# Patient Record
Sex: FEMALE | Race: NATIVE HAWAIIAN OR OTHER PACIFIC ISLANDER | NOT HISPANIC OR LATINO | Employment: FULL TIME | URBAN - METROPOLITAN AREA
[De-identification: names, ages, dates, MRNs, and addresses within clinical notes are randomized per-mention and may not be internally consistent; named-entity substitution may affect disease eponyms.]

---

## 2017-01-16 ENCOUNTER — ALLSCRIPTS OFFICE VISIT (OUTPATIENT)
Dept: OTHER | Facility: OTHER | Age: 47
End: 2017-01-16

## 2017-01-16 DIAGNOSIS — N93.9 ABNORMAL UTERINE AND VAGINAL BLEEDING, UNSPECIFIED: ICD-10-CM

## 2017-01-16 DIAGNOSIS — E03.9 HYPOTHYROIDISM: ICD-10-CM

## 2017-01-16 DIAGNOSIS — I10 ESSENTIAL (PRIMARY) HYPERTENSION: ICD-10-CM

## 2017-01-18 ENCOUNTER — LAB CONVERSION - ENCOUNTER (OUTPATIENT)
Dept: OTHER | Facility: OTHER | Age: 47
End: 2017-01-18

## 2017-01-18 LAB
A/G RATIO (HISTORICAL): 1.5 (CALC) (ref 1–2.5)
ALBUMIN SERPL BCP-MCNC: 4 G/DL (ref 3.6–5.1)
ALP SERPL-CCNC: 30 U/L (ref 33–115)
ALT SERPL W P-5'-P-CCNC: 16 U/L (ref 6–29)
AST SERPL W P-5'-P-CCNC: 17 U/L (ref 10–35)
BASOPHILS # BLD AUTO: 0.7 %
BASOPHILS # BLD AUTO: 40 CELLS/UL (ref 0–200)
BILIRUB SERPL-MCNC: 0.6 MG/DL (ref 0.2–1.2)
BUN SERPL-MCNC: 12 MG/DL (ref 7–25)
BUN/CREA RATIO (HISTORICAL): ABNORMAL (CALC) (ref 6–22)
CALCIUM SERPL-MCNC: 9 MG/DL (ref 8.6–10.2)
CHLORIDE SERPL-SCNC: 103 MMOL/L (ref 98–110)
CO2 SERPL-SCNC: 25 MMOL/L (ref 20–31)
CREAT SERPL-MCNC: 0.71 MG/DL (ref 0.5–1.1)
DEPRECATED RDW RBC AUTO: 13.1 % (ref 11–15)
EGFR AFRICAN AMERICAN (HISTORICAL): 119 ML/MIN/1.73M2
EGFR-AMERICAN CALC (HISTORICAL): 103 ML/MIN/1.73M2
EOSINOPHIL # BLD AUTO: 1.9 %
EOSINOPHIL # BLD AUTO: 108 CELLS/UL (ref 15–500)
GAMMA GLOBULIN (HISTORICAL): 2.7 G/DL (CALC) (ref 1.9–3.7)
GLUCOSE (HISTORICAL): 91 MG/DL (ref 65–99)
HCT VFR BLD AUTO: 39.5 % (ref 35–45)
HGB BLD-MCNC: 13.3 G/DL (ref 11.7–15.5)
LYMPHOCYTES # BLD AUTO: 2269 CELLS/UL (ref 850–3900)
LYMPHOCYTES # BLD AUTO: 39.8 %
MCH RBC QN AUTO: 30.8 PG (ref 27–33)
MCHC RBC AUTO-ENTMCNC: 33.6 G/DL (ref 32–36)
MCV RBC AUTO: 91.8 FL (ref 80–100)
MONOCYTES # BLD AUTO: 325 CELLS/UL (ref 200–950)
MONOCYTES (HISTORICAL): 5.7 %
NEUTROPHILS # BLD AUTO: 2958 CELLS/UL (ref 1500–7800)
NEUTROPHILS # BLD AUTO: 51.9 %
PLATELET # BLD AUTO: 200 THOUSAND/UL (ref 140–400)
PMV BLD AUTO: 9.6 FL (ref 7.5–11.5)
POTASSIUM SERPL-SCNC: 4.5 MMOL/L (ref 3.5–5.3)
RBC # BLD AUTO: 4.3 MILLION/UL (ref 3.8–5.1)
SODIUM SERPL-SCNC: 138 MMOL/L (ref 135–146)
TOTAL PROTEIN (HISTORICAL): 6.7 G/DL (ref 6.1–8.1)
TSH SERPL DL<=0.05 MIU/L-ACNC: 3.58 MIU/L
WBC # BLD AUTO: 5.7 THOUSAND/UL (ref 3.8–10.8)

## 2017-01-31 ENCOUNTER — GENERIC CONVERSION - ENCOUNTER (OUTPATIENT)
Dept: OTHER | Facility: OTHER | Age: 47
End: 2017-01-31

## 2017-06-12 ENCOUNTER — ALLSCRIPTS OFFICE VISIT (OUTPATIENT)
Dept: OTHER | Facility: OTHER | Age: 47
End: 2017-06-12

## 2017-07-05 ENCOUNTER — ALLSCRIPTS OFFICE VISIT (OUTPATIENT)
Dept: OTHER | Facility: OTHER | Age: 47
End: 2017-07-05

## 2017-07-05 DIAGNOSIS — Z01.419 ENCOUNTER FOR GYNECOLOGICAL EXAMINATION WITHOUT ABNORMAL FINDING: ICD-10-CM

## 2017-07-05 DIAGNOSIS — N93.9 ABNORMAL UTERINE AND VAGINAL BLEEDING, UNSPECIFIED: ICD-10-CM

## 2017-07-05 PROCEDURE — G0145 SCR C/V CYTO,THINLAYER,RESCR: HCPCS | Performed by: FAMILY MEDICINE

## 2017-07-06 ENCOUNTER — LAB REQUISITION (OUTPATIENT)
Dept: LAB | Facility: HOSPITAL | Age: 47
End: 2017-07-06
Payer: COMMERCIAL

## 2017-07-06 DIAGNOSIS — Z01.419 ENCOUNTER FOR GYNECOLOGICAL EXAMINATION WITHOUT ABNORMAL FINDING: ICD-10-CM

## 2017-07-06 DIAGNOSIS — N93.9 ABNORMAL UTERINE AND VAGINAL BLEEDING, UNSPECIFIED: ICD-10-CM

## 2017-07-11 ENCOUNTER — HOSPITAL ENCOUNTER (OUTPATIENT)
Dept: ULTRASOUND IMAGING | Facility: CLINIC | Age: 47
Discharge: HOME | End: 2017-07-11
Payer: COMMERCIAL

## 2017-07-11 DIAGNOSIS — N93.9 ABNORMAL UTERINE AND VAGINAL BLEEDING, UNSPECIFIED: ICD-10-CM

## 2017-07-11 PROCEDURE — 76830 TRANSVAGINAL US NON-OB: CPT

## 2017-07-11 PROCEDURE — 76856 US EXAM PELVIC COMPLETE: CPT

## 2017-07-17 LAB
LAB AP GYN PRIMARY INTERPRETATION: NORMAL
LAB AP LMP: NORMAL
Lab: NORMAL

## 2018-01-13 VITALS
DIASTOLIC BLOOD PRESSURE: 80 MMHG | BODY MASS INDEX: 35.52 KG/M2 | HEIGHT: 66 IN | SYSTOLIC BLOOD PRESSURE: 130 MMHG | OXYGEN SATURATION: 99 % | HEART RATE: 88 BPM | WEIGHT: 221 LBS

## 2018-01-14 VITALS
SYSTOLIC BLOOD PRESSURE: 140 MMHG | BODY MASS INDEX: 33.75 KG/M2 | DIASTOLIC BLOOD PRESSURE: 88 MMHG | HEART RATE: 85 BPM | HEIGHT: 66 IN | WEIGHT: 210 LBS | OXYGEN SATURATION: 98 %

## 2018-01-15 NOTE — PROGRESS NOTES
Assessment    1  Encounter for preventive health examination (V70 0) (Z00 00)   2  Abnormal uterine bleeding (626 9) (N93 9)   3  Benign essential hypertension (401 1) (I10)   4  Hypothyroidism (244 9) (E03 9)    Plan  Abnormal uterine bleeding    · (1) CBC/PLT/DIFF; Status:Active; Requested [id number];   Benign essential hypertension, Hypothyroidism    · HydroCHLOROthiazide 25 MG Oral Tablet; take 1 tablet by mouth every day   · Lo Loestrin Fe 1 MG-10 MCG / 10 MCG Oral Tablet; TAKE 1 TABLET BY MOUTH  EVERY DAY AS DIRECTED   · (1) COMPREHENSIVE METABOLIC PANEL; Status:Active; Requested [id number]; Birth control    · Loryna 3-0 02 MG Oral Tablet  Hypothyroidism    · (1) TSH; Status:Active; Requested [id number];     Discussion/Summary  health maintenance visit Currently, she eats an adequate diet  cervical cancer screening is current Breast cancer screening: mammogram is current  Colorectal cancer screening: colorectal cancer screening is not indicated  Screening lab work includes hemoglobin and thyroid function testing  Advice and education were given regarding nutrition, aerobic exercise, weight loss, contraception and cardiovascular risk reduction  Patient discussion: discussed with the patient  Pt is to start HCTZ and change birth control pills  She is to follow up in 2 weeks for nursing appt  She is to have labs to recheck thyroid  The patient has the current Goals: Lose weight and decrease blood pressure  The patent has the current Barriers: Insurance costs  Patient is able to Self-Care  Educational resources provided:   Possible side effects of new medications were reviewed with the patient/guardian today  The treatment plan was reviewed with the patient/guardian   The patient/guardian understands and agrees with the treatment plan   The patient was counseled regarding instructions for management, risk factor reductions, patient and family education, impressions, risks and benefits of treatment options, importance of compliance with treatment  Self Referrals: No      Chief Complaint  Pt  in office today for a check up  She would like to discuss her blood pressure and her menstrual cycle  History of Present Illness  HM, Adult Female: The patient is being seen for a health maintenance evaluation  The last health maintenance visit was 1 year(s) ago  Social History: Household members include spouse and 1 son(s)  She is   Work status: working full time  The patient has never smoked cigarettes  She reports occasional alcohol use  She has never used illicit drugs  General Health: The patient's health since the last visit is described as good  She has regular dental visits  She complains of vision problems  Vision care includes wearing glasses and having regular eye examinations  She denies hearing loss  Lifestyle:  She consumes a diverse and healthy diet  She has weight concerns  She exercises regularly  She does not use tobacco  She denies alcohol use  She denies drug use  Reproductive health: the patient is perimenopausal   she reports abnormal menses  she uses contraception  For contraception, she uses oral contraception pills  she is sexually active  pregnancy history: G 1P 1, 0, 0, 1  Screening:   HPI: Ptis here for check up  She has had some abnormal periods  She is on birth control which should regulate menses  Dysfunctional Uterine Bleeding (Brief): The patient is being seen for an initial evaluation of an existing diagnosis of dysfunctional uterine bleeding  Symptoms:  irregular vaginal bleeding, prolonged menstrual bleeding, hypomenorrhea and breakthrough bleeding, but no postcoital bleeding and no polymenorrhea  The patient is currently experiencing symptoms  Associated symptoms:  bloating, but no weight loss, no weight gain, no fatigue, no fever, no pelvic pain, no easy bruising, no nipple discharge, no lightheadedness and no shortness of breath     Hypothyroidism (Follow-Up): The patient is being seen for follow-up of hypothyroidism of undetermined etiology  The patient reports doing well  The patient is currently asymptomatic  Hypertension (Follow-Up): The patient presents for follow-up of essential hypertension  The patient states she has been stable with her blood pressure control since the last visit  She has no comorbid illnesses  She has no significant interval events  Symptoms: The patient is currently asymptomatic  Review of Systems    Constitutional: No fever, no chills, feels well, no tiredness, no recent weight gain or weight loss  Eyes: No complaints of eye pain, no red eyes, no eyesight problems, no discharge, no dry eyes, no itching of eyes  ENT: no complaints of earache, no loss of hearing, no nose bleeds, no nasal discharge, no sore throat, no hoarseness  Cardiovascular: No complaints of slow heart rate, no fast heart rate, no chest pain, no palpitations, no leg claudication, no lower extremity edema  Respiratory: No complaints of shortness of breath, no wheezing, no cough, no SOB on exertion, no orthopnea, no PND  Gastrointestinal: No complaints of abdominal pain, no constipation, no nausea or vomiting, no diarrhea, no bloody stools  Genitourinary: as noted in HPI  Musculoskeletal: No complaints of arthralgias, no myalgias, no joint swelling or stiffness, no limb pain or swelling  Integumentary: No complaints of skin rash or lesions, no itching, no skin wounds, no breast pain or lump  Neurological: No complaints of headache, no confusion, no convulsions, no numbness, no dizziness or fainting, no tingling, no limb weakness, no difficulty walking  Psychiatric: Not suicidal, no sleep disturbance, no anxiety or depression, no change in personality, no emotional problems  Endocrine: No complaints of proptosis, no hot flashes, no muscle weakness, no deepening of the voice, no feelings of weakness     Hematologic/Lymphatic: No complaints of swollen glands, no swollen glands in the neck, does not bleed easily, does not bruise easily  ROS reviewed  Active Problems    1  Abnormal mammogram (793 80) (R92 8)   2  Abnormal uterine bleeding (626 9) (N93 9)   3  Benign essential hypertension (401 1) (I10)   4  Birth control (V25 9) (Z30 9)   5  Encounter for gynecological examination with Papanicolaou smear of cervix   (V72 31,V76 2) (Z01 419,Z12 4)   6  Hypertriglyceridemia without hypercholesterolemia (272 1) (E78 1)   7  Hypothyroidism (244 9) (E03 9)   8  Need for diphtheria-tetanus-pertussis (Tdap) vaccine (V06 1) (Z23)   9  Need for influenza vaccination (V04 81) (Z23)    Surgical History    · History of  Section Low Transverse   · History of Cholecystectomy Laparoscopic   · History of Oral Surgery Tooth Extraction    Family History  Mother    · Denied: Family history of mental disorder   · Denied: Family history of substance abuse  Father    · Denied: Family history of mental disorder   · Denied: Family history of substance abuse    Social History    · Always uses seat belt   · Employed   · Exercises regularly   ·    · Never a smoker   · Primary spoken language English   · Seeing a dentist    Current Meds   1  [facility] Natural Fish Oil 1000 MG Oral Capsule; 2 po once daily; Therapy: 2016 to (Last Rx:2016) Ordered   2  Levothyroxine Sodium 137 MCG Oral Tablet; Take 1 tablet daily; Therapy: [id number] to (Evaluate:2017)  Requested for: 73Fia2916; Last   Rx:[id number] Ordered   3  Loryna 3-0 02 MG Oral Tablet; Take 1 tablet daily; Therapy: 40VRJ5886 to (Evaluate:40Uuj0529)  Requested for: 22SEL7180; Last   Rx:2016 Ordered    Allergies    1   No Known Drug Allergies    Vitals   Recorded: 65AGJ9049 02:12PM   Heart Rate 81   Systolic 440   Diastolic 90   Height 5 ft 6 in   Weight 220 lb    BMI Calculated 35 51   BSA Calculated 2 09   O2 Saturation 98     Physical Exam    Constitutional   General appearance: No acute distress, well appearing and well nourished  Eyes   Conjunctiva and lids: No swelling, erythema or discharge  Pupils and irises: Equal, round and reactive to light  Ears, Nose, Mouth, and Throat   External inspection of ears and nose: Normal     Otoscopic examination: Tympanic membranes translucent with normal light reflex  Canals patent without erythema  Oropharynx: Normal with no erythema, edema, exudate or lesions  Pulmonary   Respiratory effort: No increased work of breathing or signs of respiratory distress  Auscultation of lungs: Clear to auscultation  Cardiovascular   Palpation of heart: Normal PMI, no thrills  Auscultation of heart: Normal rate and rhythm, normal S1 and S2, without murmurs  Examination of extremities for edema and/or varicosities: Normal     Abdomen   Abdomen: Non-tender, no masses  Liver and spleen: No hepatomegaly or splenomegaly  Lymphatic   Palpation of lymph nodes in neck: No lymphadenopathy  Musculoskeletal   Gait and station: Normal     Digits and nails: Normal without clubbing or cyanosis  Inspection/palpation of joints, bones, and muscles: Normal     Skin   Skin and subcutaneous tissue: Normal without rashes or lesions  Neurologic   Cranial nerves: Cranial nerves 2-12 intact  Reflexes: 2+ and symmetric  Sensation: No sensory loss      Psychiatric   Orientation to person, place, and time: Normal     Mood and affect: Normal        Signatures   Electronically signed by : SAVANNAH Patel; Jan 16 2017  2:54PM EST                       (Author)    Electronically signed by : KEELY Morfin ; Jan 16 2017  3:26PM EST

## 2018-01-22 VITALS
SYSTOLIC BLOOD PRESSURE: 152 MMHG | HEART RATE: 81 BPM | HEIGHT: 66 IN | DIASTOLIC BLOOD PRESSURE: 90 MMHG | OXYGEN SATURATION: 98 % | WEIGHT: 220 LBS | BODY MASS INDEX: 35.36 KG/M2

## 2018-01-22 VITALS — DIASTOLIC BLOOD PRESSURE: 90 MMHG | TEMPERATURE: 98.8 F | HEART RATE: 90 BPM | SYSTOLIC BLOOD PRESSURE: 140 MMHG

## 2018-02-15 ENCOUNTER — TELEPHONE (OUTPATIENT)
Dept: FAMILY MEDICINE CLINIC | Facility: CLINIC | Age: 48
End: 2018-02-15

## 2018-02-15 DIAGNOSIS — E03.9 HYPOTHYROIDISM, UNSPECIFIED TYPE: Primary | ICD-10-CM

## 2018-02-15 RX ORDER — LEVOTHYROXINE SODIUM 137 UG/1
137 TABLET ORAL DAILY
Qty: 90 TABLET | Refills: 1 | Status: SHIPPED | OUTPATIENT
Start: 2018-02-15 | End: 2018-04-27 | Stop reason: SDUPTHER

## 2018-02-15 RX ORDER — LEVOTHYROXINE SODIUM 137 UG/1
1 TABLET ORAL DAILY
COMMUNITY
Start: 2015-05-29 | End: 2018-02-15 | Stop reason: SDUPTHER

## 2018-03-17 ENCOUNTER — HOSPITAL ENCOUNTER (EMERGENCY)
Facility: HOSPITAL | Age: 48
Discharge: HOME | End: 2018-03-17
Attending: EMERGENCY MEDICINE
Payer: COMMERCIAL

## 2018-03-17 ENCOUNTER — APPOINTMENT (EMERGENCY)
Dept: CT IMAGING | Facility: HOSPITAL | Age: 48
End: 2018-03-17
Payer: COMMERCIAL

## 2018-03-17 VITALS
HEART RATE: 93 BPM | DIASTOLIC BLOOD PRESSURE: 103 MMHG | WEIGHT: 208 LBS | SYSTOLIC BLOOD PRESSURE: 172 MMHG | TEMPERATURE: 98.5 F | HEIGHT: 66 IN | OXYGEN SATURATION: 98 % | RESPIRATION RATE: 18 BRPM | BODY MASS INDEX: 33.43 KG/M2

## 2018-03-17 DIAGNOSIS — S09.90XA MINOR HEAD INJURY: Primary | ICD-10-CM

## 2018-03-17 PROCEDURE — 90715 TDAP VACCINE 7 YRS/> IM: CPT | Performed by: EMERGENCY MEDICINE

## 2018-03-17 PROCEDURE — 70450 CT HEAD/BRAIN W/O DYE: CPT

## 2018-03-17 PROCEDURE — 72125 CT NECK SPINE W/O DYE: CPT

## 2018-03-17 PROCEDURE — 90471 IMMUNIZATION ADMIN: CPT

## 2018-03-17 PROCEDURE — 99284 EMERGENCY DEPT VISIT MOD MDM: CPT

## 2018-03-17 PROCEDURE — 70486 CT MAXILLOFACIAL W/O DYE: CPT

## 2018-03-17 RX ORDER — ACETAMINOPHEN 325 MG/1
975 TABLET ORAL ONCE
Status: COMPLETED | OUTPATIENT
Start: 2018-03-17 | End: 2018-03-17

## 2018-03-17 RX ORDER — BACITRACIN, NEOMYCIN, POLYMYXIN B 400; 3.5; 5 [USP'U]/G; MG/G; [USP'U]/G
1 OINTMENT TOPICAL ONCE
Status: COMPLETED | OUTPATIENT
Start: 2018-03-17 | End: 2018-03-17

## 2018-03-17 RX ORDER — NAPROXEN 250 MG/1
500 TABLET ORAL ONCE
Status: COMPLETED | OUTPATIENT
Start: 2018-03-17 | End: 2018-03-17

## 2018-03-17 RX ORDER — NAPROXEN 500 MG/1
500 TABLET ORAL 2 TIMES DAILY WITH MEALS
Qty: 20 TABLET | Refills: 0 | Status: SHIPPED | OUTPATIENT
Start: 2018-03-17 | End: 2018-03-21

## 2018-03-17 RX ADMIN — BACITRACIN, NEOMYCIN, POLYMYXIN B 1 SMALL APPLICATION: 400; 3.5; 5 OINTMENT TOPICAL at 16:37

## 2018-03-17 RX ADMIN — NAPROXEN 500 MG: 250 TABLET ORAL at 18:30

## 2018-03-17 RX ADMIN — ACETAMINOPHEN 975 MG: 325 TABLET, FILM COATED ORAL at 16:36

## 2018-03-17 RX ADMIN — TETANUS TOXOID, REDUCED DIPHTHERIA TOXOID AND ACELLULAR PERTUSSIS VACCINE, ADSORBED 0.5 ML: 5; 2.5; 8; 8; 2.5 SUSPENSION INTRAMUSCULAR at 17:48

## 2018-03-17 NOTE — DISCHARGE INSTRUCTIONS

## 2018-03-21 ENCOUNTER — OFFICE VISIT (OUTPATIENT)
Dept: FAMILY MEDICINE CLINIC | Facility: CLINIC | Age: 48
End: 2018-03-21
Payer: COMMERCIAL

## 2018-03-21 VITALS
WEIGHT: 214.2 LBS | HEIGHT: 66 IN | HEART RATE: 90 BPM | DIASTOLIC BLOOD PRESSURE: 76 MMHG | SYSTOLIC BLOOD PRESSURE: 140 MMHG | BODY MASS INDEX: 34.42 KG/M2 | OXYGEN SATURATION: 98 %

## 2018-03-21 DIAGNOSIS — G44.319 ACUTE POST-TRAUMATIC HEADACHE, NOT INTRACTABLE: Primary | ICD-10-CM

## 2018-03-21 DIAGNOSIS — S06.0X0A CONCUSSION WITHOUT LOSS OF CONSCIOUSNESS, INITIAL ENCOUNTER: ICD-10-CM

## 2018-03-21 DIAGNOSIS — J00 OTHER ACUTE RHINITIS: ICD-10-CM

## 2018-03-21 PROCEDURE — 99214 OFFICE O/P EST MOD 30 MIN: CPT | Performed by: FAMILY MEDICINE

## 2018-03-21 RX ORDER — TRAMADOL HYDROCHLORIDE 50 MG/1
50 TABLET ORAL EVERY 6 HOURS PRN
Qty: 30 TABLET | Refills: 0 | Status: ON HOLD | OUTPATIENT
Start: 2018-03-21 | End: 2019-04-22

## 2018-03-21 RX ORDER — AZELASTINE 1 MG/ML
1 SPRAY, METERED NASAL 2 TIMES DAILY
Qty: 30 ML | Refills: 0 | Status: SHIPPED | OUTPATIENT
Start: 2018-03-21 | End: 2018-04-17 | Stop reason: SDUPTHER
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
## 2018-03-21 NOTE — PATIENT INSTRUCTIONS
Concussion   AMBULATORY CARE:   A concussion  is a mild brain injury  It is usually caused by a bump or blow to the head from a fall, a motor vehicle crash, or a sports injury  Sometimes being forcefully shaken may cause a concussion  Common symptoms include the following:  Symptoms may occur right away, or they may appear days after the concussion  After the injury, you may have any of these symptoms:  · A mild to moderate headache    · Dizziness, loss of balance, or blurry vision    · Nausea or vomiting     · A change in mood, such as restlessness or irritability    · Trouble thinking, remembering things, or concentrating    · Ringing in the ears    · Drowsiness or decreased energy    · Changes in your normal sleeping pattern  Have someone else call 911 for the following:   · Someone tries to wake you and cannot do so  · You have a seizure, increasing confusion, or a change in personality  · Your speech becomes slurred, or you have new vision problems  Seek care immediately if:   · You have a severe headache that does not go away  · Someone tries to wake you and cannot do so  · You have a seizure, increasing confusion, or a change in personality  · Your speech becomes slurred, or you have new vision problems  · You have arm or leg weakness, numbness, or new problems with coordination  · You have blood or clear fluid coming out of the ears or nose  Contact your healthcare provider if:   · You have nausea or are vomiting  · You feel more sleepy than usual     · Your symptoms get worse  · Your symptoms last longer than 6 weeks after the injury  · You have questions or concerns about your condition or care  Manage a concussion:  Usually no treatment is needed for a mild concussion  Concussion symptoms usually go away within about 10 days  The following may be recommended to manage your symptoms:  · Rest  from physical and mental activities as directed   Mental activities are those that require thinking, concentration, and attention  You will need to rest until your symptoms are gone  Rest will allow you to recover from your concussion  Ask your healthcare provider when you can return to work and other daily activities  · Have someone stay with you for the first 24 hours after your injury  Your healthcare provider should be contacted if your symptoms get worse, or you develop new symptoms  · Do not participate in sports and physical activities until your healthcare provider says it is okay  They could make your symptoms worse or lead to another concussion  Your healthcare provider will tell you when it is okay for you to return to sports or physical activities  · Acetaminophen  helps to decrease pain  It is available without a doctor's order  Ask how much to take and how often to take it  Follow directions  Acetaminophen can cause liver damage if not taken correctly  · NSAIDs , such as ibuprofen, help decrease swelling and pain  NSAIDs can cause stomach bleeding or kidney problems in certain people  If you take blood thinner medicine, always ask your healthcare provider if NSAIDs are safe for you  Always read the medicine label and follow directions  Prevent another concussion:   · Wear protective sports equipment that fit properly  Helmets help decrease your risk of a serious brain injury  Talk to your healthcare provider about ways you can decrease your risk for a concussion if you play sports  · Wear your seat belt  every time you travel  This helps to decrease your risk of a head injury if you are in a car accident  Follow up with your healthcare provider as directed:  Write down your questions so you remember to ask them during your visits  © 2017 [employer] Information is for End User's use only and may not be sold, redistributed or otherwise used for commercial purposes   All illustrations and images included in CareNotes® are the copyrighted property of [employer]  or [employer] Ramírez  The above information is an  only  It is not intended as medical advice for individual conditions or treatments  Talk to your doctor, nurse or pharmacist before following any medical regimen to see if it is safe and effective for you
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
## 2018-04-17 DIAGNOSIS — J30.1 SEASONAL ALLERGIC RHINITIS DUE TO POLLEN: Primary | ICD-10-CM

## 2018-04-17 RX ORDER — AZELASTINE 1 MG/ML
SPRAY, METERED NASAL
Qty: 30 ML | Refills: 3 | Status: SHIPPED | OUTPATIENT
Start: 2018-04-17 | End: 2019-02-05 | Stop reason: SDUPTHER

## 2018-04-27 DIAGNOSIS — E03.9 HYPOTHYROIDISM, UNSPECIFIED TYPE: ICD-10-CM

## 2018-04-27 RX ORDER — LEVOTHYROXINE SODIUM 137 UG/1
TABLET ORAL
Qty: 90 TABLET | Refills: 1 | Status: SHIPPED | OUTPATIENT
Start: 2018-04-27 | End: 2018-08-01 | Stop reason: SDUPTHER

## 2018-06-06 ENCOUNTER — OFFICE VISIT (OUTPATIENT)
Dept: OBGYN CLINIC | Facility: MEDICAL CENTER | Age: 48
End: 2018-06-06
Payer: COMMERCIAL

## 2018-06-06 VITALS
DIASTOLIC BLOOD PRESSURE: 74 MMHG | WEIGHT: 211 LBS | BODY MASS INDEX: 33.91 KG/M2 | HEIGHT: 66 IN | RESPIRATION RATE: 14 BRPM | SYSTOLIC BLOOD PRESSURE: 126 MMHG

## 2018-06-06 DIAGNOSIS — N92.0 MENORRHAGIA WITH REGULAR CYCLE: Primary | ICD-10-CM

## 2018-06-06 PROCEDURE — 88305 TISSUE EXAM BY PATHOLOGIST: CPT | Performed by: PATHOLOGY

## 2018-06-06 PROCEDURE — 99214 OFFICE O/P EST MOD 30 MIN: CPT | Performed by: OBSTETRICS & GYNECOLOGY

## 2018-06-06 RX ORDER — TRANEXAMIC ACID 650 1/1
2 TABLET ORAL 3 TIMES DAILY
Qty: 30 TABLET | Refills: 3 | Status: SHIPPED | OUTPATIENT
Start: 2018-06-06 | End: 2018-06-11

## 2018-06-06 NOTE — PROGRESS NOTES
Assessment/Plan:      Diagnoses and all orders for this visit:    Menorrhagia with regular cycle  -     Tissue Exam        Placement of Mirena IUD in the near future,  Lysteda for heavy breakthrough bleeding  Stop oral contraceptives after Mirena in place  Do not take Lysteda with a combination oral contraceptive  Subjective:     Patient ID: Ivy Levi is a 56 y o  female  Patient is a 50-year-old female, para 1, with a large fibroid uterus, missing work because of heavy menses  Last year the patient had a normal Pap smear  For the last 3 years patient's menses is been getting heavier and heavier  Patient denies dysmenorrhea or pelvic pain  Patient's mother went through menopause in her early 46s  Past surgical history is significant for a  and a cholecystectomy  Current medications are Junel, iron,  levothyroxine and hydrochlorothiazide  No known drug allergies  Patient denies a family history of breast ovarian or colon cancer  Review of Systems   Genitourinary: Negative for pelvic pain  Objective:     Physical Exam   Constitutional: She is oriented to person, place, and time  She appears well-developed and well-nourished  HENT:   Head: Normocephalic  Eyes: Pupils are equal, round, and reactive to light  Neck: No thyromegaly present  Cardiovascular: Normal rate and regular rhythm  Pulmonary/Chest: Breath sounds normal  She is in respiratory distress  She has no wheezes  She has no rales  Abdominal: Soft  She exhibits mass  She exhibits no distension  There is no tenderness  There is no rebound and no guarding  Fibroid uterus palpable to umbilicus  Genitourinary: Vagina normal and uterus normal    Neurological: She is alert and oriented to person, place, and time  Skin: Skin is warm  Psychiatric: She has a normal mood and affect   Her behavior is normal

## 2018-06-06 NOTE — PROGRESS NOTES
Procedures Endometrial Biopsy Procedure Note    Pre-operative Diagnosis:  Menorrhagia    Post-operative Diagnosis: same    Indications: abnormal uterine bleeding    Procedure Details    Urine pregnancy test was done today and result was negative  The risks (including infection, bleeding, pain, and uterine perforation) and benefits of the procedure were explained to the patient and Written informed consent was obtained  The patient was placed in the dorsal lithotomy position  Bimanual exam showed the uterus to be in the anteroflexed position  A speculum inserted in the vagina, and the cervix prepped with povidone iodine  A Pipelle endometrial aspirator was used to sample the endometrium  A(n) moderate amount of tissue was obtained  Sample was sent for pathologic examination  Condition:  Stable    Complications:  None    Plan:    The patient was advised to call for any fever or for prolonged or severe pain or bleeding  She was advised to use NSAID as needed for mild to moderate pain  She was advised to avoid vaginal intercourse for 48 hours or until the bleeding has completely stopped  Attending Physician Documentation:  I was present for or participated in the entire procedure, including opening and closing

## 2018-06-19 ENCOUNTER — TELEPHONE (OUTPATIENT)
Dept: OBGYN CLINIC | Facility: MEDICAL CENTER | Age: 48
End: 2018-06-19

## 2018-06-19 NOTE — TELEPHONE ENCOUNTER
----- Message from Javad Burgess MD sent at 6/12/2018  3:31 PM EDT -----  Please notify patient her EBX was benign

## 2018-07-02 ENCOUNTER — TELEPHONE (OUTPATIENT)
Dept: OBGYN CLINIC | Facility: CLINIC | Age: 48
End: 2018-07-02

## 2018-07-02 NOTE — TELEPHONE ENCOUNTER
Spoke with pt  She would like Mirena inserted asap, states she has had period 3x this month and would like to get the mirena in place  She had her period last week, states it has been irregular and is unsure when she will have again  She reports being abstinent since December of 2017 and denies possibility of pregnancy  When would you advise pt be scheduled? Thank you!

## 2018-07-02 NOTE — TELEPHONE ENCOUNTER
[facility] from [facility]  Will place in [facility] room pending insertion  Can call pt to schedule apt  Thank you!

## 2018-07-03 ENCOUNTER — TELEPHONE (OUTPATIENT)
Dept: OBGYN CLINIC | Facility: CLINIC | Age: 48
End: 2018-07-03

## 2018-07-06 ENCOUNTER — OFFICE VISIT (OUTPATIENT)
Dept: OBGYN CLINIC | Facility: CLINIC | Age: 48
End: 2018-07-06
Payer: COMMERCIAL

## 2018-07-06 VITALS
SYSTOLIC BLOOD PRESSURE: 124 MMHG | WEIGHT: 210 LBS | HEIGHT: 67 IN | DIASTOLIC BLOOD PRESSURE: 70 MMHG | BODY MASS INDEX: 32.96 KG/M2 | RESPIRATION RATE: 14 BRPM

## 2018-07-06 DIAGNOSIS — Z30.430 ENCOUNTER FOR IUD INSERTION: Primary | ICD-10-CM

## 2018-07-06 PROCEDURE — 58300 INSERT INTRAUTERINE DEVICE: CPT | Performed by: OBSTETRICS & GYNECOLOGY

## 2018-07-06 PROCEDURE — 99213 OFFICE O/P EST LOW 20 MIN: CPT | Performed by: OBSTETRICS & GYNECOLOGY

## 2018-07-06 NOTE — PROGRESS NOTES
Procedures    IUD Insertion Procedure Note    Pre-operative Diagnosis: Menorrhagia    Post-operative Diagnosis: same    Indications: contraception    Procedure Details   Urine pregnancy test was done today and result was negative  Her LMP was 6/23/18   The risks (including infection, bleeding, pain, ectopic pregnancy and uterine perforation) and benefits of the procedure were explained to the patient and Written informed consent was obtained  Cervix cleansed with Betadine  Uterus sounded to 10 cm  IUD inserted without difficulty  Strings trimmed to 6 cm  Patient tolerated procedure well  IUD Information:  Mirena  Condition:  Stable    Complications:  None    Plan:    The patient was advised to call for any fever or for prolonged or severe pain or bleeding  She was advised to use NSAID as needed for mild to moderate pain  Attending Physician Documentation:  I was present for or participated in the entire procedure, including opening and closing

## 2018-07-11 ENCOUNTER — OFFICE VISIT (OUTPATIENT)
Dept: OBGYN CLINIC | Facility: MEDICAL CENTER | Age: 48
End: 2018-07-11

## 2018-07-11 VITALS
WEIGHT: 204.7 LBS | HEIGHT: 67 IN | DIASTOLIC BLOOD PRESSURE: 70 MMHG | SYSTOLIC BLOOD PRESSURE: 110 MMHG | BODY MASS INDEX: 32.13 KG/M2

## 2018-07-11 DIAGNOSIS — Z30.431 IUD CHECK UP: Primary | ICD-10-CM

## 2018-07-11 PROCEDURE — 99024 POSTOP FOLLOW-UP VISIT: CPT | Performed by: OBSTETRICS & GYNECOLOGY

## 2018-07-11 NOTE — PROGRESS NOTES
Assessment/Plan:      There are no diagnoses linked to this encounter  Subjective:     Patient ID: Bobbi Holder is a 56 y o  female  Patient is here for an IUD check  Review of systems is negative for fevers, heavy vaginal bleeding, pelvic or abdominal pain  Review of Systems   Constitutional: Negative for fever  Genitourinary: Negative for pelvic pain  Objective:     Physical Exam   Abdominal: Soft  She exhibits no distension  There is no tenderness  There is no rebound and no guarding     Genitourinary: Vagina normal and uterus normal    Genitourinary Comments: IUD strings found at 6m and shortened to 3cm

## 2018-08-01 ENCOUNTER — OFFICE VISIT (OUTPATIENT)
Dept: FAMILY MEDICINE CLINIC | Facility: CLINIC | Age: 48
End: 2018-08-01
Payer: COMMERCIAL

## 2018-08-01 VITALS
BODY MASS INDEX: 33.09 KG/M2 | HEIGHT: 67 IN | HEART RATE: 90 BPM | DIASTOLIC BLOOD PRESSURE: 88 MMHG | TEMPERATURE: 99.1 F | SYSTOLIC BLOOD PRESSURE: 134 MMHG | WEIGHT: 210.8 LBS | OXYGEN SATURATION: 97 %

## 2018-08-01 DIAGNOSIS — Z13.6 SCREENING FOR CARDIOVASCULAR CONDITION: ICD-10-CM

## 2018-08-01 DIAGNOSIS — E03.9 HYPOTHYROIDISM, ADULT: Primary | ICD-10-CM

## 2018-08-01 DIAGNOSIS — Z13.1 SCREENING FOR DIABETES MELLITUS (DM): ICD-10-CM

## 2018-08-01 DIAGNOSIS — E03.9 HYPOTHYROIDISM, UNSPECIFIED TYPE: ICD-10-CM

## 2018-08-01 PROCEDURE — 99396 PREV VISIT EST AGE 40-64: CPT | Performed by: PHYSICIAN ASSISTANT

## 2018-08-01 RX ORDER — LEVOTHYROXINE SODIUM 137 UG/1
137 TABLET ORAL DAILY
Qty: 90 TABLET | Refills: 1 | Status: SHIPPED | OUTPATIENT
Start: 2018-08-01 | End: 2018-10-25 | Stop reason: ALTCHOICE

## 2018-08-01 NOTE — PROGRESS NOTES
Assessment/Plan     Healthy female exam       1  Labs ordered  2  Patient Counseling:  --Nutrition: Stressed importance of moderation in sodium/caffeine intake, saturated fat and cholesterol, caloric balance, sufficient intake of fresh fruits, vegetables, fiber, calcium, iron, and 1 mg of folate supplement per day (for females capable of pregnancy)  --Discussed the issue of estrogen replacement, calcium supplement, and the daily use of baby aspirin  --Exercise: Stressed the importance of regular exercise  --Injury prevention: Discussed safety belts, safety helmets, smoke detector, smoking near bedding or upholstery  --Dental health: Discussed importance of regular tooth brushing, flossing, and dental visits  --Immunizations reviewed  --After hours service discussed with patient    3  Discussed the patient's BMI with her  The BMI is above average; BMI management plan is completed  4  Follow up in one year  Lani Sherwood is a 56 y o  female and is here for a comprehensive physical exam  The patient reports no problems  Do you take any herbs or supplements that were not prescribed by a doctor? yes  Are you taking calcium supplements? no  Are you taking aspirin daily? no     History:  Up to date    The following portions of the patient's history were reviewed and updated as appropriate:   She  has a past medical history of Disease of thyroid gland  She   Patient Active Problem List    Diagnosis Date Noted    Hypertriglyceridemia without hypercholesterolemia 2016    Benign essential hypertension 2016    Hypothyroidism 2014    Abnormal mammogram 2014     She  has a past surgical history that includes  section, low transverse; Cholecystectomy; and Tooth extraction  Her family history is not on file  She  reports that she has never smoked  She has never used smokeless tobacco  She reports that she does not drink alcohol or use drugs    Current Outpatient Prescriptions   Medication Sig Dispense Refill    hydrochlorothiazide (HYDRODIURIL) 25 mg tablet Take 1 tablet by mouth daily      levothyroxine 137 mcg tablet Take 1 tablet (137 mcg total) by mouth daily 90 tablet 1    MIRENA, 52 MG, 20 MCG/24HR IUD       Omega-3 Fatty Acids ([employer] NATURAL FISH OIL) 1000 MG CAPS Take by mouth      azelastine (ASTELIN) 0 1 % nasal spray INSTILL 1 SPRAY IN EACH NOSTRIL TWICE A DAY 30 mL 3    traMADol (ULTRAM) 50 mg tablet Take 1 tablet (50 mg total) by mouth every 6 (six) hours as needed for moderate pain 30 tablet 0     No current facility-administered medications for this visit  Current Outpatient Prescriptions on File Prior to Visit   Medication Sig    hydrochlorothiazide (HYDRODIURIL) 25 mg tablet Take 1 tablet by mouth daily    MIRENA, 52 MG, 20 MCG/24HR IUD     Omega-3 Fatty Acids ([employer] NATURAL FISH OIL) 1000 MG CAPS Take by mouth    [DISCONTINUED] levothyroxine 137 mcg tablet TAKE 1 TABLET BY MOUTH EVERY DAY    azelastine (ASTELIN) 0 1 % nasal spray INSTILL 1 SPRAY IN EACH NOSTRIL TWICE A DAY    traMADol (ULTRAM) 50 mg tablet Take 1 tablet (50 mg total) by mouth every 6 (six) hours as needed for moderate pain    [DISCONTINUED] norethindrone-ethinyl estradiol (LOESTRIN FE 1/20) 1-20 MG-MCG per tablet Take 1 tablet by mouth daily     No current facility-administered medications on file prior to visit  She has No Known Allergies       Review of Systems  Do you have pain that bothers you in your daily life? no  Pertinent items are noted in HPI       Objective     /88   Pulse 90   Temp 99 1 °F (37 3 °C)   Ht 5' 6 5" (1 689 m)   Wt 95 6 kg (210 lb 12 8 oz)   SpO2 97%   BMI 33 51 kg/m²   General appearance: alert and oriented, in no acute distress  Head: Normocephalic, without obvious abnormality, atraumatic  Eyes: negative  Ears: normal TM's and external ear canals both ears  Nose: Nares normal  Septum midline   Mucosa normal  No drainage or sinus tenderness  Throat: lips, mucosa, and tongue normal; teeth and gums normal  Neck: no adenopathy, no carotid bruit, supple, symmetrical, trachea midline and thyroid not enlarged, symmetric, no tenderness/mass/nodules  Back: symmetric, no curvature  ROM normal  No CVA tenderness    Lungs: clear to auscultation bilaterally  Heart: regular rate and rhythm, S1, S2 normal, no murmur, click, rub or gallop  Abdomen: soft, non-tender; bowel sounds normal; no masses,  no organomegaly  Extremities: extremities normal, warm and well-perfused; no cyanosis, clubbing, or edema  Pulses: 2+ and symmetric  Skin: Skin color, texture, turgor normal  No rashes or lesions  Lymph nodes: Cervical, supraclavicular, and axillary nodes normal   Neurologic: Grossly normal

## 2018-08-01 NOTE — PATIENT INSTRUCTIONS

## 2018-08-20 ENCOUNTER — TELEPHONE (OUTPATIENT)
Dept: OBGYN CLINIC | Facility: CLINIC | Age: 48
End: 2018-08-20

## 2018-08-20 DIAGNOSIS — I10 HYPERTENSION, ESSENTIAL: Primary | ICD-10-CM

## 2018-08-20 RX ORDER — HYDROCHLOROTHIAZIDE 25 MG/1
TABLET ORAL
Qty: 90 TABLET | Refills: 1 | Status: SHIPPED | OUTPATIENT
Start: 2018-08-20 | End: 2019-02-27 | Stop reason: SDUPTHER

## 2018-08-20 NOTE — TELEPHONE ENCOUNTER
----- Message from Will Avalos sent at 8/18/2018  5:11 PM EDT -----  Regarding: Non-Urgent Medical Question  Contact: [phone]  Hi Dr Andi Moritz    I got my second period since the [address]  Yesterday I started with a slight period, today heavy, I took my doses of lysteda, 2 so far today  My period was very heavy, I was using tampons, relaxing, my mirena came out on the side of a soaked tampon  My 6th soaked tampon today  I am so upset  Please advise what we can do      [place]

## 2018-08-20 NOTE — TELEPHONE ENCOUNTER
Pt will also like to know what else she can do so that this may not happen again  She suggested the nexplanon and will like your thoughts on that  As per the pt she doesn't want to  have to continue to schedule these office visits, and if she continues to go with the mirena the same thing happens

## 2018-08-21 NOTE — TELEPHONE ENCOUNTER
Left message with patient to continue Lysteda as directed, hydrate, take Iron  Please call her an make a F/U appointment in the office with me  If she will let me place another Mirena please have one on hand for me to place

## 2018-08-30 ENCOUNTER — OFFICE VISIT (OUTPATIENT)
Dept: OBGYN CLINIC | Facility: MEDICAL CENTER | Age: 48
End: 2018-08-30
Payer: COMMERCIAL

## 2018-08-30 VITALS
BODY MASS INDEX: 33.97 KG/M2 | WEIGHT: 211.4 LBS | SYSTOLIC BLOOD PRESSURE: 126 MMHG | RESPIRATION RATE: 14 BRPM | DIASTOLIC BLOOD PRESSURE: 80 MMHG | HEIGHT: 66 IN

## 2018-08-30 DIAGNOSIS — N92.0 MENORRHAGIA WITH REGULAR CYCLE: Primary | ICD-10-CM

## 2018-08-30 DIAGNOSIS — T83.32XA DISPLACEMENT OF INTRAUTERINE CONTRACEPTIVE DEVICE, INITIAL ENCOUNTER: ICD-10-CM

## 2018-08-30 PROCEDURE — 99213 OFFICE O/P EST LOW 20 MIN: CPT | Performed by: OBSTETRICS & GYNECOLOGY

## 2018-08-30 RX ORDER — MEDROXYPROGESTERONE ACETATE 150 MG/ML
150 INJECTION, SUSPENSION INTRAMUSCULAR ONCE
Status: CANCELLED | OUTPATIENT
Start: 2018-08-30 | End: 2018-08-30

## 2018-08-30 RX ORDER — MEDROXYPROGESTERONE ACETATE 150 MG/ML
150 INJECTION, SUSPENSION INTRAMUSCULAR
Qty: 1 ML | Refills: 0 | Status: SHIPPED | OUTPATIENT
Start: 2018-08-30 | End: 2018-11-25 | Stop reason: SDUPTHER

## 2018-08-30 NOTE — PROGRESS NOTES
Pt returned to office for depo provera injection  Injection given IM in left deltoid; Pt tolerated injection well  Instructed patient to use back up [facility] for 1 month  Also as this is the first time patient has had medication; Advised patient to sit in waiting room for 15 minutes  Pt verbalized understanding and agreeable to plan  Pt is to return to office for IUD placement once device is received     Lot#:  [id number]  Exp:  01/2020  oSnali Mcconnell 48:  [id number]
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
## 2018-09-05 ENCOUNTER — TELEPHONE (OUTPATIENT)
Dept: OBGYN CLINIC | Facility: CLINIC | Age: 48
End: 2018-09-05

## 2018-09-07 ENCOUNTER — TELEPHONE (OUTPATIENT)
Dept: OBGYN CLINIC | Facility: CLINIC | Age: 48
End: 2018-09-07

## 2018-09-07 NOTE — TELEPHONE ENCOUNTER
[facility] if a replacement is needed you need to call [phone] dinh directly

## 2018-09-21 NOTE — TELEPHONE ENCOUNTER
Ms Perez Reasons we order another Mirena because the first one fall she bought the mirena to the office Ms Shan Manuel order a new one and spoke with the Pharmacy   But I try to give a call to the number you left on the message    Thank you

## 2018-10-25 ENCOUNTER — APPOINTMENT (OUTPATIENT)
Dept: LAB | Facility: HOSPITAL | Age: 48
End: 2018-10-25
Payer: COMMERCIAL

## 2018-10-25 ENCOUNTER — TELEPHONE (OUTPATIENT)
Dept: OBGYN CLINIC | Facility: CLINIC | Age: 48
End: 2018-10-25

## 2018-10-25 DIAGNOSIS — Z13.6 SCREENING FOR CARDIOVASCULAR CONDITION: ICD-10-CM

## 2018-10-25 DIAGNOSIS — E03.9 HYPOTHYROIDISM, ADULT: Primary | ICD-10-CM

## 2018-10-25 DIAGNOSIS — Z13.1 SCREENING FOR DIABETES MELLITUS (DM): ICD-10-CM

## 2018-10-25 DIAGNOSIS — E03.9 HYPOTHYROIDISM, ADULT: ICD-10-CM

## 2018-10-25 LAB
ALBUMIN SERPL BCP-MCNC: 3.7 G/DL (ref 3.5–5)
ALP SERPL-CCNC: 43 U/L (ref 46–116)
ALT SERPL W P-5'-P-CCNC: 44 U/L (ref 12–78)
ANION GAP SERPL CALCULATED.3IONS-SCNC: 9 MMOL/L (ref 4–13)
AST SERPL W P-5'-P-CCNC: 26 U/L (ref 5–45)
BASOPHILS # BLD AUTO: 0.05 THOUSANDS/ΜL (ref 0–0.1)
BASOPHILS NFR BLD AUTO: 1 % (ref 0–1)
BILIRUB SERPL-MCNC: 0.7 MG/DL (ref 0.2–1)
BUN SERPL-MCNC: 16 MG/DL (ref 5–25)
CALCIUM SERPL-MCNC: 9.2 MG/DL (ref 8.3–10.1)
CHLORIDE SERPL-SCNC: 103 MMOL/L (ref 100–108)
CHOLEST SERPL-MCNC: 180 MG/DL (ref 50–200)
CO2 SERPL-SCNC: 31 MMOL/L (ref 21–32)
CREAT SERPL-MCNC: 0.84 MG/DL (ref 0.6–1.3)
EOSINOPHIL # BLD AUTO: 0.16 THOUSAND/ΜL (ref 0–0.61)
EOSINOPHIL NFR BLD AUTO: 3 % (ref 0–6)
ERYTHROCYTE [DISTWIDTH] IN BLOOD BY AUTOMATED COUNT: 16.3 % (ref 11.6–15.1)
GFR SERPL CREATININE-BSD FRML MDRD: 83 ML/MIN/1.73SQ M
GLUCOSE P FAST SERPL-MCNC: 98 MG/DL (ref 65–99)
HCT VFR BLD AUTO: 40.7 % (ref 34.8–46.1)
HDLC SERPL-MCNC: 40 MG/DL (ref 40–60)
HGB BLD-MCNC: 12.6 G/DL (ref 11.5–15.4)
IMM GRANULOCYTES # BLD AUTO: 0.02 THOUSAND/UL (ref 0–0.2)
IMM GRANULOCYTES NFR BLD AUTO: 0 % (ref 0–2)
LDLC SERPL CALC-MCNC: 106 MG/DL (ref 0–100)
LYMPHOCYTES # BLD AUTO: 1.95 THOUSANDS/ΜL (ref 0.6–4.47)
LYMPHOCYTES NFR BLD AUTO: 32 % (ref 14–44)
MCH RBC QN AUTO: 25.5 PG (ref 26.8–34.3)
MCHC RBC AUTO-ENTMCNC: 31 G/DL (ref 31.4–37.4)
MCV RBC AUTO: 82 FL (ref 82–98)
MONOCYTES # BLD AUTO: 0.64 THOUSAND/ΜL (ref 0.17–1.22)
MONOCYTES NFR BLD AUTO: 11 % (ref 4–12)
NEUTROPHILS # BLD AUTO: 3.24 THOUSANDS/ΜL (ref 1.85–7.62)
NEUTS SEG NFR BLD AUTO: 53 % (ref 43–75)
NONHDLC SERPL-MCNC: 140 MG/DL
NRBC BLD AUTO-RTO: 0 /100 WBCS
PLATELET # BLD AUTO: 233 THOUSANDS/UL (ref 149–390)
PMV BLD AUTO: 10.6 FL (ref 8.9–12.7)
POTASSIUM SERPL-SCNC: 3.9 MMOL/L (ref 3.5–5.3)
PROT SERPL-MCNC: 7.6 G/DL (ref 6.4–8.2)
RBC # BLD AUTO: 4.94 MILLION/UL (ref 3.81–5.12)
SODIUM SERPL-SCNC: 143 MMOL/L (ref 136–145)
TRIGL SERPL-MCNC: 169 MG/DL
TSH SERPL DL<=0.05 MIU/L-ACNC: 5.04 UIU/ML (ref 0.36–3.74)
WBC # BLD AUTO: 6.06 THOUSAND/UL (ref 4.31–10.16)

## 2018-10-25 PROCEDURE — 84443 ASSAY THYROID STIM HORMONE: CPT

## 2018-10-25 PROCEDURE — 80061 LIPID PANEL: CPT

## 2018-10-25 PROCEDURE — 85025 COMPLETE CBC W/AUTO DIFF WBC: CPT

## 2018-10-25 PROCEDURE — 36415 COLL VENOUS BLD VENIPUNCTURE: CPT

## 2018-10-25 PROCEDURE — 80053 COMPREHEN METABOLIC PANEL: CPT

## 2018-10-25 RX ORDER — LEVOTHYROXINE SODIUM 0.15 MG/1
150 TABLET ORAL DAILY
Qty: 30 TABLET | Refills: 3 | Status: SHIPPED | OUTPATIENT
Start: 2018-10-25 | End: 2019-02-27 | Stop reason: SDUPTHER

## 2018-10-25 NOTE — TELEPHONE ENCOUNTER
Mirena arrived from [facility] as a replacement for the IUD that fell out in Aug  Pt notified  She would like to think about having this reinserted as she is very nervous about the possibility of it falling out again  She has an upcoming appt with Dr Fany Valles and will discuss then  The Mirena will be in the [facility] office until her decision

## 2018-11-06 ENCOUNTER — OFFICE VISIT (OUTPATIENT)
Dept: FAMILY MEDICINE CLINIC | Facility: CLINIC | Age: 48
End: 2018-11-06
Payer: COMMERCIAL

## 2018-11-06 VITALS
HEIGHT: 66 IN | DIASTOLIC BLOOD PRESSURE: 90 MMHG | OXYGEN SATURATION: 97 % | WEIGHT: 217 LBS | SYSTOLIC BLOOD PRESSURE: 132 MMHG | BODY MASS INDEX: 34.87 KG/M2 | HEART RATE: 86 BPM | TEMPERATURE: 97.8 F

## 2018-11-06 DIAGNOSIS — D25.0 INTRAMURAL, SUBMUCOUS, AND SUBSEROUS LEIOMYOMA OF UTERUS: Primary | ICD-10-CM

## 2018-11-06 DIAGNOSIS — D25.2 INTRAMURAL, SUBMUCOUS, AND SUBSEROUS LEIOMYOMA OF UTERUS: Primary | ICD-10-CM

## 2018-11-06 DIAGNOSIS — D25.1 INTRAMURAL, SUBMUCOUS, AND SUBSEROUS LEIOMYOMA OF UTERUS: Primary | ICD-10-CM

## 2018-11-06 PROCEDURE — 99212 OFFICE O/P EST SF 10 MIN: CPT | Performed by: PHYSICIAN ASSISTANT

## 2018-11-06 RX ORDER — TRANEXAMIC ACID 650 1/1
TABLET ORAL
Refills: 3 | COMMUNITY
Start: 2018-10-30 | End: 2019-09-24 | Stop reason: ALTCHOICE

## 2018-11-06 NOTE — PROGRESS NOTES
Assessment/Plan:       Diagnoses and all orders for this visit:    Intramural, submucous, and subserous leiomyoma of uterus    Other orders  -     Tranexamic Acid 650 MG TABS; TAKE 2 TABLETS BY MOUTH 3 TIMES A DAY FOR 5 DAYS        Patient will contact her gynecologist for follow-up in discuss her continued abnormal bleeding and fibroids  Subjective:      Patient ID: Chata Nelson is a 53 y o  female  Patient is here with an area in her abdomen that is tender and feels like a lump  Patient has known fibroids that her very large  Currently they are treating her with Depo-Provera to see if that will help with her periods and her fibroids  As of yet the Depo-Provera has not helped  Patient thinks this might be 1 of her fibroids that has enlarged  Abdominal Pain   This is a new problem  The current episode started 1 to 4 weeks ago  The onset quality is undetermined  The problem occurs intermittently  The problem has been unchanged  The pain is located in the generalized abdominal region  The pain is at a severity of 4/10  The quality of the pain is sharp  The abdominal pain radiates to the suprapubic region  Pertinent negatives include no anorexia, constipation, diarrhea, dysuria, fever, frequency, headaches, nausea or vomiting  The pain is aggravated by certain positions and palpation  The pain is relieved by nothing  She has tried nothing for the symptoms  The treatment provided no relief  Her past medical history is significant for abdominal surgery  The following portions of the patient's history were reviewed and updated as appropriate:   She has a past medical history of Disease of thyroid gland  ,   does not have any pertinent problems on file  ,   has a past surgical history that includes  section, low transverse; Cholecystectomy; and Tooth extraction  ,  family history is not on file  ,   reports that she has never smoked   She has never used smokeless tobacco  She reports that she does not drink alcohol or use drugs  ,  has No Known Allergies     Current Outpatient Prescriptions   Medication Sig Dispense Refill    hydrochlorothiazide (HYDRODIURIL) 25 mg tablet TAKE 1 TABLET IN THE MORNING 90 tablet 1    levothyroxine 150 mcg tablet Take 1 tablet (150 mcg total) by mouth daily 30 tablet 3    medroxyPROGESTERone (DEPO-PROVERA) 150 mg/mL injection Inject 1 mL (150 mg total) into a muscle every 3 (three) months 1 mL 0    Omega-3 Fatty Acids ([employer] NATURAL FISH OIL) 1000 MG CAPS Take by mouth      Tranexamic Acid 650 MG TABS TAKE 2 TABLETS BY MOUTH 3 TIMES A DAY FOR 5 DAYS  3    azelastine (ASTELIN) 0 1 % nasal spray INSTILL 1 SPRAY IN EACH NOSTRIL TWICE A DAY (Patient not taking: Reported on 11/6/2018) 30 mL 3    MIRENA, 52 MG, 20 MCG/24HR IUD       traMADol (ULTRAM) 50 mg tablet Take 1 tablet (50 mg total) by mouth every 6 (six) hours as needed for moderate pain (Patient not taking: Reported on 11/6/2018 ) 30 tablet 0     No current facility-administered medications for this visit  Review of Systems   Constitutional: Negative for fever  Gastrointestinal: Positive for abdominal pain  Negative for anorexia, constipation, diarrhea, nausea and vomiting  Genitourinary: Positive for menstrual problem and pelvic pain  Negative for dysuria and frequency  Neurological: Negative for headaches  Objective:  Vitals:    11/06/18 1310   BP: 132/90   Pulse: 86   Temp: 97 8 °F (36 6 °C)   SpO2: 97%   Weight: 98 4 kg (217 lb)   Height: 5' 6" (1 676 m)     Body mass index is 35 02 kg/m²  Physical Exam   Constitutional: She is oriented to person, place, and time  She appears well-developed and well-nourished  Abdominal:   Irregular shaped uterus is just to the umbilicus with a fibroid like mass coming off of the upper right of the uterus  It seems to be attached and a no other fibroid or an enlargement of her current fibroid     Neurological: She is alert and oriented to person, place, and time  Skin: Skin is dry  Psychiatric: She has a normal mood and affect  Her behavior is normal  Judgment and thought content normal    Nursing note and vitals reviewed

## 2018-11-08 ENCOUNTER — TELEPHONE (OUTPATIENT)
Dept: OBGYN CLINIC | Facility: CLINIC | Age: 48
End: 2018-11-08

## 2018-11-08 NOTE — TELEPHONE ENCOUNTER
Heavy bleeding caused previous mirena to come out  New one here - in [facility]  Has large fibroids  Lysteda helps  Spoke with pt  Went to pcp for large bulge above navel which hurts  PCP feels it is a fibroid   Pt has an appt 11/28 with you  Last u/s over 1 yr ago  Pt is interested in surgery  U/S now and discuss with you 11/28?????? She is presently not bleeding but was bleeding for 12 days   Took lysteda  Thanks  Not interested in another mirena

## 2018-11-08 NOTE — TELEPHONE ENCOUNTER
----- Message from Chriss Octnorah sent at 11/7/2018  8:57 PM EST -----  Regarding: Non-Urgent Medical Question  Contact: [phone]  I have been having some abdominal pain, especially if I lean against something or pick something up  Plus I had my period again for 12 days  I went to see my family Dr   About the pain   she believes it's from the fibroids  When I come see you next time can we discuss the next steps

## 2018-11-12 ENCOUNTER — TELEPHONE (OUTPATIENT)
Dept: OBGYN CLINIC | Facility: CLINIC | Age: 48
End: 2018-11-12

## 2018-11-12 NOTE — TELEPHONE ENCOUNTER
Patient has had complete workup  She has a large fibroid uterus  She is not anemic  Her biopsy was benign  I will call her

## 2018-11-12 NOTE — TELEPHONE ENCOUNTER
----- Message from Lashonda Short sent at 11/12/2018 12:14 PM EST -----  Regarding: Non-Urgent Medical Question  Contact: [phone]  I spoke with a nurse - Dwight Riojas - last week  I would like to discuss surgical options at my next appointment - I had my period for 16 days this time around - and the one fibroid is becoming painful all the time  If there are any tests - can we schedule them prior to the appointment so  that we can really discuss what to do?

## 2018-11-12 NOTE — TELEPHONE ENCOUNTER
Please read my note from 11/8 also, Pt recalled again - any further tests needed prior to appt   Thank you

## 2018-11-25 DIAGNOSIS — N92.0 MENORRHAGIA WITH REGULAR CYCLE: ICD-10-CM

## 2018-11-26 RX ORDER — MEDROXYPROGESTERONE ACETATE 150 MG/ML
150 INJECTION, SUSPENSION INTRAMUSCULAR
Qty: 1 ML | Refills: 0 | Status: SHIPPED | OUTPATIENT
Start: 2018-11-26 | End: 2019-02-14 | Stop reason: SDUPTHER

## 2018-11-28 ENCOUNTER — OFFICE VISIT (OUTPATIENT)
Dept: OBGYN CLINIC | Facility: MEDICAL CENTER | Age: 48
End: 2018-11-28
Payer: COMMERCIAL

## 2018-11-28 ENCOUNTER — APPOINTMENT (OUTPATIENT)
Dept: LAB | Facility: MEDICAL CENTER | Age: 48
End: 2018-11-28
Payer: COMMERCIAL

## 2018-11-28 VITALS
BODY MASS INDEX: 34.42 KG/M2 | HEIGHT: 67 IN | RESPIRATION RATE: 14 BRPM | SYSTOLIC BLOOD PRESSURE: 130 MMHG | DIASTOLIC BLOOD PRESSURE: 78 MMHG | WEIGHT: 219.3 LBS

## 2018-11-28 DIAGNOSIS — N92.1 MENORRHAGIA WITH IRREGULAR CYCLE: ICD-10-CM

## 2018-11-28 DIAGNOSIS — Z30.42 ENCOUNTER FOR DEPO-PROVERA CONTRACEPTION: ICD-10-CM

## 2018-11-28 DIAGNOSIS — N92.1 MENORRHAGIA WITH IRREGULAR CYCLE: Primary | ICD-10-CM

## 2018-11-28 LAB — T4 FREE SERPL-MCNC: 1.08 NG/DL (ref 0.76–1.46)

## 2018-11-28 PROCEDURE — 99214 OFFICE O/P EST MOD 30 MIN: CPT | Performed by: OBSTETRICS & GYNECOLOGY

## 2018-11-28 PROCEDURE — 84439 ASSAY OF FREE THYROXINE: CPT

## 2018-11-28 PROCEDURE — 36415 COLL VENOUS BLD VENIPUNCTURE: CPT

## 2018-11-28 PROCEDURE — 96372 THER/PROPH/DIAG INJ SC/IM: CPT | Performed by: OBSTETRICS & GYNECOLOGY

## 2018-11-28 RX ORDER — MEDROXYPROGESTERONE ACETATE 150 MG/ML
150 INJECTION, SUSPENSION INTRAMUSCULAR ONCE
Status: COMPLETED | OUTPATIENT
Start: 2018-11-28 | End: 2018-11-28

## 2018-11-28 RX ADMIN — MEDROXYPROGESTERONE ACETATE 150 MG: 150 INJECTION, SUSPENSION INTRAMUSCULAR at 14:35

## 2018-11-28 NOTE — PROGRESS NOTES
Assessment/Plan:      Diagnoses and all orders for this visit:    Menorrhagia with irregular cycle  -     T4, free; Future  -     medroxyPROGESTERone (DEPO-PROVERA) IM injection 150 mg; Inject 1 mL (150 mg total) into a muscle once     Encounter for Depo-Provera contraception  -     medroxyPROGESTERone (DEPO-PROVERA) IM injection 150 mg; Inject 1 mL (150 mg total) into a muscle once           Subjective:     Patient ID: Morris Fallon is a 53 y o  female  Patient is a 60-year-old female, P1, , here for discussion of menorrhagia with a regular cycle  Patient was medically managed with Mirena and Lysteda recently her Mirena was past with heavy menses  Patient has a known enlarged fibroid uterus largest dimension 15 cm  Ovaries are normal on ultrasound  We discussed in detail the risks benefits and alternatives of continuation with Mirena, uterine artery embolization, and hysterectomy  I recommended trying another Mirena  Patient received Depo-Provera today as a bridge well she contemplate her options  We spent 40 minutes,  greater than 50% of this time was spent counseling and coordinating care  Review of Systems   Gastrointestinal: Negative for abdominal pain  Genitourinary: Negative for pelvic pain  Objective:     Physical Exam   Constitutional: She is oriented to person, place, and time  She appears well-developed and well-nourished  Pulmonary/Chest: Effort normal    Neurological: She is alert and oriented to person, place, and time  Skin: Skin is warm  Psychiatric: She has a normal mood and affect   Her behavior is normal

## 2018-12-17 ENCOUNTER — TELEPHONE (OUTPATIENT)
Dept: OBGYN CLINIC | Facility: CLINIC | Age: 48
End: 2018-12-17

## 2018-12-17 NOTE — TELEPHONE ENCOUNTER
----- Message from Elham Godoy sent at 12/17/2018 11:03 AM EST -----  Regarding: RE:Your Recent Visit  Contact: [phone]  Hi Dr Giovanni Scheuermann -     After reading everything you gave me and looking online  I want to to the uterine artery embolization in 2019  Let me know what I need to get it accomplished - after tax season - April 2019  I still want to avoid a hysterectomy if I can  Thank you for all the information and time explaining everything  Ann Offer  ----- Message -----  From: Silver Olivares MD  Sent: 11/28/2018  2:36 PM EST  To: Elham Godoy  Subject: Your Recent Visit  Elham Godoy, you have a [facility]  Please click on visits and then your most recent appointment, to view your After Visit Summary  If you are experiencing any technical issues please call our patient service desk at [phone] option [phone]

## 2019-02-05 ENCOUNTER — OFFICE VISIT (OUTPATIENT)
Dept: FAMILY MEDICINE CLINIC | Facility: CLINIC | Age: 49
End: 2019-02-05
Payer: COMMERCIAL

## 2019-02-05 VITALS
BODY MASS INDEX: 33.27 KG/M2 | OXYGEN SATURATION: 98 % | SYSTOLIC BLOOD PRESSURE: 136 MMHG | TEMPERATURE: 98.7 F | HEART RATE: 85 BPM | DIASTOLIC BLOOD PRESSURE: 88 MMHG | HEIGHT: 67 IN | WEIGHT: 212 LBS

## 2019-02-05 DIAGNOSIS — J30.1 SEASONAL ALLERGIC RHINITIS DUE TO POLLEN: ICD-10-CM

## 2019-02-05 DIAGNOSIS — L30.9 DERMATITIS: Primary | ICD-10-CM

## 2019-02-05 PROBLEM — R21 RASH OF UNKNOWN CAUSE: Status: ACTIVE | Noted: 2019-02-05

## 2019-02-05 PROCEDURE — 99213 OFFICE O/P EST LOW 20 MIN: CPT | Performed by: PHYSICIAN ASSISTANT

## 2019-02-05 RX ORDER — AZELASTINE 1 MG/ML
2 SPRAY, METERED NASAL 2 TIMES DAILY
Qty: 30 ML | Refills: 3 | Status: SHIPPED | OUTPATIENT
Start: 2019-02-05 | End: 2019-09-24 | Stop reason: ALTCHOICE

## 2019-02-05 RX ORDER — TRIAMCINOLONE ACETONIDE 1 MG/G
CREAM TOPICAL 2 TIMES DAILY
Qty: 45 G | Refills: 0 | Status: ON HOLD | OUTPATIENT
Start: 2019-02-05 | End: 2019-04-22

## 2019-02-05 NOTE — PROGRESS NOTES
Assessment/Plan:       Diagnoses and all orders for this visit:    Dermatitis  -     triamcinolone (KENALOG) 0 1 % cream; Apply topically 2 (two) times a day    Seasonal allergic rhinitis due to pollen  -     azelastine (ASTELIN) 0 1 % nasal spray; 2 sprays into each nostril 2 (two) times a day Use in each nostril as directed            Subjective:      Patient ID: Yash Mehta is a 53 y o  female  Rash   This is a new problem  The current episode started 1 to 4 weeks ago  The problem has been gradually worsening since onset  The affected locations include the left elbow and left lower leg  The rash is characterized by burning, itchiness and dryness  It is unknown if there was an exposure to a precipitant  Associated symptoms include congestion  Pertinent negatives include no anorexia, cough, diarrhea, eye pain, facial edema, fatigue, fever, joint pain, nail changes, rhinorrhea, shortness of breath, sore throat or vomiting  Past treatments include antihistamine, anti-itch cream and moisturizer  The treatment provided no relief  Earache    There is pain in the left ear  This is a new problem  The current episode started in the past 7 days  The problem occurs every few minutes  The problem has been unchanged  There has been no fever  The pain is at a severity of 8/10  Associated symptoms include a rash  Pertinent negatives include no abdominal pain, coughing, diarrhea, ear discharge, headaches, hearing loss, neck pain, rhinorrhea, sore throat or vomiting  She has tried nothing for the symptoms  The following portions of the patient's history were reviewed and updated as appropriate:   She has a past medical history of Disease of thyroid gland  ,   does not have any pertinent problems on file  ,   has a past surgical history that includes  section, low transverse; Cholecystectomy; and Tooth extraction  ,  family history is not on file  ,   reports that she has never smoked   She has never used smokeless tobacco  She reports that she does not drink alcohol or use drugs  ,  has No Known Allergies     Current Outpatient Prescriptions   Medication Sig Dispense Refill    azelastine (ASTELIN) 0 1 % nasal spray 2 sprays into each nostril 2 (two) times a day Use in each nostril as directed 30 mL 3    hydrochlorothiazide (HYDRODIURIL) 25 mg tablet TAKE 1 TABLET IN THE MORNING 90 tablet 1    levothyroxine 150 mcg tablet Take 1 tablet (150 mcg total) by mouth daily 30 tablet 3    medroxyPROGESTERone (DEPO-PROVERA) 150 mg/mL injection INJECT 1 ML (150 MG TOTAL) INTO A MUSCLE EVERY 3 (THREE) MONTHS 1 mL 0    MIRENA, 52 MG, 20 MCG/24HR IUD       Omega-3 Fatty Acids ([employer] NATURAL FISH OIL) 1000 MG CAPS Take by mouth      traMADol (ULTRAM) 50 mg tablet Take 1 tablet (50 mg total) by mouth every 6 (six) hours as needed for moderate pain 30 tablet 0    Tranexamic Acid 650 MG TABS TAKE 2 TABLETS BY MOUTH 3 TIMES A DAY FOR 5 DAYS  3    triamcinolone (KENALOG) 0 1 % cream Apply topically 2 (two) times a day 45 g 0     No current facility-administered medications for this visit  Review of Systems   Constitutional: Negative for activity change, chills, fatigue and fever  HENT: Positive for congestion, ear pain and sinus pressure  Negative for dental problem, ear discharge, hearing loss, rhinorrhea, sore throat and trouble swallowing  Eyes: Negative for pain  Respiratory: Negative for cough, chest tightness and shortness of breath  Gastrointestinal: Negative for abdominal pain, anorexia, diarrhea and vomiting  Musculoskeletal: Negative for joint pain and neck pain  Skin: Positive for rash  Negative for nail changes  Allergic/Immunologic: Negative for environmental allergies and food allergies  Neurological: Negative for dizziness, light-headedness and headaches           Objective:  Vitals:    02/05/19 1136   BP: 136/88   Pulse: 85   Temp: 98 7 °F (37 1 °C)   SpO2: 98%   Weight: 96 2 kg (212 lb) Height: 5' 6 5" (1 689 m)     Body mass index is 33 71 kg/m²  Physical Exam   Constitutional: She is oriented to person, place, and time  She appears well-developed and well-nourished  HENT:   Head: Normocephalic  Right Ear: Tympanic membrane, external ear and ear canal normal    Left Ear: Tympanic membrane, external ear and ear canal normal    Nose: Nose normal    Mouth/Throat: Uvula is midline and oropharynx is clear and moist    Neurological: She is alert and oriented to person, place, and time  Skin: Rash noted  There is erythema  Fine slightly red rash in left antecubital area and posterior knee  Psychiatric: She has a normal mood and affect  Her behavior is normal  Judgment and thought content normal    Nursing note and vitals reviewed

## 2019-02-14 ENCOUNTER — OFFICE VISIT (OUTPATIENT)
Dept: OBGYN CLINIC | Facility: MEDICAL CENTER | Age: 49
End: 2019-02-14
Payer: COMMERCIAL

## 2019-02-14 VITALS
RESPIRATION RATE: 14 BRPM | DIASTOLIC BLOOD PRESSURE: 78 MMHG | SYSTOLIC BLOOD PRESSURE: 140 MMHG | WEIGHT: 209 LBS | BODY MASS INDEX: 33.59 KG/M2 | HEIGHT: 66 IN

## 2019-02-14 DIAGNOSIS — D25.9 UTERINE LEIOMYOMA, UNSPECIFIED LOCATION: ICD-10-CM

## 2019-02-14 DIAGNOSIS — Z00.00 HEALTH CARE MAINTENANCE: Primary | ICD-10-CM

## 2019-02-14 DIAGNOSIS — N92.0 MENORRHAGIA WITH REGULAR CYCLE: ICD-10-CM

## 2019-02-14 DIAGNOSIS — Z30.42 ENCOUNTER FOR DEPO-PROVERA CONTRACEPTION: Primary | ICD-10-CM

## 2019-02-14 PROCEDURE — 99213 OFFICE O/P EST LOW 20 MIN: CPT | Performed by: OBSTETRICS & GYNECOLOGY

## 2019-02-14 PROCEDURE — 96372 THER/PROPH/DIAG INJ SC/IM: CPT | Performed by: OBSTETRICS & GYNECOLOGY

## 2019-02-14 RX ORDER — MEDROXYPROGESTERONE ACETATE 150 MG/ML
150 INJECTION, SUSPENSION INTRAMUSCULAR
Qty: 1 ML | Refills: 0 | Status: SHIPPED | OUTPATIENT
Start: 2019-02-14 | End: 2019-02-14 | Stop reason: CLARIF

## 2019-02-14 RX ORDER — MEDROXYPROGESTERONE ACETATE 150 MG/ML
150 INJECTION, SUSPENSION INTRAMUSCULAR
Qty: 1 ML | Refills: 3 | Status: SHIPPED | OUTPATIENT
Start: 2019-02-14 | End: 2020-03-30 | Stop reason: SDUPTHER

## 2019-02-14 RX ORDER — MEDROXYPROGESTERONE ACETATE 150 MG/ML
150 INJECTION, SUSPENSION INTRAMUSCULAR
Status: DISCONTINUED | OUTPATIENT
Start: 2019-02-14 | End: 2022-01-14

## 2019-02-14 RX ADMIN — MEDROXYPROGESTERONE ACETATE 150 MG: 150 INJECTION, SUSPENSION INTRAMUSCULAR at 13:27

## 2019-02-14 NOTE — PROGRESS NOTES
Patient received her Depo Injection on her left deltoid area   NDN-[id number]  Expires -05/2021  NDC# [id number]  Next Depo Injection should be given by 05/2/2019-05/16/2019

## 2019-02-14 NOTE — PROGRESS NOTES
Assessment/Plan:      Diagnoses and all orders for this visit:    Encounter for Depo-Provera contraception  -     medroxyPROGESTERone (DEPO-PROVERA) IM injection 150 mg    Uterine leiomyoma, unspecified location  -     IR [place]; Future          Subjective:     Patient ID: Patrice Quintanilla is a 53 y o  female  Patient is a 52-year-old female para 1 with a 15 cm fibroid uterus here for follow-up  Currently we are bridging her with Depo-Provera until she determines a more definitive treatment  Patient had a Mirena in the past that she passed with heavy menses  Patient would like to proceed with a uterine artery embolization      Review of Systems      Objective:     Physical Exam   Constitutional: She is oriented to person, place, and time  She appears well-developed and well-nourished  Pulmonary/Chest: Effort normal    Neurological: She is alert and oriented to person, place, and time  Psychiatric: She has a normal mood and affect   Her behavior is normal

## 2019-02-15 ENCOUNTER — TELEPHONE (OUTPATIENT)
Dept: OBGYN CLINIC | Facility: MEDICAL CENTER | Age: 49
End: 2019-02-15

## 2019-02-15 DIAGNOSIS — D21.9 FIBROIDS: ICD-10-CM

## 2019-02-15 DIAGNOSIS — N92.0 MENORRHAGIA WITH REGULAR CYCLE: Primary | ICD-10-CM

## 2019-02-15 NOTE — TELEPHONE ENCOUNTER
Patient had an appointment with Dr Enid Jackson yesterday (2/14/19) and she was told that he gave her an incorrect order, that she needs an order for a pelvic MRI

## 2019-02-23 DIAGNOSIS — N92.0 MENORRHAGIA WITH REGULAR CYCLE: ICD-10-CM

## 2019-02-25 RX ORDER — MEDROXYPROGESTERONE ACETATE 150 MG/ML
150 INJECTION, SUSPENSION INTRAMUSCULAR
Qty: 1 ML | Refills: 0 | Status: SHIPPED | OUTPATIENT
Start: 2019-02-25 | End: 2019-05-01 | Stop reason: SDUPTHER

## 2019-02-26 ENCOUNTER — HOSPITAL ENCOUNTER (OUTPATIENT)
Dept: MRI IMAGING | Facility: HOSPITAL | Age: 49
Discharge: HOME | End: 2019-02-26
Attending: OBSTETRICS & GYNECOLOGY
Payer: COMMERCIAL

## 2019-02-26 DIAGNOSIS — D21.9 FIBROIDS: ICD-10-CM

## 2019-02-26 DIAGNOSIS — N92.0 MENORRHAGIA WITH REGULAR CYCLE: ICD-10-CM

## 2019-02-26 PROCEDURE — A9585 GADOBUTROL INJECTION: HCPCS | Performed by: RADIOLOGY

## 2019-02-26 PROCEDURE — 72197 MRI PELVIS W/O & W/DYE: CPT

## 2019-02-26 RX ADMIN — GADOBUTROL 9 ML: 604.72 INJECTION INTRAVENOUS at 16:29

## 2019-02-27 ENCOUNTER — TELEPHONE (OUTPATIENT)
Dept: OBGYN CLINIC | Facility: CLINIC | Age: 49
End: 2019-02-27

## 2019-02-27 DIAGNOSIS — I10 HYPERTENSION, ESSENTIAL: ICD-10-CM

## 2019-02-27 DIAGNOSIS — E03.9 HYPOTHYROIDISM, ADULT: ICD-10-CM

## 2019-02-27 RX ORDER — LEVOTHYROXINE SODIUM 0.15 MG/1
150 TABLET ORAL DAILY
Qty: 30 TABLET | Refills: 3 | Status: SHIPPED | OUTPATIENT
Start: 2019-02-27 | End: 2019-07-13 | Stop reason: SDUPTHER

## 2019-02-27 NOTE — TELEPHONE ENCOUNTER
Incoming call from Radiology  Calling to report there are significant findings on MRI of pelvis  Report in Epic available for review

## 2019-02-28 ENCOUNTER — OFFICE VISIT (OUTPATIENT)
Dept: VASCULAR SURGERY | Facility: CLINIC | Age: 49
End: 2019-02-28
Payer: COMMERCIAL

## 2019-02-28 VITALS
DIASTOLIC BLOOD PRESSURE: 80 MMHG | TEMPERATURE: 100 F | HEIGHT: 66 IN | BODY MASS INDEX: 32.28 KG/M2 | SYSTOLIC BLOOD PRESSURE: 128 MMHG

## 2019-02-28 DIAGNOSIS — D25.1 INTRAMURAL, SUBMUCOUS, AND SUBSEROUS LEIOMYOMA OF UTERUS: Primary | ICD-10-CM

## 2019-02-28 DIAGNOSIS — D25.0 INTRAMURAL, SUBMUCOUS, AND SUBSEROUS LEIOMYOMA OF UTERUS: Primary | ICD-10-CM

## 2019-02-28 DIAGNOSIS — D25.2 INTRAMURAL, SUBMUCOUS, AND SUBSEROUS LEIOMYOMA OF UTERUS: Primary | ICD-10-CM

## 2019-02-28 PROCEDURE — 99204 OFFICE O/P NEW MOD 45 MIN: CPT | Performed by: RADIOLOGY

## 2019-02-28 RX ORDER — HYDROCHLOROTHIAZIDE 25 MG/1
25 TABLET ORAL EVERY MORNING
Qty: 90 TABLET | Refills: 0 | Status: SHIPPED | OUTPATIENT
Start: 2019-02-28 | End: 2019-06-16 | Stop reason: SDUPTHER

## 2019-02-28 NOTE — PROGRESS NOTES
Assessment/Plan:    Intramural, submucous, and subserous leiomyoma of uterus  Ms Olivia Márquez is a 60-year-old female with medical history of menorrhagia and fibroid uterus  Patient is seen in IR clinic for consideration of uterine artery embolization  I discussed the different options to treat uterine fibroids and menorrhagia including watchful waiting, medical therapy, surgery, and uterine artery embolization  I discussed with the patient that hysterectomy is a curative therapy because the uterus is completely removed  I discussed the benefits, risks and procedure details of uterine artery embolization  Specifically, I discussed that the procedure is often done using moderate sedation and that favorable outcomes are generally seen in roughly 90% of women with an approximate 10% reintervention rate either with repeat uterine artery embolization or hysterectomy  I did mention that her uterus is quite large with numerous fibroids  I did state that she could experience a significant amount of abdominal pelvic pain following the embolization  I also stated that she would likely have reasonable improvement of her menorrhagia  Her bulk symptoms will improve but to what degree is unknown given her large fibroid uterus  I discussed that we often admit overnight for observation and management of pain  I discussed risks such as bleeding, infection, nontarget embolization, myometrial injury, and transient or permanent amenorrhea (1 to 5%)  Rarely, pulmonary embolism  Additionally, I discussed that patients need to be willing to undergo hysterectomy in the event of [place] complication such a possible sloughed fibroid with non passage resulting in infection  This could occur anywhere from 3 weeks to 6 months  I also discussed the possibility of extra uterine arterial supply such as the ovarian artery  I discussed the risk of embolization of this artery if it is found to be a major supplier to the uterus  The patient wishes to have this artery embolized if it is found to be a major uterine supplying artery  I then explained that we would see the patient in clinic 1 month following the procedure and possible six-month visit with imaging  The patient acknowledged and understood the discussion  I answered all of her questions to her apparent satisfaction  Diagnoses and all orders for this visit:    Intramural, submucous, and subserous leiomyoma of uterus               Patient ID: Ingris Carmichael is a 53 y o  female  Chief complaint: Pt is new to our office here to [place] EMBOLIZATION/MRI 19  PT WAS REF'D BY DR Wandra Prader  Ms Everton Hi is a 60-year-old female referred to IR clinic by Dr Gerald Sanon for consideration of uterine artery embolization  Patient reports 2 and half year history of menorrhagia, generally regular menstrual cycle and increasing pelvic pain exacerbated during her menstrual cycle  Patient reports mass-effect within the lower abdomen as she can palpate the fibroids and reports significant discomfort  Patient reports no improvement of her menorrhagia with Lysteda  Patient did have a history of IUD which became dislodged  Patient at this time does not want another IUD  Patient does not want to have a hysterectomy due to concern for prolonged recovery period  Patient has 1 child, delivered via , denies current pregnancy and has no desire for future pregnancy  PAP smear on 2017 was negative for intraepithelial lesion or malignancy  Endometrial biopsy on 2018 was negative for atypia or malignancy  The following portions of the patient's history were reviewed and updated as appropriate: allergies, current medications, past family history, past medical history, past social history, past surgical history and problem list     Review of Systems   Constitutional: Negative  HENT: Negative  Eyes: Negative  Respiratory: Negative  Cardiovascular: Negative  Gastrointestinal: Positive for abdominal pain  Endocrine: Negative  Genitourinary: Positive for menstrual problem, pelvic pain and vaginal bleeding  Negative for urgency  Musculoskeletal: Negative  Skin: Negative  Allergic/Immunologic: Negative  Neurological: Negative  Hematological: Negative  Psychiatric/Behavioral: Negative  Objective:    I have reviewed and made appropriate changes to the review of systems input by the medical assistant  Vitals:    19 1343   BP: 128/80   BP Location: Right arm   Patient Position: Sitting   Cuff Size: Adult   Temp: 100 °F (37 8 °C)   TempSrc: Tympanic   Height: 5' 6" (1 676 m)       Patient Active Problem List   Diagnosis    Abnormal mammogram    Benign essential hypertension    Hypertriglyceridemia without hypercholesterolemia    Hypothyroidism    Intramural, submucous, and subserous leiomyoma of uterus    Rash of unknown cause    Seasonal allergic rhinitis due to pollen    Dermatitis       Past Surgical History:   Procedure Laterality Date     SECTION, LOW TRANSVERSE      Last Assessed: 5/15/2015    CHOLECYSTECTOMY      Last Assessed: 5/15/2015     TOOTH EXTRACTION      Last Assessed: 5/15/2015       No family history on file      Social History     Socioeconomic History    Marital status: /Civil Union     Spouse name: Not on file    Number of children: Not on file    Years of education: Not on file    Highest education level: Not on file   Occupational History    Not on file   Social Needs    Financial resource strain: Not on file    Food insecurity:     Worry: Not on file     Inability: Not on file    Transportation needs:     Medical: Not on file     Non-medical: Not on file   Tobacco Use    Smoking status: Never Smoker    Smokeless tobacco: Never Used   Substance and Sexual Activity    Alcohol use: No    Drug use: No    Sexual activity: Yes     Birth control/protection: Injection     Comment: Depo injection     Lifestyle    Physical activity:     Days per week: Not on file     Minutes per session: Not on file    Stress: Not on file   Relationships    Social connections:     Talks on phone: Not on file     Gets together: Not on file     Attends Scientologist service: Not on file     Active member of club or organization: Not on file     Attends meetings of clubs or organizations: Not on file     Relationship status: Not on file    Intimate partner violence:     Fear of current or ex partner: Not on file     Emotionally abused: Not on file     Physically abused: Not on file     Forced sexual activity: Not on file   Other Topics Concern    Not on file   Social History Narrative    Always uses seat belt     Exercises regularly     Seeing a dentist        No Known Allergies      Current Outpatient Medications:     azelastine (ASTELIN) 0 1 % nasal spray, 2 sprays into each nostril 2 (two) times a day Use in each nostril as directed, Disp: 30 mL, Rfl: 3    hydrochlorothiazide (HYDRODIURIL) 25 mg tablet, Take 1 tablet (25 mg total) by mouth every morning, Disp: 90 tablet, Rfl: 0    levothyroxine 150 mcg tablet, Take 1 tablet (150 mcg total) by mouth daily, Disp: 30 tablet, Rfl: 3    medroxyPROGESTERone (DEPO-PROVERA) 150 mg/mL injection, Inject 1 mL (150 mg total) into a muscle every 3 (three) months, Disp: 1 mL, Rfl: 3    medroxyPROGESTERone (DEPO-PROVERA) 150 mg/mL injection, INJECT 1 ML (150 MG TOTAL) INTO A MUSCLE EVERY 3 (THREE) MONTHS, Disp: 1 mL, Rfl: 0    MIRENA, 52 MG, 20 MCG/24HR IUD, , Disp: , Rfl:     Omega-3 Fatty Acids ([employer] NATURAL FISH OIL) 1000 MG CAPS, Take by mouth, Disp: , Rfl:     traMADol (ULTRAM) 50 mg tablet, Take 1 tablet (50 mg total) by mouth every 6 (six) hours as needed for moderate pain, Disp: 30 tablet, Rfl: 0    Tranexamic Acid 650 MG TABS, TAKE 2 TABLETS BY MOUTH 3 TIMES A DAY FOR 5 DAYS, Disp: , Rfl: 3    triamcinolone (KENALOG) 0 1 % cream, Apply topically 2 (two) times a day, Disp: 45 g, Rfl: 0    Current Facility-Administered Medications:     medroxyPROGESTERone (DEPO-PROVERA) IM injection 150 mg, 150 mg, Intramuscular, Q3 Months, Skyler Douglass MD, 150 mg at 02/14/19 1327       Physical Exam   Constitutional: She is oriented to person, place, and time  She appears well-developed and well-nourished  HENT:   Head: Normocephalic and atraumatic  Eyes: Pupils are equal, round, and reactive to light  EOM are normal    Neck: Normal range of motion  Neck supple  Cardiovascular: Normal rate, regular rhythm and normal heart sounds  Pulmonary/Chest: Effort normal and breath sounds normal    Abdominal: Soft  She exhibits no distension  There is tenderness (mild TTP with deep palpation)  There is no rebound and no guarding  Musculoskeletal: Normal range of motion  Neurological: She is alert and oriented to person, place, and time  Skin: Skin is warm and dry  Psychiatric: She has a normal mood and affect  Her behavior is normal          Imaging:  I personally reviewed her imaging  I reviewed her imaging at during the office visit  MRI pelvis dated 2/26/2019:  Markedly enlarged uterus measuring 17 x 15 x 13 centimeters with intramural, subserosal and submucosal fibroids      Labs:  10/25/2018-hemoglobin 12 6, platelets 712, creatinine 0 84

## 2019-03-01 NOTE — ASSESSMENT & PLAN NOTE
Ms Emperatriz Gonzales is a 72-year-old female with medical history of menorrhagia and fibroid uterus  Patient is seen in IR clinic for consideration of uterine artery embolization  I discussed the different options to treat uterine fibroids and menorrhagia including watchful waiting, medical therapy, surgery, and uterine artery embolization  I discussed with the patient that hysterectomy is a curative therapy because the uterus is completely removed  I discussed the benefits, risks and procedure details of uterine artery embolization  Specifically, I discussed that the procedure is often done using moderate sedation and that favorable outcomes are generally seen in roughly 90% of women with an approximate 10% reintervention rate either with repeat uterine artery embolization or hysterectomy  I did mention that her uterus is quite large with numerous fibroids  I did state that she could experience a significant amount of abdominal pelvic pain following the embolization  I also stated that she would likely have reasonable improvement of her menorrhagia  Her bulk symptoms will improve but to what degree is unknown given her large fibroid uterus  I discussed that we often admit overnight for observation and management of pain  I discussed risks such as bleeding, infection, nontarget embolization, myometrial injury, and transient or permanent amenorrhea (1 to 5%)  Rarely, pulmonary embolism  Additionally, I discussed that patients need to be willing to undergo hysterectomy in the event of [place] complication such a possible sloughed fibroid with non passage resulting in infection  This could occur anywhere from 3 weeks to 6 months  I also discussed the possibility of extra uterine arterial supply such as the ovarian artery  I discussed the risk of embolization of this artery if it is found to be a major supplier to the uterus    The patient wishes to have this artery embolized if it is found to be a major uterine supplying artery  I then explained that we would see the patient in clinic 1 month following the procedure and possible six-month visit with imaging  The patient acknowledged and understood the discussion  I answered all of her questions to her apparent satisfaction

## 2019-03-05 ENCOUNTER — TELEPHONE (OUTPATIENT)
Dept: RADIOLOGY | Facility: HOSPITAL | Age: 49
End: 2019-03-05

## 2019-03-18 RX ORDER — MEDROXYPROGESTERONE ACETATE 150 MG/ML
150 INJECTION, SUSPENSION INTRAMUSCULAR ONCE
Status: COMPLETED | OUTPATIENT
Start: 2019-03-18 | End: 2019-05-08

## 2019-03-22 ENCOUNTER — TELEPHONE (OUTPATIENT)
Dept: RADIOLOGY | Facility: HOSPITAL | Age: 49
End: 2019-03-22

## 2019-03-22 NOTE — TELEPHONE ENCOUNTER
Spoke with Ms Nu Mayfield to discuss her question of embolization of the ovarian arteries regardless of supply to the uterus in hope of initiating early menopause  I explained that more recent literature shows a very small risk of early menopause following ovarian artery embolization  I stated that if the ovarian arteries are large and appear to supply the uterus that I will embolize them; however, if they are very small I will not embolize them

## 2019-04-18 ENCOUNTER — TELEPHONE (OUTPATIENT)
Dept: RADIOLOGY | Facility: HOSPITAL | Age: 49
End: 2019-04-18

## 2019-04-18 RX ORDER — KETOROLAC TROMETHAMINE 30 MG/ML
30 INJECTION, SOLUTION INTRAMUSCULAR; INTRAVENOUS 2 TIMES DAILY
Status: CANCELLED | OUTPATIENT
Start: 2019-04-18 | End: 2019-04-19

## 2019-04-18 RX ORDER — SODIUM CHLORIDE 9 MG/ML
75 INJECTION, SOLUTION INTRAVENOUS CONTINUOUS
Status: CANCELLED | OUTPATIENT
Start: 2019-04-18

## 2019-04-18 RX ORDER — NAPROXEN 500 MG/1
500 TABLET ORAL ONCE
Status: CANCELLED | OUTPATIENT
Start: 2019-04-18

## 2019-04-18 RX ORDER — CEFAZOLIN SODIUM 2 G/50ML
2000 SOLUTION INTRAVENOUS ONCE
Status: CANCELLED | OUTPATIENT
Start: 2019-04-18 | End: 2019-04-18

## 2019-04-18 RX ORDER — ONDANSETRON 2 MG/ML
4 INJECTION INTRAMUSCULAR; INTRAVENOUS EVERY 6 HOURS PRN
Status: CANCELLED | OUTPATIENT
Start: 2019-04-18

## 2019-04-18 RX ORDER — OXYCODONE HYDROCHLORIDE AND ACETAMINOPHEN 5; 325 MG/1; MG/1
1 TABLET ORAL 2 TIMES DAILY
Status: CANCELLED | OUTPATIENT
Start: 2019-04-18

## 2019-04-18 RX ORDER — DEXAMETHASONE SODIUM PHOSPHATE 10 MG/ML
10 INJECTION, SOLUTION INTRAMUSCULAR; INTRAVENOUS ONCE
Status: CANCELLED | OUTPATIENT
Start: 2019-04-18

## 2019-04-20 ENCOUNTER — TELEPHONE (OUTPATIENT)
Dept: INPATIENT UNIT | Facility: HOSPITAL | Age: 49
End: 2019-04-20

## 2019-04-22 ENCOUNTER — HOSPITAL ENCOUNTER (OUTPATIENT)
Dept: RADIOLOGY | Facility: HOSPITAL | Age: 49
Setting detail: OBSERVATION
Discharge: HOME | End: 2019-04-23
Attending: OBSTETRICS & GYNECOLOGY | Admitting: INTERNAL MEDICINE
Payer: COMMERCIAL

## 2019-04-22 DIAGNOSIS — D25.9 UTERINE LEIOMYOMA, UNSPECIFIED LOCATION: Primary | ICD-10-CM

## 2019-04-22 DIAGNOSIS — D25.1 INTRAMURAL, SUBMUCOUS, AND SUBSEROUS LEIOMYOMA OF UTERUS: Primary | ICD-10-CM

## 2019-04-22 DIAGNOSIS — D25.2 INTRAMURAL, SUBMUCOUS, AND SUBSEROUS LEIOMYOMA OF UTERUS: Primary | ICD-10-CM

## 2019-04-22 DIAGNOSIS — D25.0 INTRAMURAL, SUBMUCOUS, AND SUBSEROUS LEIOMYOMA OF UTERUS: Primary | ICD-10-CM

## 2019-04-22 PROBLEM — R01.1 HEART MURMUR: Status: ACTIVE | Noted: 2019-04-22

## 2019-04-22 LAB
ALBUMIN SERPL BCP-MCNC: 3.7 G/DL (ref 3.5–5)
ALP SERPL-CCNC: 43 U/L (ref 46–116)
ALT SERPL W P-5'-P-CCNC: 31 U/L (ref 12–78)
ANION GAP SERPL CALCULATED.3IONS-SCNC: 4 MMOL/L (ref 4–13)
AST SERPL W P-5'-P-CCNC: 17 U/L (ref 5–45)
BILIRUB SERPL-MCNC: 0.69 MG/DL (ref 0.2–1)
BUN SERPL-MCNC: 15 MG/DL (ref 5–25)
CALCIUM SERPL-MCNC: 8.6 MG/DL (ref 8.3–10.1)
CHLORIDE SERPL-SCNC: 105 MMOL/L (ref 100–108)
CO2 SERPL-SCNC: 27 MMOL/L (ref 21–32)
CREAT SERPL-MCNC: 0.82 MG/DL (ref 0.6–1.3)
ERYTHROCYTE [DISTWIDTH] IN BLOOD BY AUTOMATED COUNT: 15.6 % (ref 11.6–15.1)
GFR SERPL CREATININE-BSD FRML MDRD: 85 ML/MIN/1.73SQ M
GLUCOSE P FAST SERPL-MCNC: 99 MG/DL (ref 65–99)
GLUCOSE SERPL-MCNC: 99 MG/DL (ref 65–140)
HCT VFR BLD AUTO: 36.5 % (ref 34.8–46.1)
HGB BLD-MCNC: 11.2 G/DL (ref 11.5–15.4)
INR PPP: 0.95 (ref 0.86–1.17)
MCH RBC QN AUTO: 25.2 PG (ref 26.8–34.3)
MCHC RBC AUTO-ENTMCNC: 30.7 G/DL (ref 31.4–37.4)
MCV RBC AUTO: 82 FL (ref 82–98)
PLATELET # BLD AUTO: 194 THOUSANDS/UL (ref 149–390)
PMV BLD AUTO: 10.7 FL (ref 8.9–12.7)
POTASSIUM SERPL-SCNC: 3.5 MMOL/L (ref 3.5–5.3)
PROT SERPL-MCNC: 7.3 G/DL (ref 6.4–8.2)
PROTHROMBIN TIME: 12.8 SECONDS (ref 11.8–14.2)
RBC # BLD AUTO: 4.44 MILLION/UL (ref 3.81–5.12)
SODIUM SERPL-SCNC: 136 MMOL/L (ref 136–145)
WBC # BLD AUTO: 4.9 THOUSAND/UL (ref 4.31–10.16)

## 2019-04-22 PROCEDURE — 99152 MOD SED SAME PHYS/QHP 5/>YRS: CPT | Performed by: RADIOLOGY

## 2019-04-22 PROCEDURE — 85027 COMPLETE CBC AUTOMATED: CPT | Performed by: RADIOLOGY

## 2019-04-22 PROCEDURE — C1887 CATHETER, GUIDING: HCPCS

## 2019-04-22 PROCEDURE — 36247 INS CATH ABD/L-EXT ART 3RD: CPT | Performed by: RADIOLOGY

## 2019-04-22 PROCEDURE — 99152 MOD SED SAME PHYS/QHP 5/>YRS: CPT

## 2019-04-22 PROCEDURE — C1760 CLOSURE DEV, VASC: HCPCS

## 2019-04-22 PROCEDURE — 99153 MOD SED SAME PHYS/QHP EA: CPT

## 2019-04-22 PROCEDURE — C1894 INTRO/SHEATH, NON-LASER: HCPCS

## 2019-04-22 PROCEDURE — 37243 VASC EMBOLIZE/OCCLUDE ORGAN: CPT

## 2019-04-22 PROCEDURE — 94760 N-INVAS EAR/PLS OXIMETRY 1: CPT

## 2019-04-22 PROCEDURE — 85610 PROTHROMBIN TIME: CPT | Performed by: RADIOLOGY

## 2019-04-22 PROCEDURE — 80053 COMPREHEN METABOLIC PANEL: CPT | Performed by: RADIOLOGY

## 2019-04-22 PROCEDURE — 76937 US GUIDE VASCULAR ACCESS: CPT

## 2019-04-22 PROCEDURE — C1769 GUIDE WIRE: HCPCS

## 2019-04-22 PROCEDURE — NC001 PR NO CHARGE: Performed by: RADIOLOGY

## 2019-04-22 PROCEDURE — 37243 VASC EMBOLIZE/OCCLUDE ORGAN: CPT | Performed by: RADIOLOGY

## 2019-04-22 PROCEDURE — 94762 N-INVAS EAR/PLS OXIMTRY CONT: CPT

## 2019-04-22 PROCEDURE — 99219 PR INITIAL OBSERVATION CARE/DAY 50 MINUTES: CPT | Performed by: INTERNAL MEDICINE

## 2019-04-22 RX ORDER — DIPHENHYDRAMINE HYDROCHLORIDE 50 MG/ML
INJECTION INTRAMUSCULAR; INTRAVENOUS CODE/TRAUMA/SEDATION MEDICATION
Status: DISCONTINUED | OUTPATIENT
Start: 2019-04-22 | End: 2019-04-23 | Stop reason: HOSPADM

## 2019-04-22 RX ORDER — HYDROMORPHONE HCL/PF 1 MG/ML
1 SYRINGE (ML) INJECTION
Status: DISCONTINUED | OUTPATIENT
Start: 2019-04-22 | End: 2019-04-23 | Stop reason: HOSPADM

## 2019-04-22 RX ORDER — OXYCODONE HYDROCHLORIDE AND ACETAMINOPHEN 5; 325 MG/1; MG/1
1 TABLET ORAL EVERY 6 HOURS PRN
Status: DISCONTINUED | OUTPATIENT
Start: 2019-04-22 | End: 2019-04-23 | Stop reason: HOSPADM

## 2019-04-22 RX ORDER — OXYCODONE HYDROCHLORIDE AND ACETAMINOPHEN 5; 325 MG/1; MG/1
1 TABLET ORAL 2 TIMES DAILY
Status: DISCONTINUED | OUTPATIENT
Start: 2019-04-22 | End: 2019-04-23 | Stop reason: HOSPADM

## 2019-04-22 RX ORDER — NAPROXEN 500 MG/1
500 TABLET ORAL 2 TIMES DAILY WITH MEALS
Qty: 14 TABLET | Refills: 0 | Status: SHIPPED | OUTPATIENT
Start: 2019-04-22 | End: 2019-06-12

## 2019-04-22 RX ORDER — ONDANSETRON 2 MG/ML
4 INJECTION INTRAMUSCULAR; INTRAVENOUS EVERY 6 HOURS PRN
Status: DISCONTINUED | OUTPATIENT
Start: 2019-04-22 | End: 2019-04-23 | Stop reason: ALTCHOICE

## 2019-04-22 RX ORDER — DEXAMETHASONE SODIUM PHOSPHATE 10 MG/ML
10 INJECTION, SOLUTION INTRAMUSCULAR; INTRAVENOUS ONCE
Status: DISCONTINUED | OUTPATIENT
Start: 2019-04-22 | End: 2019-04-23 | Stop reason: HOSPADM

## 2019-04-22 RX ORDER — CEFAZOLIN SODIUM 2 G/50ML
2000 SOLUTION INTRAVENOUS ONCE
Status: COMPLETED | OUTPATIENT
Start: 2019-04-22 | End: 2019-04-22

## 2019-04-22 RX ORDER — ACETAMINOPHEN 325 MG/1
650 TABLET ORAL EVERY 6 HOURS PRN
Status: DISCONTINUED | OUTPATIENT
Start: 2019-04-22 | End: 2019-04-23 | Stop reason: HOSPADM

## 2019-04-22 RX ORDER — NAPROXEN 500 MG/1
500 TABLET ORAL ONCE
Status: DISCONTINUED | OUTPATIENT
Start: 2019-04-22 | End: 2019-04-22 | Stop reason: SDUPTHER

## 2019-04-22 RX ORDER — AZELASTINE 1 MG/ML
2 SPRAY, METERED NASAL 2 TIMES DAILY
Status: DISCONTINUED | OUTPATIENT
Start: 2019-04-22 | End: 2019-04-23 | Stop reason: HOSPADM

## 2019-04-22 RX ORDER — CEPHALEXIN 500 MG/1
500 CAPSULE ORAL EVERY 12 HOURS SCHEDULED
Qty: 10 CAPSULE | Refills: 0 | Status: SHIPPED | OUTPATIENT
Start: 2019-04-22 | End: 2019-04-27

## 2019-04-22 RX ORDER — KETOROLAC TROMETHAMINE 30 MG/ML
30 INJECTION, SOLUTION INTRAMUSCULAR; INTRAVENOUS EVERY 6 HOURS
Status: DISCONTINUED | OUTPATIENT
Start: 2019-04-22 | End: 2019-04-23 | Stop reason: HOSPADM

## 2019-04-22 RX ORDER — OXYCODONE HYDROCHLORIDE AND ACETAMINOPHEN 5; 325 MG/1; MG/1
TABLET ORAL CODE/TRAUMA/SEDATION MEDICATION
Status: DISCONTINUED | OUTPATIENT
Start: 2019-04-22 | End: 2019-04-23 | Stop reason: HOSPADM

## 2019-04-22 RX ORDER — KETOROLAC TROMETHAMINE 30 MG/ML
30 INJECTION, SOLUTION INTRAMUSCULAR; INTRAVENOUS 2 TIMES DAILY
Status: DISCONTINUED | OUTPATIENT
Start: 2019-04-22 | End: 2019-04-22

## 2019-04-22 RX ORDER — ONDANSETRON 4 MG/1
4 TABLET, FILM COATED ORAL EVERY 8 HOURS PRN
Qty: 20 TABLET | Refills: 0 | Status: SHIPPED | OUTPATIENT
Start: 2019-04-22 | End: 2019-06-12

## 2019-04-22 RX ORDER — MIDAZOLAM HYDROCHLORIDE 1 MG/ML
INJECTION INTRAMUSCULAR; INTRAVENOUS CODE/TRAUMA/SEDATION MEDICATION
Status: DISCONTINUED | OUTPATIENT
Start: 2019-04-22 | End: 2019-04-23 | Stop reason: HOSPADM

## 2019-04-22 RX ORDER — ONDANSETRON 2 MG/ML
4 INJECTION INTRAMUSCULAR; INTRAVENOUS EVERY 6 HOURS PRN
Status: DISCONTINUED | OUTPATIENT
Start: 2019-04-22 | End: 2019-04-23 | Stop reason: HOSPADM

## 2019-04-22 RX ORDER — FENTANYL CITRATE 50 UG/ML
INJECTION, SOLUTION INTRAMUSCULAR; INTRAVENOUS CODE/TRAUMA/SEDATION MEDICATION
Status: DISCONTINUED | OUTPATIENT
Start: 2019-04-22 | End: 2019-04-23 | Stop reason: HOSPADM

## 2019-04-22 RX ORDER — HYDROMORPHONE HYDROCHLORIDE 4 MG/ML
INJECTION, SOLUTION INTRAMUSCULAR; INTRAVENOUS; SUBCUTANEOUS CODE/TRAUMA/SEDATION MEDICATION
Status: DISCONTINUED | OUTPATIENT
Start: 2019-04-22 | End: 2019-04-23 | Stop reason: HOSPADM

## 2019-04-22 RX ORDER — OXYCODONE HYDROCHLORIDE AND ACETAMINOPHEN 5; 325 MG/1; MG/1
1 TABLET ORAL ONCE
Status: COMPLETED | OUTPATIENT
Start: 2019-04-22 | End: 2019-04-22

## 2019-04-22 RX ORDER — KETOROLAC TROMETHAMINE 30 MG/ML
INJECTION, SOLUTION INTRAMUSCULAR; INTRAVENOUS CODE/TRAUMA/SEDATION MEDICATION
Status: DISCONTINUED | OUTPATIENT
Start: 2019-04-22 | End: 2019-04-23 | Stop reason: HOSPADM

## 2019-04-22 RX ORDER — HYDROCHLOROTHIAZIDE 25 MG/1
25 TABLET ORAL EVERY MORNING
Status: DISCONTINUED | OUTPATIENT
Start: 2019-04-23 | End: 2019-04-23 | Stop reason: HOSPADM

## 2019-04-22 RX ORDER — ONDANSETRON 2 MG/ML
INJECTION INTRAMUSCULAR; INTRAVENOUS CODE/TRAUMA/SEDATION MEDICATION
Status: DISCONTINUED | OUTPATIENT
Start: 2019-04-22 | End: 2019-04-23 | Stop reason: HOSPADM

## 2019-04-22 RX ORDER — DEXAMETHASONE SODIUM PHOSPHATE 4 MG/ML
INJECTION, SOLUTION INTRA-ARTICULAR; INTRALESIONAL; INTRAMUSCULAR; INTRAVENOUS; SOFT TISSUE CODE/TRAUMA/SEDATION MEDICATION
Status: DISCONTINUED | OUTPATIENT
Start: 2019-04-22 | End: 2019-04-23 | Stop reason: HOSPADM

## 2019-04-22 RX ORDER — NAPROXEN 500 MG/1
500 TABLET ORAL ONCE
Status: COMPLETED | OUTPATIENT
Start: 2019-04-22 | End: 2019-04-22

## 2019-04-22 RX ORDER — LEVOTHYROXINE SODIUM 0.07 MG/1
150 TABLET ORAL
Status: DISCONTINUED | OUTPATIENT
Start: 2019-04-23 | End: 2019-04-23 | Stop reason: HOSPADM

## 2019-04-22 RX ORDER — SODIUM CHLORIDE 9 MG/ML
20 INJECTION, SOLUTION INTRAVENOUS CONTINUOUS
Status: DISCONTINUED | OUTPATIENT
Start: 2019-04-22 | End: 2019-04-23 | Stop reason: HOSPADM

## 2019-04-22 RX ADMIN — MIDAZOLAM 0.5 MG: 1 INJECTION INTRAMUSCULAR; INTRAVENOUS at 10:28

## 2019-04-22 RX ADMIN — FENTANYL CITRATE 25 MCG: 50 INJECTION, SOLUTION INTRAMUSCULAR; INTRAVENOUS at 10:38

## 2019-04-22 RX ADMIN — FENTANYL CITRATE 50 MCG: 50 INJECTION, SOLUTION INTRAMUSCULAR; INTRAVENOUS at 10:54

## 2019-04-22 RX ADMIN — KETOROLAC TROMETHAMINE 30 MG: 30 INJECTION, SOLUTION INTRAMUSCULAR at 23:09

## 2019-04-22 RX ADMIN — HYDROMORPHONE HYDROCHLORIDE: 10 INJECTION INTRAMUSCULAR; INTRAVENOUS; SUBCUTANEOUS at 13:04

## 2019-04-22 RX ADMIN — OXYCODONE AND ACETAMINOPHEN 1 TABLET: 5; 325 TABLET ORAL at 11:35

## 2019-04-22 RX ADMIN — MIDAZOLAM 0.5 MG: 1 INJECTION INTRAMUSCULAR; INTRAVENOUS at 08:56

## 2019-04-22 RX ADMIN — FENTANYL CITRATE 50 MCG: 50 INJECTION, SOLUTION INTRAMUSCULAR; INTRAVENOUS at 11:13

## 2019-04-22 RX ADMIN — DEXAMETHASONE SODIUM PHOSPHATE 10 MG: 4 INJECTION, SOLUTION INTRAMUSCULAR; INTRAVENOUS at 10:32

## 2019-04-22 RX ADMIN — IODIXANOL 66 ML: 320 INJECTION, SOLUTION INTRAVASCULAR at 11:29

## 2019-04-22 RX ADMIN — SODIUM CHLORIDE 75 ML/HR: 0.9 INJECTION, SOLUTION INTRAVENOUS at 07:40

## 2019-04-22 RX ADMIN — KETOROLAC TROMETHAMINE 30 MG: 30 INJECTION, SOLUTION INTRAMUSCULAR at 09:38

## 2019-04-22 RX ADMIN — FENTANYL CITRATE 25 MCG: 50 INJECTION, SOLUTION INTRAMUSCULAR; INTRAVENOUS at 09:34

## 2019-04-22 RX ADMIN — MIDAZOLAM 0.5 MG: 1 INJECTION INTRAMUSCULAR; INTRAVENOUS at 09:54

## 2019-04-22 RX ADMIN — HYDROMORPHONE HYDROCHLORIDE 1 MG: 1 INJECTION, SOLUTION INTRAMUSCULAR; INTRAVENOUS; SUBCUTANEOUS at 12:46

## 2019-04-22 RX ADMIN — FENTANYL CITRATE 25 MCG: 50 INJECTION, SOLUTION INTRAMUSCULAR; INTRAVENOUS at 10:12

## 2019-04-22 RX ADMIN — MIDAZOLAM 1 MG: 1 INJECTION INTRAMUSCULAR; INTRAVENOUS at 10:54

## 2019-04-22 RX ADMIN — MIDAZOLAM 1 MG: 1 INJECTION INTRAMUSCULAR; INTRAVENOUS at 08:33

## 2019-04-22 RX ADMIN — KETOROLAC TROMETHAMINE 30 MG: 30 INJECTION, SOLUTION INTRAMUSCULAR at 17:32

## 2019-04-22 RX ADMIN — OXYCODONE HYDROCHLORIDE AND ACETAMINOPHEN 1 TABLET: 5; 325 TABLET ORAL at 17:35

## 2019-04-22 RX ADMIN — HYDROMORPHONE HYDROCHLORIDE 1 MG: 4 INJECTION, SOLUTION INTRAMUSCULAR; INTRAVENOUS; SUBCUTANEOUS at 11:26

## 2019-04-22 RX ADMIN — MIDAZOLAM 1 MG: 1 INJECTION INTRAMUSCULAR; INTRAVENOUS at 09:06

## 2019-04-22 RX ADMIN — ONDANSETRON 4 MG: 2 INJECTION INTRAMUSCULAR; INTRAVENOUS at 09:42

## 2019-04-22 RX ADMIN — FENTANYL CITRATE 50 MCG: 50 INJECTION, SOLUTION INTRAMUSCULAR; INTRAVENOUS at 09:06

## 2019-04-22 RX ADMIN — MIDAZOLAM 1 MG: 1 INJECTION INTRAMUSCULAR; INTRAVENOUS at 08:45

## 2019-04-22 RX ADMIN — DEXAMETHASONE SODIUM PHOSPHATE 10 MG: 4 INJECTION, SOLUTION INTRAMUSCULAR; INTRAVENOUS at 09:41

## 2019-04-22 RX ADMIN — KETOROLAC TROMETHAMINE 30 MG: 30 INJECTION, SOLUTION INTRAMUSCULAR at 10:32

## 2019-04-22 RX ADMIN — OXYCODONE HYDROCHLORIDE AND ACETAMINOPHEN 1 TABLET: 5; 325 TABLET ORAL at 07:50

## 2019-04-22 RX ADMIN — NAPROXEN 500 MG: 500 TABLET ORAL at 07:49

## 2019-04-22 RX ADMIN — DIPHENHYDRAMINE HYDROCHLORIDE 50 MG: 50 INJECTION, SOLUTION INTRAMUSCULAR; INTRAVENOUS at 09:18

## 2019-04-22 RX ADMIN — CEFAZOLIN SODIUM 2000 MG: 2 SOLUTION INTRAVENOUS at 09:00

## 2019-04-22 RX ADMIN — MIDAZOLAM 0.5 MG: 1 INJECTION INTRAMUSCULAR; INTRAVENOUS at 11:14

## 2019-04-23 ENCOUNTER — TRANSITIONAL CARE MANAGEMENT (OUTPATIENT)
Dept: FAMILY MEDICINE CLINIC | Facility: CLINIC | Age: 49
End: 2019-04-23

## 2019-04-23 VITALS
DIASTOLIC BLOOD PRESSURE: 57 MMHG | TEMPERATURE: 98.1 F | HEART RATE: 67 BPM | SYSTOLIC BLOOD PRESSURE: 120 MMHG | HEIGHT: 66 IN | OXYGEN SATURATION: 94 % | RESPIRATION RATE: 18 BRPM | BODY MASS INDEX: 32.14 KG/M2 | WEIGHT: 200 LBS

## 2019-04-23 DIAGNOSIS — D25.1 INTRAMURAL, SUBMUCOUS, AND SUBSEROUS LEIOMYOMA OF UTERUS: Primary | ICD-10-CM

## 2019-04-23 DIAGNOSIS — D25.2 INTRAMURAL, SUBMUCOUS, AND SUBSEROUS LEIOMYOMA OF UTERUS: Primary | ICD-10-CM

## 2019-04-23 DIAGNOSIS — D25.0 INTRAMURAL, SUBMUCOUS, AND SUBSEROUS LEIOMYOMA OF UTERUS: Primary | ICD-10-CM

## 2019-04-23 LAB
ALBUMIN SERPL BCP-MCNC: 3.2 G/DL (ref 3.5–5)
ALP SERPL-CCNC: 39 U/L (ref 46–116)
ALT SERPL W P-5'-P-CCNC: 52 U/L (ref 12–78)
ANION GAP SERPL CALCULATED.3IONS-SCNC: 7 MMOL/L (ref 4–13)
AST SERPL W P-5'-P-CCNC: 40 U/L (ref 5–45)
BILIRUB SERPL-MCNC: 0.53 MG/DL (ref 0.2–1)
BUN SERPL-MCNC: 10 MG/DL (ref 5–25)
CALCIUM SERPL-MCNC: 7.8 MG/DL (ref 8.3–10.1)
CHLORIDE SERPL-SCNC: 105 MMOL/L (ref 100–108)
CO2 SERPL-SCNC: 24 MMOL/L (ref 21–32)
CREAT SERPL-MCNC: 0.82 MG/DL (ref 0.6–1.3)
ERYTHROCYTE [DISTWIDTH] IN BLOOD BY AUTOMATED COUNT: 15.4 % (ref 11.6–15.1)
GFR SERPL CREATININE-BSD FRML MDRD: 85 ML/MIN/1.73SQ M
GLUCOSE SERPL-MCNC: 116 MG/DL (ref 65–140)
HCT VFR BLD AUTO: 34.6 % (ref 34.8–46.1)
HGB BLD-MCNC: 11 G/DL (ref 11.5–15.4)
MCH RBC QN AUTO: 25.8 PG (ref 26.8–34.3)
MCHC RBC AUTO-ENTMCNC: 31.8 G/DL (ref 31.4–37.4)
MCV RBC AUTO: 81 FL (ref 82–98)
PLATELET # BLD AUTO: 219 THOUSANDS/UL (ref 149–390)
PMV BLD AUTO: 11 FL (ref 8.9–12.7)
POTASSIUM SERPL-SCNC: 3.4 MMOL/L (ref 3.5–5.3)
PROT SERPL-MCNC: 6.4 G/DL (ref 6.4–8.2)
RBC # BLD AUTO: 4.26 MILLION/UL (ref 3.81–5.12)
SODIUM SERPL-SCNC: 136 MMOL/L (ref 136–145)
WBC # BLD AUTO: 9.95 THOUSAND/UL (ref 4.31–10.16)

## 2019-04-23 PROCEDURE — 99217 PR OBSERVATION CARE DISCHARGE MANAGEMENT: CPT | Performed by: INTERNAL MEDICINE

## 2019-04-23 PROCEDURE — 85027 COMPLETE CBC AUTOMATED: CPT | Performed by: INTERNAL MEDICINE

## 2019-04-23 PROCEDURE — 80053 COMPREHEN METABOLIC PANEL: CPT | Performed by: INTERNAL MEDICINE

## 2019-04-23 PROCEDURE — 94760 N-INVAS EAR/PLS OXIMETRY 1: CPT

## 2019-04-23 PROCEDURE — 99213 OFFICE O/P EST LOW 20 MIN: CPT | Performed by: RADIOLOGY

## 2019-04-23 RX ORDER — OXYCODONE HYDROCHLORIDE AND ACETAMINOPHEN 5; 325 MG/1; MG/1
1 TABLET ORAL 2 TIMES DAILY
Qty: 20 TABLET | Refills: 0 | Status: SHIPPED | OUTPATIENT
Start: 2019-04-23 | End: 2019-05-01 | Stop reason: SDUPTHER

## 2019-04-23 RX ORDER — POTASSIUM CHLORIDE 20 MEQ/1
40 TABLET, EXTENDED RELEASE ORAL ONCE
Status: COMPLETED | OUTPATIENT
Start: 2019-04-23 | End: 2019-04-23

## 2019-04-23 RX ORDER — OXYCODONE HYDROCHLORIDE AND ACETAMINOPHEN 5; 325 MG/1; MG/1
1 TABLET ORAL EVERY 6 HOURS PRN
Qty: 15 TABLET | Refills: 0 | Status: SHIPPED | OUTPATIENT
Start: 2019-04-23 | End: 2019-06-12

## 2019-04-23 RX ADMIN — POTASSIUM CHLORIDE 40 MEQ: 1500 TABLET, EXTENDED RELEASE ORAL at 10:41

## 2019-04-23 RX ADMIN — KETOROLAC TROMETHAMINE 30 MG: 30 INJECTION, SOLUTION INTRAMUSCULAR at 06:47

## 2019-04-23 RX ADMIN — LEVOTHYROXINE SODIUM 150 MCG: 75 TABLET ORAL at 06:47

## 2019-04-23 RX ADMIN — ONDANSETRON 4 MG: 2 INJECTION INTRAMUSCULAR; INTRAVENOUS at 00:23

## 2019-04-23 RX ADMIN — HYDROCHLOROTHIAZIDE 25 MG: 25 TABLET ORAL at 08:39

## 2019-04-23 RX ADMIN — OXYCODONE AND ACETAMINOPHEN 1 TABLET: 5; 325 TABLET ORAL at 08:39

## 2019-04-24 ENCOUNTER — TELEPHONE (OUTPATIENT)
Dept: RADIOLOGY | Facility: HOSPITAL | Age: 49
End: 2019-04-24

## 2019-04-26 ENCOUNTER — HOSPITAL ENCOUNTER (OUTPATIENT)
Facility: HOSPITAL | Age: 49
Setting detail: OBSERVATION
Discharge: HOME | End: 2019-04-27
Attending: EMERGENCY MEDICINE | Admitting: INTERNAL MEDICINE
Payer: COMMERCIAL

## 2019-04-26 ENCOUNTER — APPOINTMENT (EMERGENCY)
Dept: CT IMAGING | Facility: HOSPITAL | Age: 49
End: 2019-04-26
Payer: COMMERCIAL

## 2019-04-26 DIAGNOSIS — R10.2 PELVIC PAIN: ICD-10-CM

## 2019-04-26 DIAGNOSIS — G89.18 POST-OPERATIVE PAIN: Primary | ICD-10-CM

## 2019-04-26 PROBLEM — D21.9 LEIOMYOMA: Status: ACTIVE | Noted: 2019-04-26

## 2019-04-26 LAB
ANION GAP SERPL CALCULATED.3IONS-SCNC: 11 MMOL/L (ref 4–13)
BACTERIA UR QL AUTO: ABNORMAL /HPF
BASOPHILS # BLD AUTO: 0.04 THOUSANDS/ΜL (ref 0–0.1)
BASOPHILS NFR BLD AUTO: 1 % (ref 0–1)
BILIRUB UR QL STRIP: NEGATIVE
BUN SERPL-MCNC: 19 MG/DL (ref 5–25)
CALCIUM SERPL-MCNC: 9.6 MG/DL (ref 8.3–10.1)
CHLORIDE SERPL-SCNC: 102 MMOL/L (ref 100–108)
CLARITY UR: ABNORMAL
CO2 SERPL-SCNC: 24 MMOL/L (ref 21–32)
COLOR UR: YELLOW
CREAT SERPL-MCNC: 0.8 MG/DL (ref 0.6–1.3)
EOSINOPHIL # BLD AUTO: 0.11 THOUSAND/ΜL (ref 0–0.61)
EOSINOPHIL NFR BLD AUTO: 1 % (ref 0–6)
ERYTHROCYTE [DISTWIDTH] IN BLOOD BY AUTOMATED COUNT: 15.7 % (ref 11.6–15.1)
GFR SERPL CREATININE-BSD FRML MDRD: 88 ML/MIN/1.73SQ M
GLUCOSE SERPL-MCNC: 113 MG/DL (ref 65–140)
GLUCOSE UR STRIP-MCNC: NEGATIVE MG/DL
HCG SERPL QL: NEGATIVE
HCT VFR BLD AUTO: 39.6 % (ref 34.8–46.1)
HGB BLD-MCNC: 12.3 G/DL (ref 11.5–15.4)
HGB UR QL STRIP.AUTO: ABNORMAL
IMM GRANULOCYTES # BLD AUTO: 0.03 THOUSAND/UL (ref 0–0.2)
IMM GRANULOCYTES NFR BLD AUTO: 0 % (ref 0–2)
KETONES UR STRIP-MCNC: ABNORMAL MG/DL
LEUKOCYTE ESTERASE UR QL STRIP: NEGATIVE
LYMPHOCYTES # BLD AUTO: 1.48 THOUSANDS/ΜL (ref 0.6–4.47)
LYMPHOCYTES NFR BLD AUTO: 18 % (ref 14–44)
MCH RBC QN AUTO: 24.9 PG (ref 26.8–34.3)
MCHC RBC AUTO-ENTMCNC: 31.1 G/DL (ref 31.4–37.4)
MCV RBC AUTO: 80 FL (ref 82–98)
MONOCYTES # BLD AUTO: 0.44 THOUSAND/ΜL (ref 0.17–1.22)
MONOCYTES NFR BLD AUTO: 5 % (ref 4–12)
MUCOUS THREADS UR QL AUTO: ABNORMAL
NEUTROPHILS # BLD AUTO: 6.19 THOUSANDS/ΜL (ref 1.85–7.62)
NEUTS SEG NFR BLD AUTO: 75 % (ref 43–75)
NITRITE UR QL STRIP: NEGATIVE
NON-SQ EPI CELLS URNS QL MICRO: ABNORMAL /HPF
NRBC BLD AUTO-RTO: 0 /100 WBCS
PH UR STRIP.AUTO: 7 [PH]
PLATELET # BLD AUTO: 223 THOUSANDS/UL (ref 149–390)
PMV BLD AUTO: 11 FL (ref 8.9–12.7)
POTASSIUM SERPL-SCNC: 3.8 MMOL/L (ref 3.5–5.3)
PROT UR STRIP-MCNC: ABNORMAL MG/DL
RBC # BLD AUTO: 4.93 MILLION/UL (ref 3.81–5.12)
RBC #/AREA URNS AUTO: ABNORMAL /HPF
SODIUM SERPL-SCNC: 137 MMOL/L (ref 136–145)
SP GR UR STRIP.AUTO: 1.02 (ref 1–1.03)
UROBILINOGEN UR QL STRIP.AUTO: 1 E.U./DL
WBC # BLD AUTO: 8.29 THOUSAND/UL (ref 4.31–10.16)
WBC #/AREA URNS AUTO: ABNORMAL /HPF

## 2019-04-26 PROCEDURE — 99220 PR INITIAL OBSERVATION CARE/DAY 70 MINUTES: CPT | Performed by: STUDENT IN AN ORGANIZED HEALTH CARE EDUCATION/TRAINING PROGRAM

## 2019-04-26 PROCEDURE — 85025 COMPLETE CBC W/AUTO DIFF WBC: CPT | Performed by: EMERGENCY MEDICINE

## 2019-04-26 PROCEDURE — 99285 EMERGENCY DEPT VISIT HI MDM: CPT

## 2019-04-26 PROCEDURE — 99285 EMERGENCY DEPT VISIT HI MDM: CPT | Performed by: EMERGENCY MEDICINE

## 2019-04-26 PROCEDURE — 81001 URINALYSIS AUTO W/SCOPE: CPT | Performed by: EMERGENCY MEDICINE

## 2019-04-26 PROCEDURE — 96375 TX/PRO/DX INJ NEW DRUG ADDON: CPT

## 2019-04-26 PROCEDURE — 96374 THER/PROPH/DIAG INJ IV PUSH: CPT

## 2019-04-26 PROCEDURE — 74177 CT ABD & PELVIS W/CONTRAST: CPT

## 2019-04-26 PROCEDURE — 36415 COLL VENOUS BLD VENIPUNCTURE: CPT | Performed by: EMERGENCY MEDICINE

## 2019-04-26 PROCEDURE — 84703 CHORIONIC GONADOTROPIN ASSAY: CPT | Performed by: STUDENT IN AN ORGANIZED HEALTH CARE EDUCATION/TRAINING PROGRAM

## 2019-04-26 PROCEDURE — 80048 BASIC METABOLIC PNL TOTAL CA: CPT | Performed by: EMERGENCY MEDICINE

## 2019-04-26 RX ORDER — HYDROCHLOROTHIAZIDE 25 MG/1
25 TABLET ORAL EVERY MORNING
Status: DISCONTINUED | OUTPATIENT
Start: 2019-04-27 | End: 2019-04-27 | Stop reason: HOSPADM

## 2019-04-26 RX ORDER — TRAMADOL HYDROCHLORIDE 50 MG/1
50 TABLET ORAL ONCE
Status: COMPLETED | OUTPATIENT
Start: 2019-04-26 | End: 2019-04-26

## 2019-04-26 RX ORDER — KETOROLAC TROMETHAMINE 10 MG/1
10 TABLET, FILM COATED ORAL ONCE
Status: COMPLETED | OUTPATIENT
Start: 2019-04-26 | End: 2019-04-26

## 2019-04-26 RX ORDER — OXYCODONE HYDROCHLORIDE AND ACETAMINOPHEN 5; 325 MG/1; MG/1
1 TABLET ORAL EVERY 8 HOURS PRN
Status: DISCONTINUED | OUTPATIENT
Start: 2019-04-26 | End: 2019-04-27 | Stop reason: HOSPADM

## 2019-04-26 RX ORDER — OXYCODONE HYDROCHLORIDE AND ACETAMINOPHEN 5; 325 MG/1; MG/1
1 TABLET ORAL EVERY 8 HOURS PRN
Status: DISCONTINUED | OUTPATIENT
Start: 2019-04-26 | End: 2019-04-26

## 2019-04-26 RX ORDER — LEVOTHYROXINE SODIUM 0.15 MG/1
150 TABLET ORAL DAILY
Status: DISCONTINUED | OUTPATIENT
Start: 2019-04-27 | End: 2019-04-27 | Stop reason: SDUPTHER

## 2019-04-26 RX ORDER — KETOROLAC TROMETHAMINE 10 MG/1
10 TABLET, FILM COATED ORAL EVERY 8 HOURS PRN
Status: DISCONTINUED | OUTPATIENT
Start: 2019-04-26 | End: 2019-04-27 | Stop reason: HOSPADM

## 2019-04-26 RX ORDER — CEPHALEXIN 500 MG/1
500 CAPSULE ORAL EVERY 12 HOURS SCHEDULED
Status: COMPLETED | OUTPATIENT
Start: 2019-04-26 | End: 2019-04-27

## 2019-04-26 RX ORDER — NAPROXEN 250 MG/1
250 TABLET ORAL 2 TIMES DAILY WITH MEALS
Status: DISCONTINUED | OUTPATIENT
Start: 2019-04-26 | End: 2019-04-26

## 2019-04-26 RX ORDER — OXYCODONE HYDROCHLORIDE AND ACETAMINOPHEN 5; 325 MG/1; MG/1
1 TABLET ORAL EVERY 6 HOURS PRN
Status: DISCONTINUED | OUTPATIENT
Start: 2019-04-26 | End: 2019-04-26

## 2019-04-26 RX ORDER — OXYCODONE HYDROCHLORIDE AND ACETAMINOPHEN 5; 325 MG/1; MG/1
2 TABLET ORAL ONCE
Status: COMPLETED | OUTPATIENT
Start: 2019-04-26 | End: 2019-04-26

## 2019-04-26 RX ORDER — KETOROLAC TROMETHAMINE 30 MG/ML
15 INJECTION, SOLUTION INTRAMUSCULAR; INTRAVENOUS ONCE
Status: COMPLETED | OUTPATIENT
Start: 2019-04-26 | End: 2019-04-26

## 2019-04-26 RX ADMIN — KETOROLAC TROMETHAMINE 10 MG: 10 TABLET, FILM COATED ORAL at 18:34

## 2019-04-26 RX ADMIN — KETOROLAC TROMETHAMINE 15 MG: 30 INJECTION, SOLUTION INTRAMUSCULAR at 07:43

## 2019-04-26 RX ADMIN — OXYCODONE HYDROCHLORIDE AND ACETAMINOPHEN 1 TABLET: 5; 325 TABLET ORAL at 15:46

## 2019-04-26 RX ADMIN — MORPHINE SULFATE 2 MG: 2 INJECTION, SOLUTION INTRAMUSCULAR; INTRAVENOUS at 19:48

## 2019-04-26 RX ADMIN — TRAMADOL HYDROCHLORIDE 50 MG: 50 TABLET, COATED ORAL at 09:16

## 2019-04-26 RX ADMIN — ENOXAPARIN SODIUM 40 MG: 40 INJECTION SUBCUTANEOUS at 18:35

## 2019-04-26 RX ADMIN — OXYCODONE HYDROCHLORIDE AND ACETAMINOPHEN 2 TABLET: 5; 325 TABLET ORAL at 21:05

## 2019-04-26 RX ADMIN — MORPHINE SULFATE 2 MG: 2 INJECTION, SOLUTION INTRAMUSCULAR; INTRAVENOUS at 08:21

## 2019-04-26 RX ADMIN — IOHEXOL 100 ML: 350 INJECTION, SOLUTION INTRAVENOUS at 08:33

## 2019-04-26 RX ADMIN — CEPHALEXIN 500 MG: 500 CAPSULE ORAL at 21:05

## 2019-04-26 RX ADMIN — KETOROLAC TROMETHAMINE 10 MG: 10 TABLET, FILM COATED ORAL at 09:16

## 2019-04-27 VITALS
BODY MASS INDEX: 32.14 KG/M2 | WEIGHT: 200 LBS | HEART RATE: 71 BPM | DIASTOLIC BLOOD PRESSURE: 76 MMHG | RESPIRATION RATE: 18 BRPM | SYSTOLIC BLOOD PRESSURE: 135 MMHG | OXYGEN SATURATION: 96 % | TEMPERATURE: 98.1 F | HEIGHT: 66 IN

## 2019-04-27 LAB
ANION GAP SERPL CALCULATED.3IONS-SCNC: 8 MMOL/L (ref 4–13)
BASOPHILS # BLD AUTO: 0.04 THOUSANDS/ΜL (ref 0–0.1)
BASOPHILS NFR BLD AUTO: 1 % (ref 0–1)
BUN SERPL-MCNC: 18 MG/DL (ref 5–25)
CALCIUM SERPL-MCNC: 8.7 MG/DL (ref 8.3–10.1)
CHLORIDE SERPL-SCNC: 104 MMOL/L (ref 100–108)
CO2 SERPL-SCNC: 27 MMOL/L (ref 21–32)
CREAT SERPL-MCNC: 0.72 MG/DL (ref 0.6–1.3)
EOSINOPHIL # BLD AUTO: 0.14 THOUSAND/ΜL (ref 0–0.61)
EOSINOPHIL NFR BLD AUTO: 2 % (ref 0–6)
ERYTHROCYTE [DISTWIDTH] IN BLOOD BY AUTOMATED COUNT: 15.9 % (ref 11.6–15.1)
GFR SERPL CREATININE-BSD FRML MDRD: 100 ML/MIN/1.73SQ M
GLUCOSE P FAST SERPL-MCNC: 100 MG/DL (ref 65–99)
GLUCOSE SERPL-MCNC: 100 MG/DL (ref 65–140)
HCT VFR BLD AUTO: 36.2 % (ref 34.8–46.1)
HGB BLD-MCNC: 11.3 G/DL (ref 11.5–15.4)
IMM GRANULOCYTES # BLD AUTO: 0.03 THOUSAND/UL (ref 0–0.2)
IMM GRANULOCYTES NFR BLD AUTO: 0 % (ref 0–2)
LYMPHOCYTES # BLD AUTO: 1.99 THOUSANDS/ΜL (ref 0.6–4.47)
LYMPHOCYTES NFR BLD AUTO: 28 % (ref 14–44)
MCH RBC QN AUTO: 25.5 PG (ref 26.8–34.3)
MCHC RBC AUTO-ENTMCNC: 31.2 G/DL (ref 31.4–37.4)
MCV RBC AUTO: 82 FL (ref 82–98)
MONOCYTES # BLD AUTO: 0.55 THOUSAND/ΜL (ref 0.17–1.22)
MONOCYTES NFR BLD AUTO: 8 % (ref 4–12)
NEUTROPHILS # BLD AUTO: 4.25 THOUSANDS/ΜL (ref 1.85–7.62)
NEUTS SEG NFR BLD AUTO: 61 % (ref 43–75)
NRBC BLD AUTO-RTO: 0 /100 WBCS
PLATELET # BLD AUTO: 200 THOUSANDS/UL (ref 149–390)
PMV BLD AUTO: 10.2 FL (ref 8.9–12.7)
POTASSIUM SERPL-SCNC: 4 MMOL/L (ref 3.5–5.3)
RBC # BLD AUTO: 4.44 MILLION/UL (ref 3.81–5.12)
SODIUM SERPL-SCNC: 139 MMOL/L (ref 136–145)
WBC # BLD AUTO: 7 THOUSAND/UL (ref 4.31–10.16)

## 2019-04-27 PROCEDURE — 80048 BASIC METABOLIC PNL TOTAL CA: CPT | Performed by: STUDENT IN AN ORGANIZED HEALTH CARE EDUCATION/TRAINING PROGRAM

## 2019-04-27 PROCEDURE — 85025 COMPLETE CBC W/AUTO DIFF WBC: CPT | Performed by: STUDENT IN AN ORGANIZED HEALTH CARE EDUCATION/TRAINING PROGRAM

## 2019-04-27 PROCEDURE — 99217 PR OBSERVATION CARE DISCHARGE MANAGEMENT: CPT | Performed by: NURSE PRACTITIONER

## 2019-04-27 RX ORDER — LEVOTHYROXINE SODIUM 0.15 MG/1
150 TABLET ORAL
Status: DISCONTINUED | OUTPATIENT
Start: 2019-04-27 | End: 2019-04-27 | Stop reason: HOSPADM

## 2019-04-27 RX ORDER — HYDROCHLOROTHIAZIDE 25 MG/1
25 TABLET ORAL DAILY
Status: DISCONTINUED | OUTPATIENT
Start: 2019-04-27 | End: 2019-04-27 | Stop reason: SDUPTHER

## 2019-04-27 RX ADMIN — ENOXAPARIN SODIUM 40 MG: 40 INJECTION SUBCUTANEOUS at 09:14

## 2019-04-27 RX ADMIN — HYDROCHLOROTHIAZIDE 25 MG: 25 TABLET ORAL at 09:14

## 2019-04-27 RX ADMIN — KETOROLAC TROMETHAMINE 10 MG: 10 TABLET, FILM COATED ORAL at 06:08

## 2019-04-27 RX ADMIN — CEPHALEXIN 500 MG: 500 CAPSULE ORAL at 09:14

## 2019-04-27 RX ADMIN — LEVOTHYROXINE SODIUM 150 MCG: 150 TABLET ORAL at 08:34

## 2019-04-29 ENCOUNTER — TRANSITIONAL CARE MANAGEMENT (OUTPATIENT)
Dept: FAMILY MEDICINE CLINIC | Facility: CLINIC | Age: 49
End: 2019-04-29

## 2019-05-01 ENCOUNTER — OFFICE VISIT (OUTPATIENT)
Dept: FAMILY MEDICINE CLINIC | Facility: CLINIC | Age: 49
End: 2019-05-01
Payer: COMMERCIAL

## 2019-05-01 VITALS
WEIGHT: 199 LBS | TEMPERATURE: 98 F | DIASTOLIC BLOOD PRESSURE: 82 MMHG | OXYGEN SATURATION: 98 % | HEIGHT: 66 IN | BODY MASS INDEX: 31.98 KG/M2 | HEART RATE: 84 BPM | SYSTOLIC BLOOD PRESSURE: 120 MMHG

## 2019-05-01 DIAGNOSIS — D25.2 INTRAMURAL, SUBMUCOUS, AND SUBSEROUS LEIOMYOMA OF UTERUS: ICD-10-CM

## 2019-05-01 DIAGNOSIS — IMO0001 TRANSITION OF CARE PERFORMED WITH SHARING OF CLINICAL SUMMARY: Primary | ICD-10-CM

## 2019-05-01 DIAGNOSIS — H69.93 DISORDER OF BOTH EUSTACHIAN TUBES: ICD-10-CM

## 2019-05-01 DIAGNOSIS — D25.1 INTRAMURAL, SUBMUCOUS, AND SUBSEROUS LEIOMYOMA OF UTERUS: ICD-10-CM

## 2019-05-01 DIAGNOSIS — D25.0 INTRAMURAL, SUBMUCOUS, AND SUBSEROUS LEIOMYOMA OF UTERUS: ICD-10-CM

## 2019-05-01 PROBLEM — R21 RASH OF UNKNOWN CAUSE: Status: RESOLVED | Noted: 2019-02-05 | Resolved: 2019-05-01

## 2019-05-01 PROBLEM — L30.9 DERMATITIS: Status: RESOLVED | Noted: 2019-02-05 | Resolved: 2019-05-01

## 2019-05-01 PROBLEM — Z78.9 TRANSITION OF CARE PERFORMED WITH SHARING OF CLINICAL SUMMARY: Status: ACTIVE | Noted: 2019-05-01

## 2019-05-01 PROCEDURE — 99495 TRANSJ CARE MGMT MOD F2F 14D: CPT | Performed by: PHYSICIAN ASSISTANT

## 2019-05-01 PROCEDURE — 1111F DSCHRG MED/CURRENT MED MERGE: CPT | Performed by: PHYSICIAN ASSISTANT

## 2019-05-06 ENCOUNTER — TELEPHONE (OUTPATIENT)
Dept: OBGYN CLINIC | Facility: CLINIC | Age: 49
End: 2019-05-06

## 2019-05-06 DIAGNOSIS — Z00.00 HEALTH CARE MAINTENANCE: ICD-10-CM

## 2019-05-08 ENCOUNTER — OFFICE VISIT (OUTPATIENT)
Dept: OBGYN CLINIC | Facility: MEDICAL CENTER | Age: 49
End: 2019-05-08
Payer: COMMERCIAL

## 2019-05-08 VITALS — SYSTOLIC BLOOD PRESSURE: 128 MMHG | DIASTOLIC BLOOD PRESSURE: 76 MMHG | WEIGHT: 198 LBS | BODY MASS INDEX: 31.96 KG/M2

## 2019-05-08 DIAGNOSIS — Z30.42 ENCOUNTER FOR MANAGEMENT AND INJECTION OF DEPO-PROVERA: ICD-10-CM

## 2019-05-08 PROCEDURE — 96372 THER/PROPH/DIAG INJ SC/IM: CPT | Performed by: PHYSICIAN ASSISTANT

## 2019-05-08 RX ADMIN — MEDROXYPROGESTERONE ACETATE 150 MG: 150 INJECTION, SUSPENSION INTRAMUSCULAR at 10:12

## 2019-05-13 ENCOUNTER — TELEPHONE (OUTPATIENT)
Dept: OBGYN CLINIC | Facility: CLINIC | Age: 49
End: 2019-05-13

## 2019-05-15 ENCOUNTER — TELEPHONE (OUTPATIENT)
Dept: RADIOLOGY | Facility: HOSPITAL | Age: 49
End: 2019-05-15

## 2019-05-24 ENCOUNTER — TELEPHONE (OUTPATIENT)
Dept: FAMILY MEDICINE CLINIC | Facility: CLINIC | Age: 49
End: 2019-05-24

## 2019-05-24 DIAGNOSIS — H69.93 DISORDER OF BOTH EUSTACHIAN TUBES: Primary | ICD-10-CM

## 2019-05-24 RX ORDER — AMOXICILLIN 500 MG/1
500 CAPSULE ORAL EVERY 8 HOURS SCHEDULED
Qty: 30 CAPSULE | Refills: 0 | Status: SHIPPED | OUTPATIENT
Start: 2019-05-24 | End: 2019-06-03

## 2019-06-12 ENCOUNTER — OFFICE VISIT (OUTPATIENT)
Dept: FAMILY MEDICINE CLINIC | Facility: CLINIC | Age: 49
End: 2019-06-12
Payer: COMMERCIAL

## 2019-06-12 VITALS
SYSTOLIC BLOOD PRESSURE: 118 MMHG | OXYGEN SATURATION: 97 % | BODY MASS INDEX: 31.18 KG/M2 | HEIGHT: 66 IN | TEMPERATURE: 97.8 F | DIASTOLIC BLOOD PRESSURE: 80 MMHG | HEART RATE: 100 BPM | WEIGHT: 194 LBS

## 2019-06-12 DIAGNOSIS — H69.82 DYSFUNCTION OF LEFT EUSTACHIAN TUBE: Primary | ICD-10-CM

## 2019-06-12 PROCEDURE — 99213 OFFICE O/P EST LOW 20 MIN: CPT | Performed by: FAMILY MEDICINE

## 2019-06-12 RX ORDER — PREDNISONE 10 MG/1
TABLET ORAL
Qty: 15 TABLET | Refills: 0 | Status: SHIPPED | OUTPATIENT
Start: 2019-06-12 | End: 2019-06-17 | Stop reason: ALTCHOICE

## 2019-06-16 DIAGNOSIS — I10 HYPERTENSION, ESSENTIAL: ICD-10-CM

## 2019-06-17 ENCOUNTER — OFFICE VISIT (OUTPATIENT)
Dept: FAMILY MEDICINE CLINIC | Facility: CLINIC | Age: 49
End: 2019-06-17
Payer: COMMERCIAL

## 2019-06-17 VITALS
SYSTOLIC BLOOD PRESSURE: 116 MMHG | WEIGHT: 194.8 LBS | OXYGEN SATURATION: 98 % | HEIGHT: 66 IN | HEART RATE: 74 BPM | DIASTOLIC BLOOD PRESSURE: 82 MMHG | TEMPERATURE: 98.6 F | BODY MASS INDEX: 31.31 KG/M2

## 2019-06-17 DIAGNOSIS — H69.82 DYSFUNCTION OF LEFT EUSTACHIAN TUBE: Primary | ICD-10-CM

## 2019-06-17 DIAGNOSIS — R42 DIZZINESS: ICD-10-CM

## 2019-06-17 PROCEDURE — 99213 OFFICE O/P EST LOW 20 MIN: CPT | Performed by: FAMILY MEDICINE

## 2019-06-17 PROCEDURE — 3008F BODY MASS INDEX DOCD: CPT | Performed by: FAMILY MEDICINE

## 2019-06-17 RX ORDER — FLUTICASONE PROPIONATE 50 MCG
2 SPRAY, SUSPENSION (ML) NASAL DAILY
Qty: 1 BOTTLE | Refills: 1 | Status: SHIPPED | OUTPATIENT
Start: 2019-06-17 | End: 2019-08-14 | Stop reason: SDUPTHER

## 2019-06-17 RX ORDER — MECLIZINE HYDROCHLORIDE 25 MG/1
25 TABLET ORAL 3 TIMES DAILY PRN
Qty: 30 TABLET | Refills: 0 | Status: SHIPPED | OUTPATIENT
Start: 2019-06-17 | End: 2019-09-24 | Stop reason: ALTCHOICE

## 2019-06-18 RX ORDER — HYDROCHLOROTHIAZIDE 25 MG/1
TABLET ORAL
Qty: 90 TABLET | Refills: 0 | Status: SHIPPED | OUTPATIENT
Start: 2019-06-18 | End: 2019-09-13 | Stop reason: SDUPTHER

## 2019-07-01 ENCOUNTER — TELEPHONE (OUTPATIENT)
Dept: FAMILY MEDICINE CLINIC | Facility: CLINIC | Age: 49
End: 2019-07-01

## 2019-07-01 DIAGNOSIS — H69.82 DYSFUNCTION OF LEFT EUSTACHIAN TUBE: Primary | ICD-10-CM

## 2019-07-01 NOTE — TELEPHONE ENCOUNTER
Pt called and said that her ears is not better, she wants to know if you will need to see her or refer her to an ENT, pt said she feels her ears got worst   h-[phone]

## 2019-07-03 ENCOUNTER — ANNUAL EXAM (OUTPATIENT)
Dept: OBGYN CLINIC | Facility: MEDICAL CENTER | Age: 49
End: 2019-07-03
Payer: COMMERCIAL

## 2019-07-03 VITALS
WEIGHT: 194 LBS | SYSTOLIC BLOOD PRESSURE: 122 MMHG | HEIGHT: 66 IN | RESPIRATION RATE: 14 BRPM | DIASTOLIC BLOOD PRESSURE: 72 MMHG | BODY MASS INDEX: 31.18 KG/M2

## 2019-07-03 DIAGNOSIS — Z01.419 ENCOUNTER FOR ANNUAL ROUTINE GYNECOLOGICAL EXAMINATION: ICD-10-CM

## 2019-07-03 DIAGNOSIS — Z12.31 ENCOUNTER FOR SCREENING MAMMOGRAM FOR MALIGNANT NEOPLASM OF BREAST: Primary | ICD-10-CM

## 2019-07-03 DIAGNOSIS — Z01.419 ENCOUNTER FOR GYNECOLOGICAL EXAMINATION WITHOUT ABNORMAL FINDING: ICD-10-CM

## 2019-07-03 PROCEDURE — 99396 PREV VISIT EST AGE 40-64: CPT | Performed by: OBSTETRICS & GYNECOLOGY

## 2019-07-03 PROCEDURE — G0145 SCR C/V CYTO,THINLAYER,RESCR: HCPCS | Performed by: OBSTETRICS & GYNECOLOGY

## 2019-07-03 PROCEDURE — 87624 HPV HI-RISK TYP POOLED RSLT: CPT | Performed by: OBSTETRICS & GYNECOLOGY

## 2019-07-03 NOTE — PROGRESS NOTES
Assessment/Plan:      Diagnoses and all orders for this visit:    Encounter for screening mammogram for malignant neoplasm of breast  -     Mammo screening bilateral w cad; Future    Encounter for annual routine gynecological examination  -     Liquid-based pap, screening; Future  -     Liquid-based pap, screening    Encounter for gynecological examination without abnormal finding          Subjective:     Patient ID: Roxane Wang is a 53 y o  female  Patient is a 25-year-old female, para 1, [place],  here for yearly gynecologic exam without complaint  Patient recently 1 under uterine artery embolization and is continuing to take Depo-Provera  Patient would like to continue Depo-Provera despite its possible stimulation of fibroid growth  Review of systems is negative for vulvar vaginal or anal irritation  Negative for pelvic or abdominal pain  Negative for breast complaints  Negative for blood in urine or stool  Past medical history is significant for chronic left ear pain, Vertigo, and hypothyroidism  Past surgical history significant for  cholecystectomy and uterine artery embolization  Current medications are meclizine Depo-Provera and levothyroxine  No known drug allergies  Patient denies a family history of breast ovarian or colon cancer  Review of Systems   Gastrointestinal: Negative for abdominal pain  Genitourinary: Negative for genital sores, hematuria, menstrual problem, pelvic pain, vaginal bleeding and vaginal discharge  Objective:     Physical Exam   Constitutional: She is oriented to person, place, and time  She appears well-developed and well-nourished  HENT:   Head: Normocephalic  Neck: No thyromegaly present  Cardiovascular: Normal rate, regular rhythm and normal heart sounds  Pulmonary/Chest: Effort normal and breath sounds normal  Right breast exhibits no inverted nipple, no mass, no nipple discharge and no skin change   Left breast exhibits no inverted nipple, no mass, no nipple discharge, no skin change and no tenderness  Abdominal: Soft  She exhibits no distension  There is no tenderness  There is no rebound and no guarding  No hernia  Genitourinary: Vagina normal    Genitourinary Comments: Enlarged fibroid uterus movable  Neurological: She is alert and oriented to person, place, and time  Skin: Skin is warm and dry  Psychiatric: She has a normal mood and affect   Her behavior is normal

## 2019-07-05 LAB
HPV HR 12 DNA CVX QL NAA+PROBE: NEGATIVE
HPV16 DNA CVX QL NAA+PROBE: NEGATIVE
HPV18 DNA CVX QL NAA+PROBE: NEGATIVE

## 2019-07-09 LAB
LAB AP GYN PRIMARY INTERPRETATION: NORMAL
Lab: NORMAL

## 2019-07-13 DIAGNOSIS — E03.9 HYPOTHYROIDISM, ADULT: ICD-10-CM

## 2019-07-15 RX ORDER — LEVOTHYROXINE SODIUM 0.15 MG/1
TABLET ORAL
Qty: 30 TABLET | Refills: 3 | Status: SHIPPED | OUTPATIENT
Start: 2019-07-15 | End: 2019-11-15 | Stop reason: SDUPTHER

## 2019-08-02 ENCOUNTER — CLINICAL SUPPORT (OUTPATIENT)
Dept: OBGYN CLINIC | Facility: MEDICAL CENTER | Age: 49
End: 2019-08-02
Payer: COMMERCIAL

## 2019-08-02 VITALS — BODY MASS INDEX: 31.31 KG/M2 | WEIGHT: 194 LBS | SYSTOLIC BLOOD PRESSURE: 128 MMHG | DIASTOLIC BLOOD PRESSURE: 76 MMHG

## 2019-08-02 DIAGNOSIS — Z30.42 ENCOUNTER FOR MANAGEMENT AND INJECTION OF DEPO-PROVERA: ICD-10-CM

## 2019-08-02 PROCEDURE — 96372 THER/PROPH/DIAG INJ SC/IM: CPT | Performed by: PHYSICIAN ASSISTANT

## 2019-08-02 RX ORDER — MEDROXYPROGESTERONE ACETATE 150 MG/ML
150 INJECTION, SUSPENSION INTRAMUSCULAR ONCE
Status: COMPLETED | OUTPATIENT
Start: 2019-08-02 | End: 2019-08-02

## 2019-08-02 RX ADMIN — MEDROXYPROGESTERONE ACETATE 150 MG: 150 INJECTION, SUSPENSION INTRAMUSCULAR at 16:21

## 2019-08-02 RX ADMIN — MEDROXYPROGESTERONE ACETATE 150 MG: 150 INJECTION, SUSPENSION INTRAMUSCULAR at 16:26

## 2019-08-02 NOTE — PROGRESS NOTES
Pt here for depo, last injection given 5/8/19 IM in left deltoid, yearly done 7/3/19 with Dr Makeda Albarado  Depo given IM in left deltoid, tolerated well  Pt scheduled next injection

## 2019-08-14 DIAGNOSIS — H69.82 DYSFUNCTION OF LEFT EUSTACHIAN TUBE: ICD-10-CM

## 2019-08-14 RX ORDER — FLUTICASONE PROPIONATE 50 MCG
SPRAY, SUSPENSION (ML) NASAL
Qty: 16 ML | Refills: 1 | Status: SHIPPED | OUTPATIENT
Start: 2019-08-14 | End: 2019-10-16 | Stop reason: SDUPTHER

## 2019-09-06 ENCOUNTER — TRANSCRIBE ORDERS (OUTPATIENT)
Dept: ADMINISTRATIVE | Facility: HOSPITAL | Age: 49
End: 2019-09-06

## 2019-09-06 DIAGNOSIS — J32.2 CHRONIC ETHMOIDAL SINUSITIS: ICD-10-CM

## 2019-09-06 DIAGNOSIS — H68.023 CHRONIC EUSTACHIAN SALPINGITIS OF BOTH EARS: Primary | ICD-10-CM

## 2019-09-12 ENCOUNTER — HOSPITAL ENCOUNTER (OUTPATIENT)
Dept: CT IMAGING | Facility: HOSPITAL | Age: 49
Discharge: HOME | End: 2019-09-12
Attending: SPECIALIST
Payer: COMMERCIAL

## 2019-09-12 DIAGNOSIS — J32.2 CHRONIC ETHMOIDAL SINUSITIS: ICD-10-CM

## 2019-09-12 DIAGNOSIS — H68.023 CHRONIC EUSTACHIAN SALPINGITIS OF BOTH EARS: ICD-10-CM

## 2019-09-12 PROCEDURE — 70486 CT MAXILLOFACIAL W/O DYE: CPT

## 2019-09-13 DIAGNOSIS — I10 HYPERTENSION, ESSENTIAL: ICD-10-CM

## 2019-09-13 RX ORDER — HYDROCHLOROTHIAZIDE 25 MG/1
TABLET ORAL
Qty: 90 TABLET | Refills: 1 | Status: SHIPPED | OUTPATIENT
Start: 2019-09-13 | End: 2020-03-25

## 2019-09-24 ENCOUNTER — OFFICE VISIT (OUTPATIENT)
Dept: FAMILY MEDICINE CLINIC | Facility: CLINIC | Age: 49
End: 2019-09-24
Payer: COMMERCIAL

## 2019-09-24 VITALS
OXYGEN SATURATION: 97 % | HEIGHT: 66 IN | WEIGHT: 197 LBS | BODY MASS INDEX: 31.66 KG/M2 | TEMPERATURE: 98.8 F | HEART RATE: 90 BPM | DIASTOLIC BLOOD PRESSURE: 88 MMHG | SYSTOLIC BLOOD PRESSURE: 124 MMHG

## 2019-09-24 DIAGNOSIS — E03.9 ACQUIRED HYPOTHYROIDISM: ICD-10-CM

## 2019-09-24 DIAGNOSIS — F41.1 GENERALIZED ANXIETY DISORDER: Primary | ICD-10-CM

## 2019-09-24 DIAGNOSIS — I10 BENIGN ESSENTIAL HYPERTENSION: ICD-10-CM

## 2019-09-24 PROCEDURE — 99213 OFFICE O/P EST LOW 20 MIN: CPT | Performed by: PHYSICIAN ASSISTANT

## 2019-09-24 PROCEDURE — 3074F SYST BP LT 130 MM HG: CPT | Performed by: PHYSICIAN ASSISTANT

## 2019-09-24 PROCEDURE — 3079F DIAST BP 80-89 MM HG: CPT | Performed by: PHYSICIAN ASSISTANT

## 2019-09-24 RX ORDER — BUSPIRONE HYDROCHLORIDE 5 MG/1
5 TABLET ORAL 2 TIMES DAILY
Qty: 60 TABLET | Refills: 5 | Status: SHIPPED | OUTPATIENT
Start: 2019-09-24 | End: 2020-01-28 | Stop reason: SDUPTHER

## 2019-09-24 NOTE — PROGRESS NOTES
Assessment/Plan:       Diagnoses and all orders for this visit:    Generalized anxiety disorder  -     busPIRone (BUSPAR) 5 mg tablet; Take 1 tablet (5 mg total) by mouth 2 (two) times a day  -     TSH, 3rd generation; Future  -     T4, free; Future  -     T3; Future    Benign essential hypertension    Acquired hypothyroidism  -     TSH, 3rd generation; Future  -     T4, free; Future  -     T3; Future          Subjective:      Patient ID: Ceci Ellison is a 51 y o  female  Anxiety   Presents for initial visit  Onset was 1 to 5 years ago  The problem has been gradually worsening  Symptoms include compulsions, decreased concentration, excessive worry, hyperventilation, insomnia, irritability, muscle tension, nervous/anxious behavior, palpitations, panic and restlessness  Patient reports no chest pain, dizziness, shortness of breath or suicidal ideas  Symptoms occur most days  The severity of symptoms is causing significant distress  Nothing aggravates the symptoms  The quality of sleep is poor  Nighttime awakenings: several      There are no known risk factors  Her past medical history is significant for anxiety/panic attacks  There is no history of bipolar disorder, hyperthyroidism or suicide attempts  Past treatments include nothing  The treatment provided no relief  The following portions of the patient's history were reviewed and updated as appropriate:   She has a past medical history of Disease of thyroid gland and Hypertension  ,  does not have any pertinent problems on file  ,   has a past surgical history that includes  section, low transverse; Cholecystectomy; Tooth extraction; and IR [place] (2019)  ,  family history is not on file  ,   reports that she has never smoked  She has never used smokeless tobacco  She reports that she does not drink alcohol or use drugs  ,  has No Known Allergies     Current Outpatient Medications   Medication Sig Dispense Refill    fluticasone (FLONASE) 50 mcg/act nasal spray SPRAY 2 SPRAYS INTO EACH NOSTRIL EVERY DAY 16 mL 1    hydrochlorothiazide (HYDRODIURIL) 25 mg tablet TAKE 1 TABLET BY MOUTH EVERY DAY IN THE MORNING 90 tablet 1    levothyroxine 150 mcg tablet TAKE 1 TABLET BY MOUTH EVERY DAY 30 tablet 3    Omega-3 Fatty Acids ([employer] NATURAL FISH OIL) 1000 MG CAPS Take by mouth      busPIRone (BUSPAR) 5 mg tablet Take 1 tablet (5 mg total) by mouth 2 (two) times a day 60 tablet 5    medroxyPROGESTERone (DEPO-PROVERA) 150 mg/mL injection Inject 1 mL (150 mg total) into a muscle every 3 (three) months 1 mL 3     Current Facility-Administered Medications   Medication Dose Route Frequency Provider Last Rate Last Dose    medroxyPROGESTERone (DEPO-PROVERA) IM injection 150 mg  150 mg Intramuscular Q3 Months Keenan Mercer MD   150 mg at 08/02/19 1621       Review of Systems   Constitutional: Positive for fatigue and irritability  Negative for activity change and appetite change  Respiratory: Positive for chest tightness  Negative for shortness of breath and wheezing  Cardiovascular: Positive for palpitations  Negative for chest pain  Gastrointestinal: Negative for abdominal pain  Genitourinary: Negative for difficulty urinating  Musculoskeletal: Negative for arthralgias  Skin: Negative for rash  Neurological: Negative for dizziness, light-headedness and headaches  Psychiatric/Behavioral: Positive for decreased concentration and sleep disturbance  Negative for self-injury and suicidal ideas  The patient is nervous/anxious and has insomnia  The patient is not hyperactive  FAUZIA 7 is 15    Objective:  Vitals:    09/24/19 0811   BP: 124/88   Pulse: 90   Temp: 98 8 °F (37 1 °C)   SpO2: 97%   Weight: 89 4 kg (197 lb)   Height: 5' 6" (1 676 m)     Body mass index is 31 8 kg/m²  Physical Exam   Constitutional: She is oriented to person, place, and time  She appears well-developed and well-nourished  HENT:   Head: Normocephalic     Cardiovascular: Normal rate  Pulmonary/Chest: Effort normal    Neurological: She is alert and oriented to person, place, and time  Psychiatric: Her speech is normal and behavior is normal  Judgment and thought content normal  Her mood appears anxious  Cognition and memory are normal    Nursing note and vitals reviewed

## 2019-09-24 NOTE — PATIENT INSTRUCTIONS
Anxiety   WHAT YOU NEED TO KNOW:   What do I need to know about anxiety? Anxiety is a condition that causes you to feel extremely worried or nervous  The feelings are so strong that they can cause problems with your daily activities or sleep  Anxiety may be triggered by something you fear, or it may happen without a cause  Family or work stress, smoking, caffeine, and alcohol can increase your risk for anxiety  Certain medicines or health conditions can also increase your risk  Anxiety can become a long-term condition if it is not managed or treated  What other common signs and symptoms may occur with anxiety? · Fatigue or muscle tightness     · Shaking, restlessness, or irritability     · Problems focusing     · Trouble sleeping     · Feeling jumpy, easily startled, or dizzy     · Rapid heartbeat or shortness of breath  What do I need to tell my healthcare provider about my anxiety? Tell your healthcare provider when your symptoms began and what triggers them  Tell your provider if anxiety affects your daily activities  Your provider will also ask about your medical history and if you have family members with a similar condition  Tell your provider about your past and present alcohol, nicotine, or drug use  What can I do to manage anxiety? You may get medicines to help you feel calm and relaxed, and to decrease your symptoms  Medicines are usually given together with therapy or other treatments  The following can help you manage anxiety:  · Talk to someone about your anxiety  Your healthcare provider may suggest counseling  Cognitive behavioral therapy can help you understand and change how you react to events that trigger your symptoms  You might feel more comfortable talking with a friend or family member about your anxiety  Choose someone you know will be supportive and encouraging  · Find ways to relax  Activities such as exercise, meditation, or listening to music can help you relax   Spend time with friends, or do things you enjoy  · Practice deep breathing  Deep breathing can help you relax when you feel anxious  Focus on taking slow, deep breaths several times a day, or during an anxiety attack  Breathe in through your nose and out through your mouth  · Create a regular sleep routine  Regular sleep can help you feel calmer during the day  Go to sleep and wake up at the same times every day  Do not watch television or use the computer right before bed  Your room should be comfortable, dark, and quiet  · Eat a variety of healthy foods  Healthy foods include fruits, vegetables, low-fat dairy products, lean meats, fish, whole-grain breads, and cooked beans  Healthy foods can help you feel less anxious and have more energy  · Exercise regularly  Exercise can increase your energy level  Exercise may also lift your mood and help you sleep better  Your healthcare provider can help you create an exercise plan  · Do not smoke  Nicotine and other chemicals in cigarettes and cigars can increase anxiety  Ask your healthcare provider for information if you currently smoke and need help to quit  E-cigarettes or smokeless tobacco still contain nicotine  Talk to your healthcare provider before you use these products  · Do not have caffeine  Caffeine can make your symptoms worse  Do not have foods or drinks that are meant to increase your energy level  · Limit or do not drink alcohol  Ask your healthcare provider if alcohol is safe for you  You may not be able to drink alcohol if you take certain anxiety or depression medicines  Limit alcohol to 1 drink per day if you are a woman  Limit alcohol to 2 drinks per day if you are a man  A drink of alcohol is 12 ounces of beer, 5 ounces of wine, or 1½ ounces of liquor  · Do not use drugs  Drugs can make your anxiety worse  It can also make anxiety hard to manage  Talk to your healthcare provider if you use drugs and want help to quit    Call 911 if:   · You have chest pain, tightness, or heaviness that may spread to your shoulders, arms, jaw, neck, or back  · You feel like hurting yourself or someone else  When should I contact my healthcare provider? · Your symptoms get worse or do not get better with treatment  · Your anxiety keeps you from doing your regular daily activities  · You have new symptoms since your last visit  · You have questions or concerns about your condition or care  CARE AGREEMENT:   You have the right to help plan your care  Learn about your health condition and how it may be treated  Discuss treatment options with your caregivers to decide what care you want to receive  You always have the right to refuse treatment  The above information is an  only  It is not intended as medical advice for individual conditions or treatments  Talk to your doctor, nurse or pharmacist before following any medical regimen to see if it is safe and effective for you  © 2017 [employer] Information is for End User's use only and may not be sold, redistributed or otherwise used for commercial purposes  All illustrations and images included in CareNotes® are the copyrighted property of [employer]  or [employer] Ramírez

## 2019-10-04 ENCOUNTER — APPOINTMENT (OUTPATIENT)
Dept: LAB | Facility: HOSPITAL | Age: 49
End: 2019-10-04
Payer: COMMERCIAL

## 2019-10-04 ENCOUNTER — TELEPHONE (OUTPATIENT)
Dept: FAMILY MEDICINE CLINIC | Facility: CLINIC | Age: 49
End: 2019-10-04

## 2019-10-04 DIAGNOSIS — F41.1 GENERALIZED ANXIETY DISORDER: ICD-10-CM

## 2019-10-04 DIAGNOSIS — E03.9 ACQUIRED HYPOTHYROIDISM: ICD-10-CM

## 2019-10-04 LAB
T3 SERPL-MCNC: 1.1 NG/ML (ref 0.6–1.8)
T4 FREE SERPL-MCNC: 1.24 NG/DL (ref 0.76–1.46)
TSH SERPL DL<=0.05 MIU/L-ACNC: 0.74 UIU/ML (ref 0.36–3.74)

## 2019-10-04 PROCEDURE — 84439 ASSAY OF FREE THYROXINE: CPT

## 2019-10-04 PROCEDURE — 84443 ASSAY THYROID STIM HORMONE: CPT

## 2019-10-04 PROCEDURE — 36415 COLL VENOUS BLD VENIPUNCTURE: CPT

## 2019-10-04 PROCEDURE — 84480 ASSAY TRIIODOTHYRONINE (T3): CPT

## 2019-10-04 NOTE — TELEPHONE ENCOUNTER
----- Message from Won Pugh PA-C sent at 10/4/2019  1:17 PM EDT -----  Please let patient know labs are normal

## 2019-10-09 ENCOUNTER — OFFICE VISIT (OUTPATIENT)
Dept: FAMILY MEDICINE CLINIC | Facility: CLINIC | Age: 49
End: 2019-10-09
Payer: COMMERCIAL

## 2019-10-09 VITALS
OXYGEN SATURATION: 98 % | HEART RATE: 87 BPM | WEIGHT: 196 LBS | SYSTOLIC BLOOD PRESSURE: 120 MMHG | BODY MASS INDEX: 31.5 KG/M2 | DIASTOLIC BLOOD PRESSURE: 84 MMHG | HEIGHT: 66 IN | TEMPERATURE: 98.4 F

## 2019-10-09 DIAGNOSIS — F32.0 MAJOR DEPRESSIVE DISORDER, SINGLE EPISODE, MILD WITH ANXIOUS DISTRESS (HCC): Primary | ICD-10-CM

## 2019-10-09 PROCEDURE — 99213 OFFICE O/P EST LOW 20 MIN: CPT | Performed by: PHYSICIAN ASSISTANT

## 2019-10-09 RX ORDER — MONTELUKAST SODIUM 10 MG/1
10 TABLET ORAL
COMMUNITY
Start: 2019-10-03

## 2019-10-09 RX ORDER — BUPROPION HYDROCHLORIDE 150 MG/1
150 TABLET ORAL EVERY MORNING
Qty: 30 TABLET | Refills: 2 | Status: SHIPPED | OUTPATIENT
Start: 2019-10-09 | End: 2020-01-02 | Stop reason: SDUPTHER

## 2019-10-09 NOTE — PROGRESS NOTES
Assessment/Plan:  BMI Counseling: Body mass index is 31 64 kg/m²  The BMI is above normal  Nutrition recommendations include encouraging healthy choices of fruits and vegetables, limiting drinks that contain sugar, moderation in carbohydrate intake and increasing intake of lean protein  Exercise recommendations include moderate physical activity 150 minutes/week  No pharmacotherapy was ordered  Diagnoses and all orders for this visit:    Major depressive disorder, single episode, mild with anxious distress (HCC)  -     buPROPion (WELLBUTRIN XL) 150 mg 24 hr tablet; Take 1 tablet (150 mg total) by mouth every morning    Other orders  -     montelukast (SINGULAIR) 10 mg tablet            Subjective:      Patient ID: Alba Gusman is a 51 y o  female  Patient is here for follow-up of her anxiety and starting BuSpar  She feels the BuSpar is helping her anxiety but she notes now she feels like she wants to cry frequently  She says it feels like he watched a 3 hour long sad movie  She denies any feelings of wanting to hurt herself or anyone else  The following portions of the patient's history were reviewed and updated as appropriate:   She has a past medical history of Disease of thyroid gland and Hypertension  ,  does not have any pertinent problems on file  ,   has a past surgical history that includes  section, low transverse; Cholecystectomy; Tooth extraction; and IR [place] (2019)  ,  family history is not on file  ,   reports that she has never smoked  She has never used smokeless tobacco  She reports that she does not drink alcohol or use drugs  ,  has No Known Allergies     Current Outpatient Medications   Medication Sig Dispense Refill    busPIRone (BUSPAR) 5 mg tablet Take 1 tablet (5 mg total) by mouth 2 (two) times a day 60 tablet 5    fluticasone (FLONASE) 50 mcg/act nasal spray SPRAY 2 SPRAYS INTO EACH NOSTRIL EVERY DAY 16 mL 1    hydrochlorothiazide (HYDRODIURIL) 25 mg tablet TAKE 1 TABLET BY MOUTH EVERY DAY IN THE MORNING 90 tablet 1    levothyroxine 150 mcg tablet TAKE 1 TABLET BY MOUTH EVERY DAY 30 tablet 3    medroxyPROGESTERone (DEPO-PROVERA) 150 mg/mL injection Inject 1 mL (150 mg total) into a muscle every 3 (three) months 1 mL 3    montelukast (SINGULAIR) 10 mg tablet       Omega-3 Fatty Acids ([employer] NATURAL FISH OIL) 1000 MG CAPS Take by mouth      buPROPion (WELLBUTRIN XL) 150 mg 24 hr tablet Take 1 tablet (150 mg total) by mouth every morning 30 tablet 2     Current Facility-Administered Medications   Medication Dose Route Frequency Provider Last Rate Last Dose    medroxyPROGESTERone (DEPO-PROVERA) IM injection 150 mg  150 mg Intramuscular Q3 Months Jenn Young MD   150 mg at 08/02/19 1621       Review of Systems   Constitutional: Negative for activity change, appetite change and fatigue  Respiratory: Negative for shortness of breath  Cardiovascular: Negative for chest pain  Gastrointestinal: Negative for abdominal pain and nausea  Neurological: Negative for dizziness and headaches  Psychiatric/Behavioral: Positive for dysphoric mood  Negative for self-injury and suicidal ideas  The patient is nervous/anxious  Objective:  Vitals:    10/09/19 0828   BP: 120/84   Pulse: 87   Temp: 98 4 °F (36 9 °C)   SpO2: 98%   Weight: 88 9 kg (196 lb)   Height: 5' 6" (1 676 m)     Body mass index is 31 64 kg/m²  Physical Exam   Constitutional: She is oriented to person, place, and time  She appears well-developed and well-nourished  HENT:   Head: Normocephalic  Eyes: Conjunctivae are normal    Neck: Normal range of motion  Cardiovascular: Normal rate and regular rhythm  Pulmonary/Chest: Effort normal    Neurological: She is alert and oriented to person, place, and time  Skin: Skin is warm and dry     Psychiatric: Her speech is normal and behavior is normal  Judgment and thought content normal  Cognition and memory are normal  She exhibits a depressed mood  Nursing note and vitals reviewed

## 2019-10-16 DIAGNOSIS — H69.82 DYSFUNCTION OF LEFT EUSTACHIAN TUBE: ICD-10-CM

## 2019-10-16 RX ORDER — FLUTICASONE PROPIONATE 50 MCG
SPRAY, SUSPENSION (ML) NASAL
Qty: 16 ML | Refills: 1 | Status: SHIPPED | OUTPATIENT
Start: 2019-10-16 | End: 2021-11-05 | Stop reason: ALTCHOICE

## 2019-10-21 ENCOUNTER — OFFICE VISIT (OUTPATIENT)
Dept: FAMILY MEDICINE CLINIC | Facility: CLINIC | Age: 49
End: 2019-10-21
Payer: COMMERCIAL

## 2019-10-21 VITALS
HEART RATE: 90 BPM | HEIGHT: 66 IN | SYSTOLIC BLOOD PRESSURE: 122 MMHG | BODY MASS INDEX: 31.02 KG/M2 | DIASTOLIC BLOOD PRESSURE: 78 MMHG | OXYGEN SATURATION: 98 % | WEIGHT: 193 LBS | TEMPERATURE: 97.8 F

## 2019-10-21 DIAGNOSIS — T78.40XA ALLERGIC REACTION, INITIAL ENCOUNTER: Primary | ICD-10-CM

## 2019-10-21 PROCEDURE — 99213 OFFICE O/P EST LOW 20 MIN: CPT | Performed by: PHYSICIAN ASSISTANT

## 2019-10-21 PROCEDURE — 3008F BODY MASS INDEX DOCD: CPT | Performed by: PHYSICIAN ASSISTANT

## 2019-10-21 RX ORDER — PREDNISONE 10 MG/1
TABLET ORAL
Qty: 15 TABLET | Refills: 0 | Status: SHIPPED | OUTPATIENT
Start: 2019-10-21 | End: 2019-10-28 | Stop reason: ALTCHOICE

## 2019-10-21 NOTE — PROGRESS NOTES
Assessment/Plan:          Diagnoses and all orders for this visit:    Allergic reaction, initial encounter  -     predniSONE 10 mg tablet; Five pills p o  For 1 day, 4 pills p o  for 1 day, 3 pills po for 1 day, 2 pills p o  For 1 day, 1 pill p o  For 1 day            Subjective:      Patient ID: Colleen Fan is a 51 y o  female  Rash   This is a new problem  The current episode started yesterday  The problem has been gradually worsening since onset  The affected locations include the face, left arm, left upper leg, right arm and right upper leg  The rash is characterized by swelling, redness and itchiness  It is unknown if there was an exposure to a precipitant  Pertinent negatives include no anorexia, congestion, cough, diarrhea, eye pain, facial edema, fatigue, fever, joint pain, nail changes, rhinorrhea, shortness of breath, sore throat or vomiting  Past treatments include antihistamine and cold compress  The treatment provided mild relief  The following portions of the patient's history were reviewed and updated as appropriate:   She has a past medical history of Disease of thyroid gland and Hypertension  ,  does not have any pertinent problems on file  ,   has a past surgical history that includes  section, low transverse; Cholecystectomy; Tooth extraction; and IR [place] (2019)  ,  family history is not on file  ,   reports that she has never smoked  She has never used smokeless tobacco  She reports that she does not drink alcohol or use drugs  ,  has No Known Allergies     Current Outpatient Medications   Medication Sig Dispense Refill    buPROPion (WELLBUTRIN XL) 150 mg 24 hr tablet Take 1 tablet (150 mg total) by mouth every morning 30 tablet 2    busPIRone (BUSPAR) 5 mg tablet Take 1 tablet (5 mg total) by mouth 2 (two) times a day 60 tablet 5    fluticasone (FLONASE) 50 mcg/act nasal spray SPRAY 2 SPRAYS INTO EACH NOSTRIL EVERY DAY 16 mL 1    hydrochlorothiazide (HYDRODIURIL) 25 mg tablet TAKE 1 TABLET BY MOUTH EVERY DAY IN THE MORNING 90 tablet 1    levothyroxine 150 mcg tablet TAKE 1 TABLET BY MOUTH EVERY DAY 30 tablet 3    medroxyPROGESTERone (DEPO-PROVERA) 150 mg/mL injection Inject 1 mL (150 mg total) into a muscle every 3 (three) months 1 mL 3    Omega-3 Fatty Acids ([employer] NATURAL FISH OIL) 1000 MG CAPS Take by mouth      montelukast (SINGULAIR) 10 mg tablet       predniSONE 10 mg tablet Five pills p o  For 1 day, 4 pills p o  for 1 day, 3 pills po for 1 day, 2 pills p o  For 1 day, 1 pill p o  For 1 day 15 tablet 0     Current Facility-Administered Medications   Medication Dose Route Frequency Provider Last Rate Last Dose    medroxyPROGESTERone (DEPO-PROVERA) IM injection 150 mg  150 mg Intramuscular Q3 Months Cari Vasquez MD   150 mg at 08/02/19 1621       Review of Systems   Constitutional: Negative for appetite change, chills, fatigue and fever  HENT: Negative for congestion, facial swelling, rhinorrhea, sore throat and trouble swallowing  Eyes: Negative for pain  Respiratory: Negative for cough, chest tightness and shortness of breath  Cardiovascular: Negative for chest pain  Gastrointestinal: Negative for anorexia, diarrhea, nausea and vomiting  Musculoskeletal: Negative for joint pain  Skin: Positive for rash  Negative for nail changes  Neurological: Negative for dizziness, light-headedness and headaches  Objective:  Vitals:    10/21/19 0920   BP: 122/78   Pulse: 90   Temp: 97 8 °F (36 6 °C)   SpO2: 98%   Weight: 87 5 kg (193 lb)   Height: 5' 6" (1 676 m)     Body mass index is 31 15 kg/m²  Physical Exam   Constitutional: She is oriented to person, place, and time  She appears well-developed and well-nourished  HENT:   Head: Normocephalic  Eyes: Conjunctivae are normal    Cardiovascular: Normal rate and regular rhythm  Pulmonary/Chest: Effort normal    Neurological: She is alert and oriented to person, place, and time     Skin: Rash noted  Rash is urticarial  There is erythema  Areas of scattered urticaria marked in red   Psychiatric: She has a normal mood and affect  Her behavior is normal    Nursing note and vitals reviewed

## 2019-10-24 ENCOUNTER — CLINICAL SUPPORT (OUTPATIENT)
Dept: OBGYN CLINIC | Facility: MEDICAL CENTER | Age: 49
End: 2019-10-24
Payer: COMMERCIAL

## 2019-10-24 VITALS — SYSTOLIC BLOOD PRESSURE: 120 MMHG | WEIGHT: 193 LBS | DIASTOLIC BLOOD PRESSURE: 76 MMHG | BODY MASS INDEX: 31.15 KG/M2

## 2019-10-24 DIAGNOSIS — Z30.42 ENCOUNTER FOR MANAGEMENT AND INJECTION OF DEPO-PROVERA: ICD-10-CM

## 2019-10-24 PROCEDURE — 96372 THER/PROPH/DIAG INJ SC/IM: CPT

## 2019-10-24 RX ORDER — MEDROXYPROGESTERONE ACETATE 150 MG/ML
150 INJECTION, SUSPENSION INTRAMUSCULAR ONCE
Status: COMPLETED | OUTPATIENT
Start: 2019-10-24 | End: 2019-10-24

## 2019-10-24 RX ADMIN — MEDROXYPROGESTERONE ACETATE 150 MG: 150 INJECTION, SUSPENSION INTRAMUSCULAR at 16:12

## 2019-10-24 NOTE — PROGRESS NOTES
Pt here for depo injection, denies problems or concerns  Injection given IM in left deltoid, pt tolerated well  Pt will [facility] office to schedule next appt

## 2019-10-28 ENCOUNTER — OFFICE VISIT (OUTPATIENT)
Dept: FAMILY MEDICINE CLINIC | Facility: CLINIC | Age: 49
End: 2019-10-28
Payer: COMMERCIAL

## 2019-10-28 VITALS
BODY MASS INDEX: 31.02 KG/M2 | DIASTOLIC BLOOD PRESSURE: 84 MMHG | HEIGHT: 66 IN | SYSTOLIC BLOOD PRESSURE: 120 MMHG | HEART RATE: 92 BPM | TEMPERATURE: 98.4 F | OXYGEN SATURATION: 98 % | WEIGHT: 193 LBS

## 2019-10-28 DIAGNOSIS — F41.1 GENERALIZED ANXIETY DISORDER: Primary | ICD-10-CM

## 2019-10-28 PROCEDURE — 99213 OFFICE O/P EST LOW 20 MIN: CPT | Performed by: PHYSICIAN ASSISTANT

## 2019-10-28 NOTE — PROGRESS NOTES
Assessment/Plan:          Diagnoses and all orders for this visit:    Generalized anxiety disorder        Continue current medications and follow up in 3 months  Subjective:      Patient ID: Daljit Madera is a 51 y o  female  Anxiety   Presents for follow-up visit  Patient reports no confusion, decreased concentration, depressed mood, dizziness, excessive worry, feeling of choking, insomnia, irritability, nausea, nervous/anxious behavior, palpitations, panic, restlessness, shortness of breath or suicidal ideas  Symptoms occur occasionally  The severity of symptoms is mild  The quality of sleep is good  Nighttime awakenings: occasional      Compliance with medications is %  The following portions of the patient's history were reviewed and updated as appropriate:   She has a past medical history of Disease of thyroid gland and Hypertension  ,  does not have any pertinent problems on file  ,   has a past surgical history that includes  section, low transverse; Cholecystectomy; Tooth extraction; and IR [facility] (2019)  ,  family history is not on file  ,   reports that she has never smoked  She has never used smokeless tobacco  She reports that she does not drink alcohol or use drugs  ,  has No Known Allergies     Current Outpatient Medications   Medication Sig Dispense Refill    buPROPion (WELLBUTRIN XL) 150 mg 24 hr tablet Take 1 tablet (150 mg total) by mouth every morning 30 tablet 2    busPIRone (BUSPAR) 5 mg tablet Take 1 tablet (5 mg total) by mouth 2 (two) times a day 60 tablet 5    fluticasone (FLONASE) 50 mcg/act nasal spray SPRAY 2 SPRAYS INTO EACH NOSTRIL EVERY DAY 16 mL 1    hydrochlorothiazide (HYDRODIURIL) 25 mg tablet TAKE 1 TABLET BY MOUTH EVERY DAY IN THE MORNING 90 tablet 1    levothyroxine 150 mcg tablet TAKE 1 TABLET BY MOUTH EVERY DAY 30 tablet 3    medroxyPROGESTERone (DEPO-PROVERA) 150 mg/mL injection Inject 1 mL (150 mg total) into a muscle every 3 (three) months 1 mL 3    montelukast (SINGULAIR) 10 mg tablet       Omega-3 Fatty Acids ([employer] NATURAL FISH OIL) 1000 MG CAPS Take by mouth       Current Facility-Administered Medications   Medication Dose Route Frequency Provider Last Rate Last Dose    medroxyPROGESTERone (DEPO-PROVERA) IM injection 150 mg  150 mg Intramuscular Q3 Months Luana Halsted, MD   150 mg at 08/02/19 1621       Review of Systems   Constitutional: Negative for activity change, appetite change, fatigue, fever and irritability  HENT: Negative for trouble swallowing  Respiratory: Negative for chest tightness and shortness of breath  Cardiovascular: Negative for palpitations  Gastrointestinal: Negative for nausea  Skin: Negative for rash  Neurological: Negative for dizziness, light-headedness and headaches  Psychiatric/Behavioral: Negative for confusion, decreased concentration, self-injury, sleep disturbance and suicidal ideas  The patient is not nervous/anxious and does not have insomnia  Objective:  Vitals:    10/28/19 1350   BP: 120/84   Pulse: 92   Temp: 98 4 °F (36 9 °C)   SpO2: 98%   Weight: 87 5 kg (193 lb)   Height: 5' 6" (1 676 m)     Body mass index is 31 15 kg/m²  Physical Exam   Constitutional: She is oriented to person, place, and time  She appears well-developed and well-nourished  HENT:   Head: Normocephalic  Eyes: Conjunctivae are normal    Cardiovascular: Normal rate and regular rhythm  Pulmonary/Chest: Effort normal    Neurological: She is alert and oriented to person, place, and time  Psychiatric: She has a normal mood and affect  Her behavior is normal  Judgment and thought content normal    Nursing note and vitals reviewed

## 2019-11-15 DIAGNOSIS — E03.9 HYPOTHYROIDISM, ADULT: ICD-10-CM

## 2019-11-15 RX ORDER — LEVOTHYROXINE SODIUM 0.15 MG/1
150 TABLET ORAL DAILY
Qty: 30 TABLET | Refills: 3 | Status: SHIPPED | OUTPATIENT
Start: 2019-11-15 | End: 2020-03-09

## 2020-01-02 DIAGNOSIS — F32.0 MAJOR DEPRESSIVE DISORDER, SINGLE EPISODE, MILD WITH ANXIOUS DISTRESS (HCC): ICD-10-CM

## 2020-01-02 RX ORDER — BUPROPION HYDROCHLORIDE 150 MG/1
150 TABLET ORAL EVERY MORNING
Qty: 30 TABLET | Refills: 2 | Status: SHIPPED | OUTPATIENT
Start: 2020-01-02 | End: 2020-01-28 | Stop reason: SDUPTHER

## 2020-01-13 ENCOUNTER — CLINICAL SUPPORT (OUTPATIENT)
Dept: OBGYN CLINIC | Facility: MEDICAL CENTER | Age: 50
End: 2020-01-13
Payer: COMMERCIAL

## 2020-01-13 VITALS — BODY MASS INDEX: 30.99 KG/M2 | DIASTOLIC BLOOD PRESSURE: 84 MMHG | WEIGHT: 192 LBS | SYSTOLIC BLOOD PRESSURE: 138 MMHG

## 2020-01-13 DIAGNOSIS — Z30.42 ENCOUNTER FOR MANAGEMENT AND INJECTION OF DEPO-PROVERA: ICD-10-CM

## 2020-01-13 PROCEDURE — 96372 THER/PROPH/DIAG INJ SC/IM: CPT | Performed by: STUDENT IN AN ORGANIZED HEALTH CARE EDUCATION/TRAINING PROGRAM

## 2020-01-13 RX ADMIN — MEDROXYPROGESTERONE ACETATE 150 MG: 150 INJECTION, SUSPENSION INTRAMUSCULAR at 16:09

## 2020-01-13 NOTE — PROGRESS NOTES
Pt scheduled for depo, last injection given 98/10/24 LD, no complications noted  Yearly exam done with Dr Chavez Cooney 7/3/19  Pt denies concerns or problems with depo, injection given IM in left deltoid, tolerated well

## 2020-01-28 ENCOUNTER — OFFICE VISIT (OUTPATIENT)
Dept: FAMILY MEDICINE CLINIC | Facility: CLINIC | Age: 50
End: 2020-01-28
Payer: COMMERCIAL

## 2020-01-28 VITALS
OXYGEN SATURATION: 94 % | TEMPERATURE: 97.8 F | DIASTOLIC BLOOD PRESSURE: 82 MMHG | WEIGHT: 195 LBS | HEART RATE: 90 BPM | SYSTOLIC BLOOD PRESSURE: 120 MMHG | HEIGHT: 66 IN | BODY MASS INDEX: 31.34 KG/M2

## 2020-01-28 DIAGNOSIS — I10 BENIGN ESSENTIAL HYPERTENSION: ICD-10-CM

## 2020-01-28 DIAGNOSIS — Z13.0 SCREENING FOR DEFICIENCY ANEMIA: ICD-10-CM

## 2020-01-28 DIAGNOSIS — E03.9 ACQUIRED HYPOTHYROIDISM: ICD-10-CM

## 2020-01-28 DIAGNOSIS — F32.0 MAJOR DEPRESSIVE DISORDER, SINGLE EPISODE, MILD WITH ANXIOUS DISTRESS (HCC): Primary | ICD-10-CM

## 2020-01-28 DIAGNOSIS — F41.1 GENERALIZED ANXIETY DISORDER: ICD-10-CM

## 2020-01-28 DIAGNOSIS — E78.1 HYPERTRIGLYCERIDEMIA WITHOUT HYPERCHOLESTEROLEMIA: ICD-10-CM

## 2020-01-28 PROBLEM — T78.40XA ALLERGIC REACTION: Status: RESOLVED | Noted: 2019-10-21 | Resolved: 2020-01-28

## 2020-01-28 PROCEDURE — 99213 OFFICE O/P EST LOW 20 MIN: CPT | Performed by: PHYSICIAN ASSISTANT

## 2020-01-28 PROCEDURE — 1036F TOBACCO NON-USER: CPT | Performed by: PHYSICIAN ASSISTANT

## 2020-01-28 PROCEDURE — 3079F DIAST BP 80-89 MM HG: CPT | Performed by: PHYSICIAN ASSISTANT

## 2020-01-28 PROCEDURE — 3008F BODY MASS INDEX DOCD: CPT | Performed by: PHYSICIAN ASSISTANT

## 2020-01-28 PROCEDURE — 3074F SYST BP LT 130 MM HG: CPT | Performed by: PHYSICIAN ASSISTANT

## 2020-01-28 RX ORDER — BUSPIRONE HYDROCHLORIDE 5 MG/1
5 TABLET ORAL 2 TIMES DAILY
Qty: 60 TABLET | Refills: 5 | Status: SHIPPED | OUTPATIENT
Start: 2020-01-28 | End: 2020-07-27 | Stop reason: SDUPTHER

## 2020-01-28 RX ORDER — BUPROPION HYDROCHLORIDE 150 MG/1
150 TABLET ORAL EVERY MORNING
Qty: 30 TABLET | Refills: 5 | Status: SHIPPED | OUTPATIENT
Start: 2020-01-28 | End: 2020-03-25

## 2020-01-28 NOTE — PROGRESS NOTES
Assessment/Plan:  BMI Counseling: Body mass index is 31 47 kg/m²  The BMI is above normal  Nutrition recommendations include decreasing portion sizes, encouraging healthy choices of fruits and vegetables, consuming healthier snacks, limiting drinks that contain sugar, moderation in carbohydrate intake, increasing intake of lean protein, reducing intake of saturated and trans fat and reducing intake of cholesterol  Exercise recommendations include moderate physical activity 150 minutes/week  No pharmacotherapy was ordered  Diagnoses and all orders for this visit:    Major depressive disorder, single episode, mild with anxious distress (HCC)  -     buPROPion (WELLBUTRIN XL) 150 mg 24 hr tablet; Take 1 tablet (150 mg total) by mouth every morning    Generalized anxiety disorder  -     busPIRone (BUSPAR) 5 mg tablet; Take 1 tablet (5 mg total) by mouth 2 (two) times a day    Acquired hypothyroidism  -     TSH, 3rd generation with Free T4 reflex; Future    Benign essential hypertension  -     Comprehensive metabolic panel; Future    Hypertriglyceridemia without hypercholesterolemia  -     Comprehensive metabolic panel; Future  -     Lipid Panel with Direct LDL reflex; Future    Screening for deficiency anemia  -     CBC and differential; Future            Subjective:      Patient ID: Mayra Burleson is a 51 y o  female  Patient is here for follow-up of her anxiety and depression  She states with the current medication she is on this is the best she has felt mentally in a long time  She still having vertigo and eustachian tube problems  She is seeing an ENT and he is doing sinus surgery on her tomorrow  Anxiety   Presents for follow-up visit  Symptoms include decreased concentration, dizziness, excessive worry, insomnia, irritability, nervous/anxious behavior, palpitations, panic and restlessness   Patient reports no chest pain, depressed mood, hyperventilation, malaise, nausea, shortness of breath or suicidal ideas  Symptoms occur rarely  The severity of symptoms is mild  The quality of sleep is good  Nighttime awakenings: occasional      Compliance with medications is %  The following portions of the patient's history were reviewed and updated as appropriate:   She has a past medical history of Disease of thyroid gland and Hypertension  ,  does not have any pertinent problems on file  ,   has a past surgical history that includes  section, low transverse; Cholecystectomy; Tooth extraction; and [facility] (2019)  ,  family history is not on file  ,   reports that she has never smoked  She has never used smokeless tobacco  She reports that she does not drink alcohol or use drugs  ,  is allergic to albumen, egg; pollen extract; and shrimp extract allergy skin test   Current Outpatient Medications   Medication Sig Dispense Refill    buPROPion (WELLBUTRIN XL) 150 mg 24 hr tablet Take 1 tablet (150 mg total) by mouth every morning 30 tablet 5    busPIRone (BUSPAR) 5 mg tablet Take 1 tablet (5 mg total) by mouth 2 (two) times a day 60 tablet 5    fluticasone (FLONASE) 50 mcg/act nasal spray SPRAY 2 SPRAYS INTO EACH NOSTRIL EVERY DAY 16 mL 1    hydrochlorothiazide (HYDRODIURIL) 25 mg tablet TAKE 1 TABLET BY MOUTH EVERY DAY IN THE MORNING 90 tablet 1    levothyroxine 150 mcg tablet Take 1 tablet (150 mcg total) by mouth daily 30 tablet 3    medroxyPROGESTERone (DEPO-PROVERA) 150 mg/mL injection Inject 1 mL (150 mg total) into a muscle every 3 (three) months 1 mL 3    montelukast (SINGULAIR) 10 mg tablet       Omega-3 Fatty Acids ([employer] NATURAL FISH OIL) 1000 MG CAPS Take by mouth       Current Facility-Administered Medications   Medication Dose Route Frequency Provider Last Rate Last Dose    medroxyPROGESTERone (DEPO-PROVERA) IM injection 150 mg  150 mg Intramuscular Q3 Months Taqueria Hutchins MD   150 mg at 20 1609       Review of Systems   Constitutional: Positive for irritability  Negative for activity change, appetite change and fatigue  HENT: Positive for congestion, ear pain, hearing loss and sinus pressure  Negative for trouble swallowing  Eyes: Negative for pain  Respiratory: Negative for cough, chest tightness and shortness of breath  Cardiovascular: Positive for palpitations  Negative for chest pain  Gastrointestinal: Negative for abdominal pain, constipation, diarrhea and nausea  Genitourinary: Negative for difficulty urinating and dysuria  Musculoskeletal: Negative for arthralgias, back pain, gait problem, neck pain and neck stiffness  Neurological: Positive for dizziness  Negative for syncope and light-headedness  Psychiatric/Behavioral: Positive for decreased concentration  Negative for suicidal ideas  The patient is nervous/anxious and has insomnia  Objective:  Vitals:    01/28/20 0811   BP: 120/82   Pulse: 90   Temp: 97 8 °F (36 6 °C)   SpO2: 94%   Weight: 88 5 kg (195 lb)   Height: 5' 6" (1 676 m)     Body mass index is 31 47 kg/m²  Physical Exam   Constitutional: She is oriented to person, place, and time  She appears well-developed and well-nourished  HENT:   Head: Normocephalic  Eyes: Conjunctivae are normal    Neck: Normal range of motion  Cardiovascular: Normal rate, regular rhythm and normal heart sounds  Pulmonary/Chest: Effort normal and breath sounds normal    Musculoskeletal: Normal range of motion  Neurological: She is alert and oriented to person, place, and time  Skin: Skin is warm and dry  Psychiatric: She has a normal mood and affect  Her behavior is normal  Judgment and thought content normal    Nursing note and vitals reviewed

## 2020-03-06 ENCOUNTER — HOSPITAL ENCOUNTER (EMERGENCY)
Facility: HOSPITAL | Age: 50
Discharge: HOME | End: 2020-03-06
Attending: EMERGENCY MEDICINE
Payer: COMMERCIAL

## 2020-03-06 VITALS
OXYGEN SATURATION: 98 % | WEIGHT: 192 LBS | DIASTOLIC BLOOD PRESSURE: 83 MMHG | RESPIRATION RATE: 19 BRPM | HEART RATE: 96 BPM | TEMPERATURE: 98.9 F | BODY MASS INDEX: 30.99 KG/M2 | SYSTOLIC BLOOD PRESSURE: 158 MMHG

## 2020-03-06 DIAGNOSIS — S61.211A LACERATION OF LEFT INDEX FINGER WITHOUT FOREIGN BODY, NAIL DAMAGE STATUS UNSPECIFIED, INITIAL ENCOUNTER: Primary | ICD-10-CM

## 2020-03-06 PROCEDURE — 12001 RPR S/N/AX/GEN/TRNK 2.5CM/<: CPT | Performed by: EMERGENCY MEDICINE

## 2020-03-06 PROCEDURE — 99282 EMERGENCY DEPT VISIT SF MDM: CPT

## 2020-03-06 PROCEDURE — 99282 EMERGENCY DEPT VISIT SF MDM: CPT | Performed by: EMERGENCY MEDICINE

## 2020-03-07 NOTE — ED PROVIDER NOTES
History  Chief Complaint   Patient presents with    Laceration     Pt presents to the ED with a laceration to the webbing between her pointer and middle finger of her left hand after accidentally cutting with a knife while attempting get the pit out of an avocado     Sudden onset pain in finger of L nondominant hand after pt cut finger w knife while trying to remove pit from avocado  Tetanus utd  Bleeding resolved  No sensory or motor deficit  Not anticoagulated  No other sxs or concerns  Prior to Admission Medications   Prescriptions Last Dose Informant Patient Reported? Taking? Omega-3 Fatty Acids ([employer] NATURAL FISH OIL) 1000 MG CAPS  Self Yes No   Sig: Take by mouth   buPROPion (WELLBUTRIN XL) 150 mg 24 hr tablet   No No   Sig: Take 1 tablet (150 mg total) by mouth every morning   busPIRone (BUSPAR) 5 mg tablet   No No   Sig: Take 1 tablet (5 mg total) by mouth 2 (two) times a day   fluticasone (FLONASE) 50 mcg/act nasal spray   No No   Sig: SPRAY 2 SPRAYS INTO EACH NOSTRIL EVERY DAY   hydrochlorothiazide (HYDRODIURIL) 25 mg tablet   No No   Sig: TAKE 1 TABLET BY MOUTH EVERY DAY IN THE MORNING   levothyroxine 150 mcg tablet   No No   Sig: Take 1 tablet (150 mcg total) by mouth daily   medroxyPROGESTERone (DEPO-PROVERA) 150 mg/mL injection  Self No No   Sig: Inject 1 mL (150 mg total) into a muscle every 3 (three) months   montelukast (SINGULAIR) 10 mg tablet   Yes No      Facility-Administered Medications Last Administration Doses Remaining   medroxyPROGESTERone (DEPO-PROVERA) IM injection 150 mg 2020  4:09 PM           Past Medical History:   Diagnosis Date    Disease of thyroid gland     Hypertension        Past Surgical History:   Procedure Laterality Date     SECTION, LOW TRANSVERSE      Last Assessed: 5/15/2015    CHOLECYSTECTOMY      Last Assessed: 5/15/2015     [facility]  2019    TOOTH EXTRACTION      Last Assessed: 5/15/2015       History reviewed   No pertinent family history  I have reviewed and agree with the history as documented  E-Cigarette/Vaping     E-Cigarette/Vaping Substances     Social History     Tobacco Use    Smoking status: Never Smoker    Smokeless tobacco: Never Used   Substance Use Topics    Alcohol use: Never     Frequency: Never    Drug use: Never       Review of Systems   Skin: Positive for wound  All other systems reviewed and are negative  Physical Exam  Physical Exam   Constitutional: She is oriented to person, place, and time  She appears well-developed and well-nourished  No distress  HENT:   Head: Normocephalic and atraumatic  Eyes: Pupils are equal, round, and reactive to light  Conjunctivae and EOM are normal  Right eye exhibits no discharge  Left eye exhibits no discharge  Neck: Normal range of motion  Neck supple  No JVD present  Pulmonary/Chest: No stridor  No respiratory distress  Musculoskeletal: Normal range of motion  She exhibits no edema, tenderness or deformity  1cm linear laceration between L index and middle fingers  Normal finger stance and ROM of all fingers  Bleeding resolved  No tendon injury  No FB, wound explored to base  Neurological: She is alert and oriented to person, place, and time  No cranial nerve deficit or sensory deficit  She exhibits normal muscle tone  Coordination normal    Skin: Skin is warm and dry  Capillary refill takes less than 2 seconds  She is not diaphoretic  Nursing note and vitals reviewed        Vital Signs  ED Triage Vitals [03/06/20 2004]   Temperature Pulse Respirations Blood Pressure SpO2   98 9 °F (37 2 °C) 96 19 158/83 98 %      Temp Source Heart Rate Source Patient Position - Orthostatic VS BP Location FiO2 (%)   Oral Monitor -- Right arm --      Pain Score       7           Vitals:    03/06/20 2004   BP: 158/83   Pulse: 96         Visual Acuity      ED Medications  Medications - No data to display    Diagnostic Studies  Results Reviewed     None                 No orders to display              Procedures  Laceration repair  Date/Time: 3/6/2020 9:09 PM  Performed by: Debbi Gonsales MD  Authorized by: Debbi Gonsales MD   Consent: Verbal consent obtained  Risks and benefits: risks, benefits and alternatives were discussed  Consent given by: patient  Body area: upper extremity  Location details: left index finger  Laceration length: 1 cm  Foreign bodies: no foreign bodies  Anesthesia: local infiltration    Anesthesia:  Local Anesthetic: lidocaine 1% with epinephrine    Wound Dehiscence:  Superficial Wound Dehiscence: simple closure      Procedure Details:  Irrigation solution: tap water  Skin closure: 5-0 nylon  Number of sutures: 3  Technique: simple  Approximation: close  Approximation difficulty: simple  Patient tolerance: Patient tolerated the procedure well with no immediate complications               ED Course                               MDM      Disposition  Final diagnoses:   Laceration of left index finger without foreign body, nail damage status unspecified, initial encounter     Time reflects when diagnosis was documented in both MDM as applicable and the Disposition within this note     Time User Action Codes Description Comment    3/6/2020  8:53 PM Umm Newman Add [R81 [id number]] Laceration of left index finger without foreign body, nail damage status unspecified, initial encounter       ED Disposition     ED Disposition Condition Date/Time Comment    Discharge Stable Fri Mar 6, 2020  8:53 PM Herve Damian discharge to home/self care  Follow-up Information    None         Patient's Medications   Discharge Prescriptions    No medications on file     No discharge procedures on file      PDMP Review     None          ED Provider  Electronically Signed by           Debbi Gonsales MD  03/06/20 9419

## 2020-03-09 DIAGNOSIS — E03.9 HYPOTHYROIDISM, ADULT: ICD-10-CM

## 2020-03-09 RX ORDER — LEVOTHYROXINE SODIUM 0.15 MG/1
TABLET ORAL
Qty: 30 TABLET | Refills: 3 | Status: SHIPPED | OUTPATIENT
Start: 2020-03-09 | End: 2020-07-01

## 2020-03-24 DIAGNOSIS — F32.0 MAJOR DEPRESSIVE DISORDER, SINGLE EPISODE, MILD WITH ANXIOUS DISTRESS (HCC): ICD-10-CM

## 2020-03-24 DIAGNOSIS — I10 HYPERTENSION, ESSENTIAL: ICD-10-CM

## 2020-03-25 RX ORDER — BUPROPION HYDROCHLORIDE 150 MG/1
TABLET ORAL
Qty: 30 TABLET | Refills: 5 | Status: SHIPPED | OUTPATIENT
Start: 2020-03-25 | End: 2020-07-27 | Stop reason: SDUPTHER

## 2020-03-25 RX ORDER — HYDROCHLOROTHIAZIDE 25 MG/1
TABLET ORAL
Qty: 90 TABLET | Refills: 1 | Status: SHIPPED | OUTPATIENT
Start: 2020-03-25 | End: 2020-07-27 | Stop reason: SDUPTHER

## 2020-03-30 DIAGNOSIS — Z00.00 HEALTH CARE MAINTENANCE: ICD-10-CM

## 2020-03-30 RX ORDER — MEDROXYPROGESTERONE ACETATE 150 MG/ML
150 INJECTION, SUSPENSION INTRAMUSCULAR
Qty: 1 ML | Refills: 1 | Status: SHIPPED | OUTPATIENT
Start: 2020-03-30 | End: 2020-08-25 | Stop reason: SDUPTHER

## 2020-03-31 ENCOUNTER — CLINICAL SUPPORT (OUTPATIENT)
Dept: OBGYN CLINIC | Facility: MEDICAL CENTER | Age: 50
End: 2020-03-31
Payer: COMMERCIAL

## 2020-03-31 VITALS — SYSTOLIC BLOOD PRESSURE: 146 MMHG | BODY MASS INDEX: 31.57 KG/M2 | DIASTOLIC BLOOD PRESSURE: 96 MMHG | WEIGHT: 195.6 LBS

## 2020-03-31 DIAGNOSIS — Z30.42 ENCOUNTER FOR DEPO-PROVERA CONTRACEPTION: Primary | ICD-10-CM

## 2020-03-31 PROCEDURE — 96372 THER/PROPH/DIAG INJ SC/IM: CPT | Performed by: NURSE PRACTITIONER

## 2020-03-31 RX ORDER — MEDROXYPROGESTERONE ACETATE 150 MG/ML
150 INJECTION, SUSPENSION INTRAMUSCULAR ONCE
Status: COMPLETED | OUTPATIENT
Start: 2020-03-31 | End: 2020-03-31

## 2020-03-31 RX ADMIN — MEDROXYPROGESTERONE ACETATE 150 MG: 150 INJECTION, SUSPENSION INTRAMUSCULAR at 15:32

## 2020-03-31 NOTE — PROGRESS NOTES
Pt is here for Depo Provera injection  Her last dose was 1/13/2020  Her annual exam was on 7/3/19  Urine pregnancy test done: N   Result: N/A  Depo given in Left Deltoid  Tolerated well  Lot [id number] Exp 2/2022  Next dose due June 16 - June 30, 2020

## 2020-06-23 ENCOUNTER — CLINICAL SUPPORT (OUTPATIENT)
Dept: OBGYN CLINIC | Facility: MEDICAL CENTER | Age: 50
End: 2020-06-23
Payer: COMMERCIAL

## 2020-06-23 VITALS — SYSTOLIC BLOOD PRESSURE: 122 MMHG | BODY MASS INDEX: 31.73 KG/M2 | WEIGHT: 196.6 LBS | DIASTOLIC BLOOD PRESSURE: 90 MMHG

## 2020-06-23 DIAGNOSIS — Z30.42 ENCOUNTER FOR DEPO-PROVERA CONTRACEPTION: Primary | ICD-10-CM

## 2020-06-23 PROCEDURE — 96372 THER/PROPH/DIAG INJ SC/IM: CPT | Performed by: NURSE PRACTITIONER

## 2020-06-23 RX ORDER — MEDROXYPROGESTERONE ACETATE 150 MG/ML
150 INJECTION, SUSPENSION INTRAMUSCULAR ONCE
Status: COMPLETED | OUTPATIENT
Start: 2020-06-23 | End: 2020-06-23

## 2020-06-23 RX ADMIN — MEDROXYPROGESTERONE ACETATE 150 MG: 150 INJECTION, SUSPENSION INTRAMUSCULAR at 17:23

## 2020-07-01 DIAGNOSIS — E03.9 HYPOTHYROIDISM, ADULT: ICD-10-CM

## 2020-07-01 RX ORDER — LEVOTHYROXINE SODIUM 0.15 MG/1
TABLET ORAL
Qty: 30 TABLET | Refills: 3 | Status: SHIPPED | OUTPATIENT
Start: 2020-07-01 | End: 2020-07-27 | Stop reason: SDUPTHER

## 2020-07-16 ENCOUNTER — TELEPHONE (OUTPATIENT)
Dept: BARIATRICS | Facility: CLINIC | Age: 50
End: 2020-07-16

## 2020-07-16 NOTE — TELEPHONE ENCOUNTER
Patient registered for the online info [facility] for weight loss surgery  Pt's BMI is currently 31 7 which does not qualify her for bariatric surgery    LM to the above and encouraged patient to call back and schedule an appointment with our [facility] program

## 2020-07-27 ENCOUNTER — OFFICE VISIT (OUTPATIENT)
Dept: FAMILY MEDICINE CLINIC | Facility: CLINIC | Age: 50
End: 2020-07-27
Payer: COMMERCIAL

## 2020-07-27 VITALS
SYSTOLIC BLOOD PRESSURE: 110 MMHG | TEMPERATURE: 98.9 F | DIASTOLIC BLOOD PRESSURE: 76 MMHG | BODY MASS INDEX: 31.15 KG/M2 | WEIGHT: 193 LBS | OXYGEN SATURATION: 98 % | HEART RATE: 78 BPM

## 2020-07-27 DIAGNOSIS — E03.9 ACQUIRED HYPOTHYROIDISM: Primary | ICD-10-CM

## 2020-07-27 DIAGNOSIS — E78.1 HYPERTRIGLYCERIDEMIA WITHOUT HYPERCHOLESTEROLEMIA: ICD-10-CM

## 2020-07-27 DIAGNOSIS — I10 HYPERTENSION, ESSENTIAL: ICD-10-CM

## 2020-07-27 DIAGNOSIS — F41.1 GENERALIZED ANXIETY DISORDER: ICD-10-CM

## 2020-07-27 DIAGNOSIS — E03.9 HYPOTHYROIDISM, ADULT: ICD-10-CM

## 2020-07-27 DIAGNOSIS — F32.0 MAJOR DEPRESSIVE DISORDER, SINGLE EPISODE, MILD WITH ANXIOUS DISTRESS (HCC): ICD-10-CM

## 2020-07-27 PROCEDURE — 3074F SYST BP LT 130 MM HG: CPT | Performed by: PHYSICIAN ASSISTANT

## 2020-07-27 PROCEDURE — 1036F TOBACCO NON-USER: CPT | Performed by: PHYSICIAN ASSISTANT

## 2020-07-27 PROCEDURE — 3078F DIAST BP <80 MM HG: CPT | Performed by: PHYSICIAN ASSISTANT

## 2020-07-27 PROCEDURE — 99214 OFFICE O/P EST MOD 30 MIN: CPT | Performed by: PHYSICIAN ASSISTANT

## 2020-07-27 RX ORDER — HYDROCHLOROTHIAZIDE 25 MG/1
25 TABLET ORAL EVERY MORNING
Qty: 90 TABLET | Refills: 1 | Status: SHIPPED | OUTPATIENT
Start: 2020-07-27 | End: 2021-03-01

## 2020-07-27 RX ORDER — BUPROPION HYDROCHLORIDE 150 MG/1
150 TABLET ORAL EVERY MORNING
Qty: 30 TABLET | Refills: 5 | Status: SHIPPED | OUTPATIENT
Start: 2020-07-27 | End: 2021-03-08

## 2020-07-27 RX ORDER — BUSPIRONE HYDROCHLORIDE 5 MG/1
5 TABLET ORAL 2 TIMES DAILY
Qty: 60 TABLET | Refills: 5 | Status: SHIPPED | OUTPATIENT
Start: 2020-07-27 | End: 2021-03-08

## 2020-07-27 RX ORDER — LEVOTHYROXINE SODIUM 0.15 MG/1
150 TABLET ORAL DAILY
Qty: 30 TABLET | Refills: 3 | Status: SHIPPED | OUTPATIENT
Start: 2020-07-27 | End: 2020-10-26

## 2020-07-27 NOTE — PROGRESS NOTES
Assessment/Plan:          Diagnoses and all orders for this visit:    Acquired hypothyroidism    Hypertriglyceridemia without hypercholesterolemia    Major depressive disorder, single episode, mild with anxious distress (HCC)  -     buPROPion (WELLBUTRIN XL) 150 mg 24 hr tablet; Take 1 tablet (150 mg total) by mouth every morning    Generalized anxiety disorder  -     busPIRone (BUSPAR) 5 mg tablet; Take 1 tablet (5 mg total) by mouth 2 (two) times a day    Hypothyroidism, adult  -     levothyroxine 150 mcg tablet; Take 1 tablet (150 mcg total) by mouth daily    Hypertension, essential  -     hydrochlorothiazide (HYDRODIURIL) 25 mg tablet; Take 1 tablet (25 mg total) by mouth every morning        Labs are in chart and patient will get those  She is to follow-up in September for her annual gyn exam   She will be due for her Depo-Provera at that time  Patient declines to do a mammogram     Subjective:      Patient ID: Brooke Erazo is a 51 y o  female  Hypertension   This is a chronic problem  The current episode started more than 1 year ago  The problem is controlled  Associated symptoms include anxiety  Pertinent negatives include no blurred vision, chest pain, headaches, malaise/fatigue, palpitations, peripheral edema or shortness of breath  Agents associated with hypertension include thyroid hormones  Risk factors for coronary artery disease include dyslipidemia and obesity  Past treatments include diuretics  The current treatment provides significant improvement  There are no compliance problems  Identifiable causes of hypertension include a thyroid problem  Thyroid Problem   Presents for follow-up visit  Symptoms include anxiety and diarrhea  Patient reports no cold intolerance, constipation, depressed mood, fatigue, hair loss, menstrual problem or palpitations  The symptoms have been stable  Depression   This is a chronic problem  The current episode started more than 1 month ago   The problem occurs intermittently  The problem has been rapidly improving  Pertinent negatives include no abdominal pain, anorexia, chest pain, chills, congestion, coughing, fatigue, headaches, myalgias, nausea or sore throat  The symptoms are aggravated by stress  Treatments tried: buproprion  The treatment provided significant relief  Anxiety   Presents for follow-up visit  Symptoms include nervous/anxious behavior  Patient reports no chest pain, confusion, decreased concentration, depressed mood, dizziness, excessive worry, insomnia, irritability, nausea, obsessions, palpitations, panic, restlessness or shortness of breath  Symptoms occur rarely  The severity of symptoms is mild  The quality of sleep is good  Nighttime awakenings: occasional      Compliance with medications is %  The following portions of the patient's history were reviewed and updated as appropriate:   She has a past medical history of Disease of thyroid gland and Hypertension  ,  does not have any pertinent problems on file  ,   has a past surgical history that includes  section, low transverse; Cholecystectomy; Tooth extraction; and [facility] (2019)  ,  family history is not on file  ,   reports that she has never smoked  She has never used smokeless tobacco  She reports that she drinks alcohol  She reports that she does not use drugs  ,  is allergic to pollen extract and shrimp extract allergy skin test   Current Outpatient Medications   Medication Sig Dispense Refill    buPROPion (WELLBUTRIN XL) 150 mg 24 hr tablet Take 1 tablet (150 mg total) by mouth every morning 30 tablet 5    busPIRone (BUSPAR) 5 mg tablet Take 1 tablet (5 mg total) by mouth 2 (two) times a day 60 tablet 5    hydrochlorothiazide (HYDRODIURIL) 25 mg tablet Take 1 tablet (25 mg total) by mouth every morning 90 tablet 1    levothyroxine 150 mcg tablet Take 1 tablet (150 mcg total) by mouth daily 30 tablet 3    medroxyPROGESTERone (DEPO-PROVERA) 150 mg/mL injection Inject 1 mL (150 mg total) into a muscle every 3 (three) months 1 mL 1    Omega-3 Fatty Acids ([employer] NATURAL FISH OIL) 1000 MG CAPS Take by mouth      fluticasone (FLONASE) 50 mcg/act nasal spray SPRAY 2 SPRAYS INTO EACH NOSTRIL EVERY DAY 16 mL 1    montelukast (SINGULAIR) 10 mg tablet        Current Facility-Administered Medications   Medication Dose Route Frequency Provider Last Rate Last Dose    medroxyPROGESTERone (DEPO-PROVERA) IM injection 150 mg  150 mg Intramuscular Q3 Months Viry Abel MD   150 mg at 01/13/20 1609       Review of Systems   Constitutional: Negative for activity change, appetite change, chills, fatigue, irritability and malaise/fatigue  HENT: Negative for congestion, ear pain, postnasal drip, sore throat and trouble swallowing  Eyes: Negative for blurred vision and pain  Respiratory: Negative for cough, chest tightness and shortness of breath  Cardiovascular: Negative for chest pain and palpitations  Gastrointestinal: Positive for diarrhea  Negative for abdominal pain, anorexia, constipation and nausea  Endocrine: Negative for cold intolerance  Genitourinary: Negative for dysuria and menstrual problem  Musculoskeletal: Negative for back pain, gait problem and myalgias  Neurological: Negative for dizziness, light-headedness and headaches  Psychiatric/Behavioral: Positive for depression  Negative for confusion, decreased concentration and sleep disturbance  The patient is nervous/anxious  The patient does not have insomnia  Objective:  Vitals:    07/27/20 0758   BP: 110/76   Pulse: 78   Temp: 98 9 °F (37 2 °C)   SpO2: 98%   Weight: 87 5 kg (193 lb)     Body mass index is 31 15 kg/m²  Physical Exam   Constitutional: She is oriented to person, place, and time  She appears well-developed and well-nourished  HENT:   Head: Normocephalic     Right Ear: External ear normal    Left Ear: External ear normal    Eyes: Conjunctivae are normal    Neck: Normal range of motion  Carotid bruit is not present  No thyromegaly present  Cardiovascular: Normal rate, regular rhythm, normal heart sounds and intact distal pulses  Pulmonary/Chest: Effort normal and breath sounds normal    Abdominal: Soft  Normal appearance and bowel sounds are normal  There is no hepatosplenomegaly  There is no tenderness  There is no CVA tenderness  Musculoskeletal: Normal range of motion  She exhibits no edema or tenderness  Lymphadenopathy:     She has no cervical adenopathy  Neurological: She is alert and oriented to person, place, and time  She exhibits normal muscle tone  Coordination normal    Skin: Skin is warm and dry  Psychiatric: She has a normal mood and affect  Her behavior is normal  Judgment and thought content normal    Nursing note and vitals reviewed

## 2020-08-25 DIAGNOSIS — Z00.00 HEALTH CARE MAINTENANCE: ICD-10-CM

## 2020-08-25 RX ORDER — MEDROXYPROGESTERONE ACETATE 150 MG/ML
150 INJECTION, SUSPENSION INTRAMUSCULAR
Qty: 1 ML | Refills: 1 | Status: SHIPPED | OUTPATIENT
Start: 2020-08-25 | End: 2021-03-01

## 2020-08-26 ENCOUNTER — APPOINTMENT (OUTPATIENT)
Dept: LAB | Facility: HOSPITAL | Age: 50
End: 2020-08-26
Payer: COMMERCIAL

## 2020-08-26 ENCOUNTER — TELEPHONE (OUTPATIENT)
Dept: FAMILY MEDICINE CLINIC | Facility: CLINIC | Age: 50
End: 2020-08-26

## 2020-08-26 DIAGNOSIS — I10 BENIGN ESSENTIAL HYPERTENSION: ICD-10-CM

## 2020-08-26 DIAGNOSIS — Z13.0 SCREENING FOR DEFICIENCY ANEMIA: ICD-10-CM

## 2020-08-26 DIAGNOSIS — E03.9 ACQUIRED HYPOTHYROIDISM: ICD-10-CM

## 2020-08-26 DIAGNOSIS — E78.1 HYPERTRIGLYCERIDEMIA WITHOUT HYPERCHOLESTEROLEMIA: ICD-10-CM

## 2020-08-26 LAB
ALBUMIN SERPL BCP-MCNC: 4 G/DL (ref 3.5–5)
ALP SERPL-CCNC: 46 U/L (ref 46–116)
ALT SERPL W P-5'-P-CCNC: 55 U/L (ref 12–78)
ANION GAP SERPL CALCULATED.3IONS-SCNC: 8 MMOL/L (ref 4–13)
AST SERPL W P-5'-P-CCNC: 26 U/L (ref 5–45)
BASOPHILS # BLD AUTO: 0.07 THOUSANDS/ΜL (ref 0–0.1)
BASOPHILS NFR BLD AUTO: 1 % (ref 0–1)
BILIRUB SERPL-MCNC: 1.1 MG/DL (ref 0.2–1)
BUN SERPL-MCNC: 19 MG/DL (ref 5–25)
CALCIUM SERPL-MCNC: 8.8 MG/DL (ref 8.3–10.1)
CHLORIDE SERPL-SCNC: 103 MMOL/L (ref 100–108)
CHOLEST SERPL-MCNC: 167 MG/DL (ref 50–200)
CO2 SERPL-SCNC: 28 MMOL/L (ref 21–32)
CREAT SERPL-MCNC: 1.02 MG/DL (ref 0.6–1.3)
EOSINOPHIL # BLD AUTO: 0.12 THOUSAND/ΜL (ref 0–0.61)
EOSINOPHIL NFR BLD AUTO: 2 % (ref 0–6)
ERYTHROCYTE [DISTWIDTH] IN BLOOD BY AUTOMATED COUNT: 12.8 % (ref 11.6–15.1)
GFR SERPL CREATININE-BSD FRML MDRD: 65 ML/MIN/1.73SQ M
GLUCOSE P FAST SERPL-MCNC: 89 MG/DL (ref 65–99)
HCT VFR BLD AUTO: 45.9 % (ref 34.8–46.1)
HDLC SERPL-MCNC: 46 MG/DL
HGB BLD-MCNC: 15.2 G/DL (ref 11.5–15.4)
IMM GRANULOCYTES # BLD AUTO: 0.06 THOUSAND/UL (ref 0–0.2)
IMM GRANULOCYTES NFR BLD AUTO: 1 % (ref 0–2)
LDLC SERPL CALC-MCNC: 106 MG/DL (ref 0–100)
LYMPHOCYTES # BLD AUTO: 2.7 THOUSANDS/ΜL (ref 0.6–4.47)
LYMPHOCYTES NFR BLD AUTO: 39 % (ref 14–44)
MCH RBC QN AUTO: 31.1 PG (ref 26.8–34.3)
MCHC RBC AUTO-ENTMCNC: 33.1 G/DL (ref 31.4–37.4)
MCV RBC AUTO: 94 FL (ref 82–98)
MONOCYTES # BLD AUTO: 0.56 THOUSAND/ΜL (ref 0.17–1.22)
MONOCYTES NFR BLD AUTO: 8 % (ref 4–12)
NEUTROPHILS # BLD AUTO: 3.49 THOUSANDS/ΜL (ref 1.85–7.62)
NEUTS SEG NFR BLD AUTO: 49 % (ref 43–75)
NRBC BLD AUTO-RTO: 0 /100 WBCS
PLATELET # BLD AUTO: 219 THOUSANDS/UL (ref 149–390)
PMV BLD AUTO: 11.2 FL (ref 8.9–12.7)
POTASSIUM SERPL-SCNC: 3.9 MMOL/L (ref 3.5–5.3)
PROT SERPL-MCNC: 7.3 G/DL (ref 6.4–8.2)
RBC # BLD AUTO: 4.89 MILLION/UL (ref 3.81–5.12)
SODIUM SERPL-SCNC: 139 MMOL/L (ref 136–145)
TRIGL SERPL-MCNC: 76 MG/DL
TSH SERPL DL<=0.05 MIU/L-ACNC: 1.93 UIU/ML (ref 0.36–3.74)
WBC # BLD AUTO: 7 THOUSAND/UL (ref 4.31–10.16)

## 2020-08-26 PROCEDURE — 84443 ASSAY THYROID STIM HORMONE: CPT

## 2020-08-26 PROCEDURE — 85025 COMPLETE CBC W/AUTO DIFF WBC: CPT

## 2020-08-26 PROCEDURE — 80053 COMPREHEN METABOLIC PANEL: CPT

## 2020-08-26 PROCEDURE — 36415 COLL VENOUS BLD VENIPUNCTURE: CPT

## 2020-08-26 PROCEDURE — 80061 LIPID PANEL: CPT

## 2020-08-26 NOTE — TELEPHONE ENCOUNTER
----- Message from Ronen Dunlap PA-C sent at 8/26/2020 10:07 AM EDT -----  Please let patient know labs are normal

## 2020-09-04 ENCOUNTER — ANNUAL EXAM (OUTPATIENT)
Dept: FAMILY MEDICINE CLINIC | Facility: CLINIC | Age: 50
End: 2020-09-04
Payer: COMMERCIAL

## 2020-09-04 VITALS
TEMPERATURE: 98 F | DIASTOLIC BLOOD PRESSURE: 72 MMHG | OXYGEN SATURATION: 97 % | HEIGHT: 68 IN | WEIGHT: 197 LBS | HEART RATE: 84 BPM | SYSTOLIC BLOOD PRESSURE: 108 MMHG | BODY MASS INDEX: 29.86 KG/M2

## 2020-09-04 DIAGNOSIS — E03.9 ACQUIRED HYPOTHYROIDISM: ICD-10-CM

## 2020-09-04 DIAGNOSIS — Z12.4 SCREENING FOR CERVICAL CANCER: ICD-10-CM

## 2020-09-04 DIAGNOSIS — Z01.419 ENCOUNTER FOR ANNUAL ROUTINE GYNECOLOGICAL EXAMINATION: Primary | ICD-10-CM

## 2020-09-04 DIAGNOSIS — I10 BENIGN ESSENTIAL HYPERTENSION: ICD-10-CM

## 2020-09-04 PROCEDURE — G0145 SCR C/V CYTO,THINLAYER,RESCR: HCPCS | Performed by: FAMILY MEDICINE

## 2020-09-04 PROCEDURE — 3078F DIAST BP <80 MM HG: CPT | Performed by: FAMILY MEDICINE

## 2020-09-04 PROCEDURE — 3725F SCREEN DEPRESSION PERFORMED: CPT | Performed by: FAMILY MEDICINE

## 2020-09-04 PROCEDURE — 1036F TOBACCO NON-USER: CPT | Performed by: FAMILY MEDICINE

## 2020-09-04 PROCEDURE — 87624 HPV HI-RISK TYP POOLED RSLT: CPT | Performed by: FAMILY MEDICINE

## 2020-09-04 PROCEDURE — 99214 OFFICE O/P EST MOD 30 MIN: CPT | Performed by: FAMILY MEDICINE

## 2020-09-04 NOTE — PROGRESS NOTES
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  Denies abdominal, pelvic pain, problems with her bladder or bowels, vaginal bleeding, vaginal discharge  Screenings / HCM   Vaccines: Indicated today: None  Immunization History   Administered Date(s) Administered    Tdap 03/17/2018      Osteoporosis: No history of pathologic fractures  No steroid use, smoking, risk of falls, excessive alcohol use  Will start at age 73 unless otherwise indicated  Current indication: None  Cervical Cancer screening:     History of Abnormal Pap: none  Last Pap: 1 year ago, WNL per patient  Colon Cancer screening:     Family history of colon cancer: none  Last colonoscopy: N/A  Breast Cancer screening:     Family history of breast cancer: none  Last mammogram: about 5 years ago, refusing to go until 50 years ago  Patient does have a dedicated exercise regimen  Patient does have a balanced diet  Patient does not meet criteria for lung cancer screening  Objective   /72   Pulse 84   Temp 98 °F (36 7 °C)   Ht 5' 7 5" (1 715 m)   Wt 89 4 kg (197 lb)   SpO2 97%   BMI 30 40 kg/m²    General  Well developed, well nourished, normal habitus  Neck  Supple, no thyromegaly  Cardio  Regular rate and rhythm  Lungs  Clear to auscultation bilaterally  Abd  Soft, non-tender, non-distended, no hepatosplenomegaly, no hernia  Breast  Not indicated (USPSTF Guidelines)    External genitalia is normal without lesions  Vagina is pink and vault without discharge  Non-tender cervix, without lesion or discharge  Uterus is mobile, non-tender, and normal in size / shape  Adnexa:  Without masses or tenderness B/L  Assessment / Plan   51 y o  No obstetric history on file  for annual GYN exam    - Pap smear was performed  - Niraj Risk HPV was performed  - Mammogram was not ordered  - Colonoscopy was not ordered  - DEXA Scan was not ordered  - Contraception: depo  - Recommend multivitamin, calcium and vitamin D supplementation    - Reviewed lab results , grossly WNL  BP well controlled  No changes in patient's medication management  - Return in 1 year unless otherwise indicated       ANU Barnes    [facility]  9/4/2020 8:16 AM

## 2020-09-12 LAB
LAB AP GYN PRIMARY INTERPRETATION: NORMAL
Lab: NORMAL

## 2020-09-17 ENCOUNTER — CLINICAL SUPPORT (OUTPATIENT)
Dept: FAMILY MEDICINE CLINIC | Facility: CLINIC | Age: 50
End: 2020-09-17
Payer: COMMERCIAL

## 2020-09-17 DIAGNOSIS — Z00.00 HEALTH CARE MAINTENANCE: Primary | ICD-10-CM

## 2020-09-17 RX ADMIN — MEDROXYPROGESTERONE ACETATE 150 MG: 150 INJECTION, SUSPENSION INTRAMUSCULAR at 13:19

## 2020-10-25 DIAGNOSIS — E03.9 HYPOTHYROIDISM, ADULT: ICD-10-CM

## 2020-10-26 RX ORDER — LEVOTHYROXINE SODIUM 0.15 MG/1
TABLET ORAL
Qty: 30 TABLET | Refills: 3 | Status: SHIPPED | OUTPATIENT
Start: 2020-10-26 | End: 2021-02-08

## 2020-12-09 ENCOUNTER — CLINICAL SUPPORT (OUTPATIENT)
Dept: FAMILY MEDICINE CLINIC | Facility: CLINIC | Age: 50
End: 2020-12-09

## 2020-12-09 DIAGNOSIS — Z30.013 ENCOUNTER FOR INITIAL PRESCRIPTION OF INJECTABLE CONTRACEPTIVE: Primary | ICD-10-CM

## 2020-12-09 RX ADMIN — MEDROXYPROGESTERONE ACETATE 150 MG: 150 INJECTION, SUSPENSION INTRAMUSCULAR at 12:59

## 2021-01-20 ENCOUNTER — OFFICE VISIT (OUTPATIENT)
Dept: FAMILY MEDICINE CLINIC | Facility: CLINIC | Age: 51
End: 2021-01-20
Payer: COMMERCIAL

## 2021-01-20 VITALS
HEART RATE: 86 BPM | OXYGEN SATURATION: 99 % | SYSTOLIC BLOOD PRESSURE: 122 MMHG | DIASTOLIC BLOOD PRESSURE: 70 MMHG | HEIGHT: 68 IN | TEMPERATURE: 99.2 F | WEIGHT: 203 LBS | BODY MASS INDEX: 30.77 KG/M2

## 2021-01-20 DIAGNOSIS — L02.02 CARBUNCLE AND FURUNCLE OF FACE: Primary | ICD-10-CM

## 2021-01-20 DIAGNOSIS — L02.03 CARBUNCLE AND FURUNCLE OF FACE: Primary | ICD-10-CM

## 2021-01-20 PROCEDURE — 3725F SCREEN DEPRESSION PERFORMED: CPT | Performed by: FAMILY MEDICINE

## 2021-01-20 PROCEDURE — 3078F DIAST BP <80 MM HG: CPT | Performed by: FAMILY MEDICINE

## 2021-01-20 PROCEDURE — 99213 OFFICE O/P EST LOW 20 MIN: CPT | Performed by: FAMILY MEDICINE

## 2021-01-20 PROCEDURE — 3074F SYST BP LT 130 MM HG: CPT | Performed by: FAMILY MEDICINE

## 2021-01-20 PROCEDURE — 3008F BODY MASS INDEX DOCD: CPT | Performed by: FAMILY MEDICINE

## 2021-01-20 PROCEDURE — 1036F TOBACCO NON-USER: CPT | Performed by: FAMILY MEDICINE

## 2021-01-20 RX ORDER — SULFAMETHOXAZOLE AND TRIMETHOPRIM 800; 160 MG/1; MG/1
1 TABLET ORAL EVERY 12 HOURS SCHEDULED
Qty: 14 TABLET | Refills: 0 | Status: SHIPPED | OUTPATIENT
Start: 2021-01-20 | End: 2021-01-27

## 2021-01-20 NOTE — PROGRESS NOTES
Assessment/Plan:    No problem-specific Assessment & Plan notes found for this encounter  Diagnoses and all orders for this visit:    Carbuncle and furuncle of face  Discussed warm compresses frequently  Patient to start ABX due to surrounding cellulitis  Will follow up in 3 days if no improvement  Consider referral plastics/ENT for I&D if necessary    -     sulfamethoxazole-trimethoprim (BACTRIM DS) 800-160 mg per tablet; Take 1 tablet by mouth every 12 (twelve) hours for 7 days      Subjective:      Patient ID: Albert Vazquez is a 53 y o  female  HPI    Patient presents with concern for facial abscess  States that she developed a 'pimple' about 3 days ago  States that two days ago it was very painful and she tried to pop it which made it worse  She used warm compresses on the area yesterday and noted improvement  Denies fever or chills  Notes some pain in her mouth with     The following portions of the patient's history were reviewed and updated as appropriate: allergies, current medications, past family history, past medical history, past social history, past surgical history and problem list     Review of Systems   Constitutional: Negative for activity change, appetite change, chills, fatigue and fever  HENT: Positive for facial swelling (around lesion)  Negative for congestion, drooling, ear pain, rhinorrhea, sore throat and trouble swallowing  Respiratory: Negative for cough and shortness of breath  Cardiovascular: Negative for chest pain and leg swelling  Gastrointestinal: Negative for abdominal pain, constipation, diarrhea, nausea and vomiting  Skin: Positive for wound  Neurological: Negative for dizziness, light-headedness and headaches  Objective:    /70   Pulse 86   Temp 99 2 °F (37 3 °C)   Ht 5' 7 5" (1 715 m)   Wt 92 1 kg (203 lb)   SpO2 99%   BMI 31 33 kg/m²      Physical Exam  Vitals signs reviewed     Constitutional:       General: She is not in acute distress  Appearance: Normal appearance  HENT:      Head: Normocephalic and atraumatic  Right Ear: External ear normal       Left Ear: External ear normal       Nose: Nose normal       Mouth/Throat:      Mouth: Mucous membranes are moist    Eyes:      Extraocular Movements: Extraocular movements intact  Conjunctiva/sclera: Conjunctivae normal       Pupils: Pupils are equal, round, and reactive to light  Neck:      Musculoskeletal: Neck supple  Cardiovascular:      Rate and Rhythm: Normal rate and regular rhythm  Heart sounds: Normal heart sounds  Pulmonary:      Effort: Pulmonary effort is normal       Breath sounds: Normal breath sounds  No wheezing, rhonchi or rales  Abdominal:      General: Abdomen is flat  Bowel sounds are normal  There is no distension  Palpations: Abdomen is soft  Tenderness: There is no abdominal tenderness  Musculoskeletal:      Right lower leg: No edema  Left lower leg: No edema  Lymphadenopathy:      Cervical: No cervical adenopathy  Skin:     General: Skin is warm  Capillary Refill: Capillary refill takes less than 2 seconds  Findings: No lesion  Comments: Two small 2 mm pustules noted under the right side of the nose, 5 mm area of induration with about 5 mm of surrounding erythema  No tenderness with palpation of the face in surrounding area  Tenderness along erythema and induration  Tenderness with palpation from the inside of the lip  Neurological:      Mental Status: She is alert  Mental status is at baseline             Mariola Lynn DO  [facility] [place]  1/20/2021 11:21 AM

## 2021-01-24 ENCOUNTER — HOSPITAL ENCOUNTER (EMERGENCY)
Facility: HOSPITAL | Age: 51
Discharge: HOME | End: 2021-01-24
Admitting: EMERGENCY MEDICINE
Payer: COMMERCIAL

## 2021-01-24 VITALS
TEMPERATURE: 99 F | HEART RATE: 86 BPM | RESPIRATION RATE: 19 BRPM | DIASTOLIC BLOOD PRESSURE: 67 MMHG | SYSTOLIC BLOOD PRESSURE: 123 MMHG | OXYGEN SATURATION: 99 %

## 2021-01-24 DIAGNOSIS — L02.01 FACIAL ABSCESS: Primary | ICD-10-CM

## 2021-01-24 PROCEDURE — 99284 EMERGENCY DEPT VISIT MOD MDM: CPT | Performed by: EMERGENCY MEDICINE

## 2021-01-24 PROCEDURE — 10160 PNXR ASPIR ABSC HMTMA BULLA: CPT | Performed by: EMERGENCY MEDICINE

## 2021-01-24 PROCEDURE — 99282 EMERGENCY DEPT VISIT SF MDM: CPT

## 2021-01-24 RX ORDER — LIDOCAINE HYDROCHLORIDE AND EPINEPHRINE 10; 10 MG/ML; UG/ML
10 INJECTION, SOLUTION INFILTRATION; PERINEURAL ONCE
Status: COMPLETED | OUTPATIENT
Start: 2021-01-24 | End: 2021-01-24

## 2021-01-24 RX ORDER — BUPIVACAINE HYDROCHLORIDE 2.5 MG/ML
10 INJECTION, SOLUTION EPIDURAL; INFILTRATION; INTRACAUDAL ONCE
Status: COMPLETED | OUTPATIENT
Start: 2021-01-24 | End: 2021-01-24

## 2021-01-24 RX ORDER — LIDOCAINE 40 MG/G
CREAM TOPICAL AS NEEDED
Qty: 30 G | Refills: 0 | Status: SHIPPED | OUTPATIENT
Start: 2021-01-24 | End: 2021-11-05 | Stop reason: ALTCHOICE

## 2021-01-24 RX ADMIN — LIDOCAINE HYDROCHLORIDE,EPINEPHRINE BITARTRATE 10 ML: 10; .01 INJECTION, SOLUTION INFILTRATION; PERINEURAL at 10:12

## 2021-01-24 RX ADMIN — BUPIVACAINE HYDROCHLORIDE 10 ML: 2.5 INJECTION, SOLUTION EPIDURAL; INFILTRATION; INTRACAUDAL; PERINEURAL at 10:12

## 2021-01-24 NOTE — ED PROVIDER NOTES
History  Chief Complaint   Patient presents with    Abscess     Pt c/o having popped a pimple and got worse  Pt states she saw her doctor and was placed on sulfamethoxazole but getting worse  Pt has pain and swelling on right side bottom of nose  Eligio Xie is a 53 y o  female w PMH thyroid disease, HTN who presents for evaluation of facial pain  Patient reports a few days ago she noted a pimple adjacent to the R nare  This has since become larger and more swollen  Is exquisitely tender  She went to urgent care and was started on bactrim  She has been using warm compresses without relief  She has no f/c  Prior to Admission Medications   Prescriptions Last Dose Informant Patient Reported? Taking?    Omega-3 Fatty Acids ([employer] NATURAL FISH OIL) 1000 MG CAPS  Self Yes No   Sig: Take by mouth   buPROPion (WELLBUTRIN XL) 150 mg 24 hr tablet   No No   Sig: Take 1 tablet (150 mg total) by mouth every morning   busPIRone (BUSPAR) 5 mg tablet   No No   Sig: Take 1 tablet (5 mg total) by mouth 2 (two) times a day   fluticasone (FLONASE) 50 mcg/act nasal spray   No No   Sig: SPRAY 2 SPRAYS INTO EACH NOSTRIL EVERY DAY   hydrochlorothiazide (HYDRODIURIL) 25 mg tablet   No No   Sig: Take 1 tablet (25 mg total) by mouth every morning   levothyroxine 150 mcg tablet   No No   Sig: TAKE 1 TABLET BY MOUTH EVERY DAY   medroxyPROGESTERone (DEPO-PROVERA) 150 mg/mL injection   No No   Sig: Inject 1 mL (150 mg total) into a muscle every 3 (three) months   montelukast (SINGULAIR) 10 mg tablet   Yes No   sulfamethoxazole-trimethoprim (BACTRIM DS) 800-160 mg per tablet   No No   Sig: Take 1 tablet by mouth every 12 (twelve) hours for 7 days      Facility-Administered Medications Last Administration Doses Remaining   medroxyPROGESTERone (DEPO-PROVERA) IM injection 150 mg 12/9/2020 12:59 PM           Past Medical History:   Diagnosis Date    Disease of thyroid gland     Hypertension        Past Surgical History:   Procedure Laterality Date     SECTION, LOW TRANSVERSE      Last Assessed: 5/15/2015    CHOLECYSTECTOMY      Last Assessed: 5/15/2015     IR UTERINE ARTERY EMBOLIZATION  2019    TOOTH EXTRACTION      Last Assessed: 5/15/2015       Family History   Problem Relation Age of Onset    No Known Problems Mother     No Known Problems Father      I have reviewed and agree with the history as documented  E-Cigarette/Vaping     E-Cigarette/Vaping Substances     Social History     Tobacco Use    Smoking status: Never Smoker    Smokeless tobacco: Never Used   Substance Use Topics    Alcohol use: Yes     Frequency: Monthly or less    Drug use: Never       Review of Systems   Constitutional: Negative for chills, diaphoresis and fever  HENT: Negative for congestion and sore throat  Eyes: Negative for visual disturbance  Respiratory: Negative for cough, chest tightness, shortness of breath and wheezing  Cardiovascular: Negative for chest pain and leg swelling  Gastrointestinal: Negative for abdominal pain, constipation, diarrhea, nausea and vomiting  Genitourinary: Negative for difficulty urinating, dysuria, frequency, hematuria, urgency, vaginal bleeding, vaginal discharge and vaginal pain  Musculoskeletal: Negative for arthralgias and myalgias  Skin: Positive for wound  Neurological: Negative for dizziness, weakness, light-headedness, numbness and headaches  Psychiatric/Behavioral: The patient is not nervous/anxious  Physical Exam  Physical Exam  Vitals signs and nursing note reviewed  Constitutional:       General: She is not in acute distress  Appearance: She is well-developed  She is not diaphoretic  HENT:      Head: Normocephalic and atraumatic  Comments: Pimple adjacent to the R nare, multiple areas where it is visible pus had previously drained and it at the skin surface  There is induration and warmth  Skin erythematous in color  Area about 1 x 1 cm   Circular / nodular lesion  Eyes:      Pupils: Pupils are equal, round, and reactive to light  Neck:      Musculoskeletal: Neck supple  Trachea: No tracheal deviation  Cardiovascular:      Rate and Rhythm: Normal rate and regular rhythm  Heart sounds: No murmur  No friction rub  No gallop  Pulmonary:      Effort: Pulmonary effort is normal  No respiratory distress  Breath sounds: Normal breath sounds  No wheezing or rales  Abdominal:      General: Bowel sounds are normal  There is no distension  Palpations: Abdomen is soft  There is no mass  Tenderness: There is no abdominal tenderness  There is no guarding  Musculoskeletal:         General: No deformity  Skin:     General: Skin is warm and dry  Neurological:      Mental Status: She is alert and oriented to person, place, and time  Psychiatric:         Behavior: Behavior normal          Vital Signs  ED Triage Vitals [01/24/21 0919]   Temperature Pulse Respirations Blood Pressure SpO2   99 °F (37 2 °C) 86 19 123/67 99 %      Temp Source Heart Rate Source Patient Position - Orthostatic VS BP Location FiO2 (%)   Oral Monitor Lying Right arm --      Pain Score       8           Vitals:    01/24/21 0919   BP: 123/67   Pulse: 86   Patient Position - Orthostatic VS: Lying         Visual Acuity      ED Medications  Medications   lidocaine-epinephrine (XYLOCAINE/EPINEPHRINE) 1 %-1:100,000 injection 10 mL (10 mL Infiltration Given 1/24/21 1012)   bupivacaine (PF) (MARCAINE) 0 25 % injection 10 mL (10 mL Infiltration Given 1/24/21 1012)       Diagnostic Studies  Results Reviewed     None                 No orders to display              Procedures  Incision and drain    Date/Time: 1/24/2021 9:45 AM  Performed by: Janay Machado PA-C  Authorized by: Janay Machado PA-C   Universal Protocol:  Consent: Verbal consent obtained    Consent given by: patient  Patient understanding: patient states understanding of the procedure being performed  Patient consent: the patient's understanding of the procedure matches consent given  Patient identity confirmed: arm band      Patient location:  ED  Location:     Type:  Abscess    Location:  Head/neck    Head/neck location:  Face  Pre-procedure details:     Skin preparation:  Chloraprep  Anesthesia (see MAR for exact dosages): Anesthesia method:  Local infiltration    Local anesthetic:  Lidocaine 1% WITH epi  Procedure details:     Complexity:  Simple    Needle aspiration: yes      Needle size:  18 G    Incision types:  Single straight    Aspiration type: puncture aspiration      Drainage amount: none     Packing materials:  None             ED Course                                           MDM  Number of Diagnoses or Management Options  Facial abscess:   Diagnosis management comments: DDX includes but not ltd to: Suspect small facial abscess  Seems to be draining some on and off at home  On abx wo relief  No pain out of proportion to suggest nsti  No surrounding cellulitis on exam; erythema localized to area of induration  Plan is to provide: Will attempt I and D  Otherwise continue warm compresses and antibiotics    Based on results:  I and D attempt wo any purulence removed  She did tolerate procedure well and felt so much better after lidocaine that I offered bupivicaine for longer relief which she was amenable to  Return parameters discussed  Pt requires f/u as an outpt  Pt expresses understanding w above treatment plan  All questions answered prior to d/c  Portions of the record may have been created with voice recognition software   Occasional wrong word or "sound a like" substitutions may have occurred due to the inherent limitations of voice recognition software   Read the chart carefully and recognize, using context, where substitutions have occurred          Disposition  Final diagnoses:   Facial abscess     Time reflects when diagnosis was documented in both MDM as applicable and the Disposition within this note     Time User Action Codes Description Comment    1/24/2021  9:48 AM Fran Kim Add [L02 01] Facial abscess       ED Disposition     ED Disposition Condition Date/Time Comment    Discharge Stable Sun Jan 24, 2021  9:48 AM Deanna Cordova discharge to home/self care              Follow-up Information     Follow up With Specialties Details Why Contact Info Additional Information    Katherine Hilliard PA-C Family Medicine, Physician Assistant Call  for follow up as needed [address] [place] [address] Emergency Department Emergency Medicine  If symptoms worsen [address] Emergency Department, [address] [place] [address], [facility]          Discharge Medication List as of 1/24/2021  9:51 AM      START taking these medications    Details   lidocaine (LMX) 4 % cream Apply topically as needed for mild pain, Starting Sun 1/24/2021, Normal         CONTINUE these medications which have NOT CHANGED    Details   buPROPion (WELLBUTRIN XL) 150 mg 24 hr tablet Take 1 tablet (150 mg total) by mouth every morning, Starting Mon 7/27/2020, Normal      busPIRone (BUSPAR) 5 mg tablet Take 1 tablet (5 mg total) by mouth 2 (two) times a day, Starting Mon 7/27/2020, Normal      fluticasone (FLONASE) 50 mcg/act nasal spray SPRAY 2 SPRAYS INTO EACH NOSTRIL EVERY DAY, Normal      hydrochlorothiazide (HYDRODIURIL) 25 mg tablet Take 1 tablet (25 mg total) by mouth every morning, Starting Mon 7/27/2020, Normal      levothyroxine 150 mcg tablet TAKE 1 TABLET BY MOUTH EVERY DAY, Normal      medroxyPROGESTERone (DEPO-PROVERA) 150 mg/mL injection Inject 1 mL (150 mg total) into a muscle every 3 (three) months, Starting Tue 8/25/2020, Normal      montelukast (SINGULAIR) 10 mg tablet Starting Thu 10/3/2019, Historical Med      Omega-3 Fatty Acids ([employer] NATURAL FISH OIL) 1000 MG CAPS Take by mouth, Starting Mon 6/20/2016, Historical Med      sulfamethoxazole-trimethoprim (BACTRIM DS) 800-160 mg per tablet Take 1 tablet by mouth every 12 (twelve) hours for 7 days, Starting Wed 1/20/2021, Until Wed 1/27/2021, Normal           No discharge procedures on file      PDMP Review     None          ED Provider  Electronically Signed by           Yamileth Rosa PA-C  01/24/21 2407

## 2021-01-24 NOTE — Clinical Note
Bethany Boswell was seen and treated in our emergency department on 1/24/2021  work from home until Wednesday 1/27, return to work in person on 1/28    Diagnosis:     Leida Talbert    She may return on this date: If you have any questions or concerns, please don't hesitate to call        Liza Estevez PA-C    ______________________________           _______________          _______________  Hospital Representative                              Date                                Time

## 2021-02-08 DIAGNOSIS — E03.9 HYPOTHYROIDISM, ADULT: ICD-10-CM

## 2021-02-08 RX ORDER — LEVOTHYROXINE SODIUM 0.15 MG/1
TABLET ORAL
Qty: 90 TABLET | Refills: 1 | Status: SHIPPED | OUTPATIENT
Start: 2021-02-08 | End: 2021-05-24

## 2021-02-28 DIAGNOSIS — I10 HYPERTENSION, ESSENTIAL: ICD-10-CM

## 2021-02-28 DIAGNOSIS — Z00.00 HEALTH CARE MAINTENANCE: ICD-10-CM

## 2021-03-01 RX ORDER — HYDROCHLOROTHIAZIDE 25 MG/1
TABLET ORAL
Qty: 90 TABLET | Refills: 1 | Status: SHIPPED | OUTPATIENT
Start: 2021-03-01 | End: 2021-08-24 | Stop reason: SDUPTHER

## 2021-03-01 RX ORDER — MEDROXYPROGESTERONE ACETATE 150 MG/ML
INJECTION, SUSPENSION INTRAMUSCULAR
Qty: 1 ML | Refills: 1 | Status: SHIPPED | OUTPATIENT
Start: 2021-03-01 | End: 2021-08-03

## 2021-03-03 ENCOUNTER — CLINICAL SUPPORT (OUTPATIENT)
Dept: FAMILY MEDICINE CLINIC | Facility: CLINIC | Age: 51
End: 2021-03-03
Payer: COMMERCIAL

## 2021-03-03 VITALS — HEIGHT: 68 IN | WEIGHT: 203 LBS | BODY MASS INDEX: 30.77 KG/M2

## 2021-03-03 DIAGNOSIS — Z30.013 ENCOUNTER FOR INITIAL PRESCRIPTION OF INJECTABLE CONTRACEPTIVE: Primary | ICD-10-CM

## 2021-03-03 PROCEDURE — 3008F BODY MASS INDEX DOCD: CPT | Performed by: FAMILY MEDICINE

## 2021-03-03 PROCEDURE — 96372 THER/PROPH/DIAG INJ SC/IM: CPT

## 2021-03-03 RX ORDER — MEDROXYPROGESTERONE ACETATE 150 MG/ML
150 INJECTION, SUSPENSION INTRAMUSCULAR ONCE
Status: COMPLETED | OUTPATIENT
Start: 2021-03-03 | End: 2021-03-03

## 2021-03-03 RX ADMIN — MEDROXYPROGESTERONE ACETATE 150 MG: 150 INJECTION, SUSPENSION INTRAMUSCULAR at 11:44

## 2021-03-03 NOTE — PROGRESS NOTES
After obtaining consent, and per orders of Rebecca Ni PA-C, injection of Depo-Provera given by Claudette Side, LPN  Patient instructed to remain in clinic for 20 minutes afterwards, and to report any adverse reaction to me immediately  Injection given in left deltoid  Patient tolerated well  Patient left office in stable condition

## 2021-03-06 DIAGNOSIS — F41.1 GENERALIZED ANXIETY DISORDER: ICD-10-CM

## 2021-03-06 DIAGNOSIS — F32.0 MAJOR DEPRESSIVE DISORDER, SINGLE EPISODE, MILD WITH ANXIOUS DISTRESS (HCC): ICD-10-CM

## 2021-03-08 RX ORDER — BUSPIRONE HYDROCHLORIDE 5 MG/1
TABLET ORAL
Qty: 180 TABLET | Refills: 1 | Status: SHIPPED | OUTPATIENT
Start: 2021-03-08 | End: 2021-08-24 | Stop reason: SDUPTHER

## 2021-03-08 RX ORDER — BUPROPION HYDROCHLORIDE 150 MG/1
TABLET ORAL
Qty: 90 TABLET | Refills: 1 | Status: SHIPPED | OUTPATIENT
Start: 2021-03-08 | End: 2021-08-24 | Stop reason: SDUPTHER

## 2021-03-30 DIAGNOSIS — Z23 ENCOUNTER FOR IMMUNIZATION: ICD-10-CM

## 2021-04-27 ENCOUNTER — IMMUNIZATIONS (OUTPATIENT)
Dept: FAMILY MEDICINE CLINIC | Facility: HOSPITAL | Age: 51
End: 2021-04-27

## 2021-04-27 DIAGNOSIS — Z23 ENCOUNTER FOR IMMUNIZATION: Primary | ICD-10-CM

## 2021-04-27 PROCEDURE — 91300 SARS-COV-2 / COVID-19 MRNA VACCINE (PFIZER-BIONTECH) 30 MCG: CPT

## 2021-04-27 PROCEDURE — 0001A SARS-COV-2 / COVID-19 MRNA VACCINE (PFIZER-BIONTECH) 30 MCG: CPT

## 2021-05-19 ENCOUNTER — IMMUNIZATIONS (OUTPATIENT)
Dept: FAMILY MEDICINE CLINIC | Facility: HOSPITAL | Age: 51
End: 2021-05-19

## 2021-05-19 DIAGNOSIS — Z23 ENCOUNTER FOR IMMUNIZATION: Primary | ICD-10-CM

## 2021-05-19 PROCEDURE — 91300 SARS-COV-2 / COVID-19 MRNA VACCINE (PFIZER-BIONTECH) 30 MCG: CPT

## 2021-05-19 PROCEDURE — 0002A SARS-COV-2 / COVID-19 MRNA VACCINE (PFIZER-BIONTECH) 30 MCG: CPT

## 2021-05-24 DIAGNOSIS — E03.9 HYPOTHYROIDISM, ADULT: ICD-10-CM

## 2021-05-24 RX ORDER — LEVOTHYROXINE SODIUM 0.15 MG/1
TABLET ORAL
Qty: 90 TABLET | Refills: 1 | Status: SHIPPED | OUTPATIENT
Start: 2021-05-24 | End: 2022-06-04

## 2021-05-26 ENCOUNTER — CLINICAL SUPPORT (OUTPATIENT)
Dept: FAMILY MEDICINE CLINIC | Facility: CLINIC | Age: 51
End: 2021-05-26

## 2021-05-26 DIAGNOSIS — Z30.42 ENCOUNTER FOR DEPO-PROVERA CONTRACEPTION: Primary | ICD-10-CM

## 2021-05-26 RX ADMIN — MEDROXYPROGESTERONE ACETATE 150 MG: 150 INJECTION, SUSPENSION INTRAMUSCULAR at 14:36

## 2021-05-26 NOTE — PROGRESS NOTES
Pt presents today for a nurse visit ro receive her Depo-provera injection  Pt is within accurate administration period  Depo given in right deltoid  Pt tolerated well  Depo provided by pt

## 2021-08-02 DIAGNOSIS — Z00.00 HEALTH CARE MAINTENANCE: ICD-10-CM

## 2021-08-03 RX ORDER — MEDROXYPROGESTERONE ACETATE 150 MG/ML
INJECTION, SUSPENSION INTRAMUSCULAR
Qty: 1 ML | Refills: 1 | Status: SHIPPED | OUTPATIENT
Start: 2021-08-03 | End: 2022-01-14

## 2021-08-18 ENCOUNTER — CLINICAL SUPPORT (OUTPATIENT)
Dept: FAMILY MEDICINE CLINIC | Facility: CLINIC | Age: 51
End: 2021-08-18

## 2021-08-18 DIAGNOSIS — Z30.42 ENCOUNTER FOR DEPO-PROVERA CONTRACEPTION: Primary | ICD-10-CM

## 2021-08-18 RX ADMIN — MEDROXYPROGESTERONE ACETATE 150 MG: 150 INJECTION, SUSPENSION INTRAMUSCULAR at 13:18

## 2021-08-18 NOTE — PROGRESS NOTES
Patient came in for the administration of her Depo shot  Administered in left arm   Patient tolerated well and is due back between 11/9 - 11/17

## 2021-08-23 DIAGNOSIS — F32.0 MAJOR DEPRESSIVE DISORDER, SINGLE EPISODE, MILD WITH ANXIOUS DISTRESS (HCC): ICD-10-CM

## 2021-08-23 DIAGNOSIS — F41.1 GENERALIZED ANXIETY DISORDER: ICD-10-CM

## 2021-08-23 DIAGNOSIS — I10 HYPERTENSION, ESSENTIAL: ICD-10-CM

## 2021-08-24 RX ORDER — HYDROCHLOROTHIAZIDE 25 MG/1
TABLET ORAL
Qty: 90 TABLET | Refills: 1 | Status: SHIPPED | OUTPATIENT
Start: 2021-08-24 | End: 2022-02-26

## 2021-08-24 RX ORDER — BUSPIRONE HYDROCHLORIDE 5 MG/1
TABLET ORAL
Qty: 180 TABLET | Refills: 1 | Status: SHIPPED | OUTPATIENT
Start: 2021-08-24 | End: 2022-02-28 | Stop reason: SDUPTHER

## 2021-08-24 RX ORDER — BUPROPION HYDROCHLORIDE 150 MG/1
TABLET ORAL
Qty: 90 TABLET | Refills: 1 | Status: SHIPPED | OUTPATIENT
Start: 2021-08-24 | End: 2022-02-26

## 2021-11-05 ENCOUNTER — OFFICE VISIT (OUTPATIENT)
Dept: FAMILY MEDICINE CLINIC | Facility: CLINIC | Age: 51
End: 2021-11-05
Payer: COMMERCIAL

## 2021-11-05 VITALS
BODY MASS INDEX: 30.92 KG/M2 | TEMPERATURE: 99.4 F | DIASTOLIC BLOOD PRESSURE: 70 MMHG | WEIGHT: 204 LBS | OXYGEN SATURATION: 95 % | HEART RATE: 90 BPM | HEIGHT: 68 IN | SYSTOLIC BLOOD PRESSURE: 122 MMHG

## 2021-11-05 DIAGNOSIS — I10 BENIGN ESSENTIAL HYPERTENSION: ICD-10-CM

## 2021-11-05 DIAGNOSIS — Z12.11 COLON CANCER SCREENING: ICD-10-CM

## 2021-11-05 DIAGNOSIS — Z00.00 ANNUAL PHYSICAL EXAM: Primary | ICD-10-CM

## 2021-11-05 DIAGNOSIS — E78.1 HYPERTRIGLYCERIDEMIA WITHOUT HYPERCHOLESTEROLEMIA: ICD-10-CM

## 2021-11-05 DIAGNOSIS — E03.9 ACQUIRED HYPOTHYROIDISM: ICD-10-CM

## 2021-11-05 DIAGNOSIS — G47.00 INSOMNIA, UNSPECIFIED TYPE: ICD-10-CM

## 2021-11-05 PROCEDURE — 3725F SCREEN DEPRESSION PERFORMED: CPT | Performed by: PHYSICIAN ASSISTANT

## 2021-11-05 PROCEDURE — 99396 PREV VISIT EST AGE 40-64: CPT | Performed by: PHYSICIAN ASSISTANT

## 2021-11-05 RX ORDER — ZOLPIDEM TARTRATE 6.25 MG/1
6.25 TABLET, FILM COATED, EXTENDED RELEASE ORAL
Qty: 30 TABLET | Refills: 0 | Status: SHIPPED | OUTPATIENT
Start: 2021-11-05 | End: 2021-12-08

## 2021-11-10 ENCOUNTER — APPOINTMENT (OUTPATIENT)
Dept: LAB | Facility: HOSPITAL | Age: 51
End: 2021-11-10
Payer: COMMERCIAL

## 2021-11-10 DIAGNOSIS — E03.9 ACQUIRED HYPOTHYROIDISM: ICD-10-CM

## 2021-11-10 DIAGNOSIS — I10 BENIGN ESSENTIAL HYPERTENSION: ICD-10-CM

## 2021-11-10 DIAGNOSIS — E78.1 HYPERTRIGLYCERIDEMIA WITHOUT HYPERCHOLESTEROLEMIA: ICD-10-CM

## 2021-11-10 LAB
ALBUMIN SERPL BCP-MCNC: 4 G/DL (ref 3.5–5)
ALP SERPL-CCNC: 44 U/L (ref 46–116)
ALT SERPL W P-5'-P-CCNC: 42 U/L (ref 12–78)
ANION GAP SERPL CALCULATED.3IONS-SCNC: 11 MMOL/L (ref 4–13)
AST SERPL W P-5'-P-CCNC: 19 U/L (ref 5–45)
BASOPHILS # BLD AUTO: 0.05 THOUSANDS/ΜL (ref 0–0.1)
BASOPHILS NFR BLD AUTO: 1 % (ref 0–1)
BILIRUB SERPL-MCNC: 1.18 MG/DL (ref 0.2–1)
BUN SERPL-MCNC: 22 MG/DL (ref 5–25)
CALCIUM SERPL-MCNC: 8.9 MG/DL (ref 8.3–10.1)
CHLORIDE SERPL-SCNC: 104 MMOL/L (ref 100–108)
CHOLEST SERPL-MCNC: 190 MG/DL (ref 50–200)
CO2 SERPL-SCNC: 27 MMOL/L (ref 21–32)
CREAT SERPL-MCNC: 1.05 MG/DL (ref 0.6–1.3)
EOSINOPHIL # BLD AUTO: 0.14 THOUSAND/ΜL (ref 0–0.61)
EOSINOPHIL NFR BLD AUTO: 2 % (ref 0–6)
ERYTHROCYTE [DISTWIDTH] IN BLOOD BY AUTOMATED COUNT: 12.1 % (ref 11.6–15.1)
GFR SERPL CREATININE-BSD FRML MDRD: 62 ML/MIN/1.73SQ M
GLUCOSE P FAST SERPL-MCNC: 98 MG/DL (ref 65–99)
HCT VFR BLD AUTO: 45.4 % (ref 34.8–46.1)
HDLC SERPL-MCNC: 48 MG/DL
HGB BLD-MCNC: 15 G/DL (ref 11.5–15.4)
IMM GRANULOCYTES # BLD AUTO: 0.01 THOUSAND/UL (ref 0–0.2)
IMM GRANULOCYTES NFR BLD AUTO: 0 % (ref 0–2)
LDLC SERPL CALC-MCNC: 119 MG/DL (ref 0–100)
LYMPHOCYTES # BLD AUTO: 2.21 THOUSANDS/ΜL (ref 0.6–4.47)
LYMPHOCYTES NFR BLD AUTO: 36 % (ref 14–44)
MCH RBC QN AUTO: 30.9 PG (ref 26.8–34.3)
MCHC RBC AUTO-ENTMCNC: 33 G/DL (ref 31.4–37.4)
MCV RBC AUTO: 93 FL (ref 82–98)
MONOCYTES # BLD AUTO: 0.48 THOUSAND/ΜL (ref 0.17–1.22)
MONOCYTES NFR BLD AUTO: 8 % (ref 4–12)
NEUTROPHILS # BLD AUTO: 3.18 THOUSANDS/ΜL (ref 1.85–7.62)
NEUTS SEG NFR BLD AUTO: 53 % (ref 43–75)
NRBC BLD AUTO-RTO: 0 /100 WBCS
PLATELET # BLD AUTO: 204 THOUSANDS/UL (ref 149–390)
PMV BLD AUTO: 10.8 FL (ref 8.9–12.7)
POTASSIUM SERPL-SCNC: 4.2 MMOL/L (ref 3.5–5.3)
PROT SERPL-MCNC: 7.5 G/DL (ref 6.4–8.2)
RBC # BLD AUTO: 4.86 MILLION/UL (ref 3.81–5.12)
SODIUM SERPL-SCNC: 142 MMOL/L (ref 136–145)
T4 FREE SERPL-MCNC: 1.29 NG/DL (ref 0.76–1.46)
TRIGL SERPL-MCNC: 113 MG/DL
TSH SERPL DL<=0.05 MIU/L-ACNC: 1.48 UIU/ML (ref 0.36–3.74)
WBC # BLD AUTO: 6.07 THOUSAND/UL (ref 4.31–10.16)

## 2021-11-10 PROCEDURE — 85025 COMPLETE CBC W/AUTO DIFF WBC: CPT

## 2021-11-10 PROCEDURE — 84439 ASSAY OF FREE THYROXINE: CPT

## 2021-11-10 PROCEDURE — 80053 COMPREHEN METABOLIC PANEL: CPT

## 2021-11-10 PROCEDURE — 36415 COLL VENOUS BLD VENIPUNCTURE: CPT

## 2021-11-10 PROCEDURE — 80061 LIPID PANEL: CPT

## 2021-11-10 PROCEDURE — 84443 ASSAY THYROID STIM HORMONE: CPT

## 2021-11-15 ENCOUNTER — CLINICAL SUPPORT (OUTPATIENT)
Dept: FAMILY MEDICINE CLINIC | Facility: CLINIC | Age: 51
End: 2021-11-15
Payer: COMMERCIAL

## 2021-11-15 DIAGNOSIS — Z30.42 ENCOUNTER FOR DEPO-PROVERA CONTRACEPTION: Primary | ICD-10-CM

## 2021-11-15 PROCEDURE — 96372 THER/PROPH/DIAG INJ SC/IM: CPT

## 2021-11-15 RX ADMIN — MEDROXYPROGESTERONE ACETATE 150 MG: 150 INJECTION, SUSPENSION INTRAMUSCULAR at 13:23

## 2021-11-17 ENCOUNTER — OFFICE VISIT (OUTPATIENT)
Dept: FAMILY MEDICINE CLINIC | Facility: CLINIC | Age: 51
End: 2021-11-17
Payer: COMMERCIAL

## 2021-11-17 VITALS
BODY MASS INDEX: 30.92 KG/M2 | SYSTOLIC BLOOD PRESSURE: 124 MMHG | HEART RATE: 88 BPM | DIASTOLIC BLOOD PRESSURE: 76 MMHG | HEIGHT: 68 IN | WEIGHT: 204 LBS | OXYGEN SATURATION: 99 % | TEMPERATURE: 97.8 F

## 2021-11-17 DIAGNOSIS — L02.02 FURUNCLE OF CHIN: Primary | ICD-10-CM

## 2021-11-17 PROCEDURE — 99213 OFFICE O/P EST LOW 20 MIN: CPT | Performed by: FAMILY MEDICINE

## 2021-11-17 PROCEDURE — 1036F TOBACCO NON-USER: CPT | Performed by: FAMILY MEDICINE

## 2021-11-17 PROCEDURE — 3008F BODY MASS INDEX DOCD: CPT | Performed by: FAMILY MEDICINE

## 2021-11-17 RX ORDER — SULFAMETHOXAZOLE AND TRIMETHOPRIM 800; 160 MG/1; MG/1
1 TABLET ORAL EVERY 12 HOURS SCHEDULED
Qty: 20 TABLET | Refills: 0 | Status: SHIPPED | OUTPATIENT
Start: 2021-11-17 | End: 2021-11-27

## 2021-12-01 LAB — COLOGUARD RESULT REPORTABLE: NEGATIVE

## 2022-01-14 DIAGNOSIS — Z00.00 HEALTH CARE MAINTENANCE: ICD-10-CM

## 2022-01-14 RX ORDER — MEDROXYPROGESTERONE ACETATE 150 MG/ML
INJECTION, SUSPENSION INTRAMUSCULAR
Qty: 1 ML | Refills: 1 | Status: SHIPPED | OUTPATIENT
Start: 2022-01-14 | End: 2022-07-11

## 2022-01-21 ENCOUNTER — IMMUNIZATIONS (OUTPATIENT)
Dept: FAMILY MEDICINE CLINIC | Facility: HOSPITAL | Age: 52
End: 2022-01-21

## 2022-01-21 DIAGNOSIS — Z23 ENCOUNTER FOR IMMUNIZATION: Primary | ICD-10-CM

## 2022-01-21 PROCEDURE — 91300 COVID-19 PFIZER VACC 0.3 ML: CPT

## 2022-01-21 PROCEDURE — 0001A COVID-19 PFIZER VACC 0.3 ML: CPT

## 2022-02-07 ENCOUNTER — CLINICAL SUPPORT (OUTPATIENT)
Dept: FAMILY MEDICINE CLINIC | Facility: CLINIC | Age: 52
End: 2022-02-07
Payer: COMMERCIAL

## 2022-02-07 DIAGNOSIS — Z30.42 ENCOUNTER FOR DEPO-PROVERA CONTRACEPTION: Primary | ICD-10-CM

## 2022-02-07 PROCEDURE — 96372 THER/PROPH/DIAG INJ SC/IM: CPT

## 2022-02-07 RX ORDER — MEDROXYPROGESTERONE ACETATE 150 MG/ML
150 INJECTION, SUSPENSION INTRAMUSCULAR ONCE
Status: COMPLETED | OUTPATIENT
Start: 2022-02-07 | End: 2022-02-07

## 2022-02-07 RX ADMIN — MEDROXYPROGESTERONE ACETATE 150 MG: 150 INJECTION, SUSPENSION INTRAMUSCULAR at 13:26

## 2022-02-07 NOTE — PROGRESS NOTES
Pt seen for a nurse visit for a Depo provera injection, administered in left deltoid per pt request  Pt tolerated it well  Next nurse visit for Depo provera injection should be between 04/25/22-05/09/22 and pt is aware      Myriam Kim/maximiliano  02/07/22  1:07 PM

## 2022-02-26 DIAGNOSIS — F41.1 GENERALIZED ANXIETY DISORDER: ICD-10-CM

## 2022-02-26 DIAGNOSIS — I10 HYPERTENSION, ESSENTIAL: ICD-10-CM

## 2022-02-26 DIAGNOSIS — F32.0 MAJOR DEPRESSIVE DISORDER, SINGLE EPISODE, MILD WITH ANXIOUS DISTRESS (HCC): ICD-10-CM

## 2022-02-26 RX ORDER — BUPROPION HYDROCHLORIDE 150 MG/1
TABLET ORAL
Qty: 90 TABLET | Refills: 1 | Status: SHIPPED | OUTPATIENT
Start: 2022-02-26

## 2022-02-26 RX ORDER — HYDROCHLOROTHIAZIDE 25 MG/1
TABLET ORAL
Qty: 90 TABLET | Refills: 1 | Status: SHIPPED | OUTPATIENT
Start: 2022-02-26

## 2022-02-28 RX ORDER — BUSPIRONE HYDROCHLORIDE 5 MG/1
TABLET ORAL
Qty: 180 TABLET | Refills: 1 | Status: SHIPPED | OUTPATIENT
Start: 2022-02-28

## 2022-03-18 ENCOUNTER — OFFICE VISIT (OUTPATIENT)
Dept: FAMILY MEDICINE CLINIC | Facility: CLINIC | Age: 52
End: 2022-03-18
Payer: COMMERCIAL

## 2022-03-18 VITALS
HEART RATE: 98 BPM | TEMPERATURE: 98.6 F | HEIGHT: 68 IN | BODY MASS INDEX: 31.52 KG/M2 | WEIGHT: 208 LBS | OXYGEN SATURATION: 99 % | SYSTOLIC BLOOD PRESSURE: 134 MMHG | DIASTOLIC BLOOD PRESSURE: 82 MMHG

## 2022-03-18 DIAGNOSIS — L03.211 FACIAL CELLULITIS: Primary | ICD-10-CM

## 2022-03-18 PROCEDURE — 1036F TOBACCO NON-USER: CPT | Performed by: FAMILY MEDICINE

## 2022-03-18 PROCEDURE — 99214 OFFICE O/P EST MOD 30 MIN: CPT | Performed by: FAMILY MEDICINE

## 2022-03-18 RX ORDER — SULFAMETHOXAZOLE AND TRIMETHOPRIM 800; 160 MG/1; MG/1
1 TABLET ORAL EVERY 12 HOURS SCHEDULED
Qty: 14 TABLET | Refills: 0 | Status: SHIPPED | OUTPATIENT
Start: 2022-03-18 | End: 2022-03-22 | Stop reason: HOSPADM

## 2022-03-18 NOTE — PROGRESS NOTES
Assessment/Plan:    No problem-specific Assessment & Plan notes found for this encounter  Diagnoses and all orders for this visit:    Facial cellulitis  -     sulfamethoxazole-trimethoprim (BACTRIM DS) 800-160 mg per tablet; Take 1 tablet by mouth every 12 (twelve) hours for 7 days      Will cover for MRSA, previously treated for a cellulitis with Keflex and failed but cleared with Bactrim  Will treat with Bactrim, patient advised to follow up if no improvement  Subjective:      Patient ID: Walter Pantoja is a 49 y o  female  HPI    Notes that she has a pimple along her lip line  Notes that she did not pop the pimple  States that she first noticed the pimple yesterday, then lip started swelling a little  Notes that this morning her lip was swollen and painful  Using warm compress which is helpful  Notes that there is no drainage at this time from the lip  Denies fever or chills  Previously with similar situation, treated with Keflex and infection worsened, cleared when treated with bactrim  The following portions of the patient's history were reviewed and updated as appropriate: allergies, current medications, past family history, past medical history, past social history, past surgical history and problem list     Review of Systems      Objective:  /82   Pulse 98   Temp 98 6 °F (37 °C)   Ht 5' 7 5" (1 715 m)   Wt 94 3 kg (208 lb)   SpO2 99%   BMI 32 10 kg/m²      Physical Exam  Vitals reviewed  Constitutional:       General: She is not in acute distress  Appearance: Normal appearance  HENT:      Head: Normocephalic and atraumatic  Right Ear: External ear normal       Left Ear: External ear normal       Nose: Nose normal       Mouth/Throat:      Mouth: Mucous membranes are moist       Comments: Induration of lower lip on right side, no fluctuance  No drainage  Mild erythema, mild warmth  Eyes:      Extraocular Movements: Extraocular movements intact  Conjunctiva/sclera: Conjunctivae normal    Cardiovascular:      Rate and Rhythm: Normal rate and regular rhythm  Heart sounds: Normal heart sounds  Pulmonary:      Effort: Pulmonary effort is normal       Breath sounds: Normal breath sounds  No wheezing, rhonchi or rales  Abdominal:      General: Bowel sounds are normal  There is no distension  Palpations: Abdomen is soft  Tenderness: There is no abdominal tenderness  There is no guarding  Skin:     Capillary Refill: Capillary refill takes less than 2 seconds  Neurological:      Mental Status: She is alert  Mental status is at baseline           Kellie Baptiste DO  [facility]  3/18/2022 2:18 PM

## 2022-03-20 ENCOUNTER — HOSPITAL ENCOUNTER (INPATIENT)
Facility: HOSPITAL | Age: 52
LOS: 1 days | Discharge: HOME | DRG: 872 | End: 2022-03-22
Attending: EMERGENCY MEDICINE | Admitting: INTERNAL MEDICINE
Payer: COMMERCIAL

## 2022-03-20 ENCOUNTER — APPOINTMENT (EMERGENCY)
Dept: CT IMAGING | Facility: HOSPITAL | Age: 52
DRG: 872 | End: 2022-03-20
Payer: COMMERCIAL

## 2022-03-20 DIAGNOSIS — D72.829 LEUKOCYTOSIS: ICD-10-CM

## 2022-03-20 DIAGNOSIS — A41.9 SEPSIS (HCC): ICD-10-CM

## 2022-03-20 DIAGNOSIS — R22.0 LIP SWELLING: ICD-10-CM

## 2022-03-20 DIAGNOSIS — Z78.9 FAILURE OF OUTPATIENT TREATMENT: ICD-10-CM

## 2022-03-20 DIAGNOSIS — K13.0 CELLULITIS, LIP: Primary | ICD-10-CM

## 2022-03-20 DIAGNOSIS — R50.9 FEVER: ICD-10-CM

## 2022-03-20 LAB
ANION GAP SERPL CALCULATED.3IONS-SCNC: 10 MMOL/L (ref 4–13)
BASOPHILS # BLD AUTO: 0.04 THOUSANDS/ΜL (ref 0–0.1)
BASOPHILS NFR BLD AUTO: 0 % (ref 0–1)
BUN SERPL-MCNC: 9 MG/DL (ref 5–25)
CALCIUM SERPL-MCNC: 9 MG/DL (ref 8.3–10.1)
CHLORIDE SERPL-SCNC: 102 MMOL/L (ref 100–108)
CO2 SERPL-SCNC: 25 MMOL/L (ref 21–32)
CREAT SERPL-MCNC: 0.93 MG/DL (ref 0.6–1.3)
EOSINOPHIL # BLD AUTO: 0.11 THOUSAND/ΜL (ref 0–0.61)
EOSINOPHIL NFR BLD AUTO: 1 % (ref 0–6)
ERYTHROCYTE [DISTWIDTH] IN BLOOD BY AUTOMATED COUNT: 12.7 % (ref 11.6–15.1)
GFR SERPL CREATININE-BSD FRML MDRD: 71 ML/MIN/1.73SQ M
GLUCOSE SERPL-MCNC: 105 MG/DL (ref 65–140)
HCT VFR BLD AUTO: 40.2 % (ref 34.8–46.1)
HGB BLD-MCNC: 14.4 G/DL (ref 11.5–15.4)
IMM GRANULOCYTES # BLD AUTO: 0.03 THOUSAND/UL (ref 0–0.2)
IMM GRANULOCYTES NFR BLD AUTO: 0 % (ref 0–2)
LACTATE SERPL-SCNC: 0.8 MMOL/L (ref 0.5–2)
LYMPHOCYTES # BLD AUTO: 1.42 THOUSANDS/ΜL (ref 0.6–4.47)
LYMPHOCYTES NFR BLD AUTO: 13 % (ref 14–44)
MCH RBC QN AUTO: 31.7 PG (ref 26.8–34.3)
MCHC RBC AUTO-ENTMCNC: 35.8 G/DL (ref 31.4–37.4)
MCV RBC AUTO: 89 FL (ref 82–98)
MONOCYTES # BLD AUTO: 0.75 THOUSAND/ΜL (ref 0.17–1.22)
MONOCYTES NFR BLD AUTO: 7 % (ref 4–12)
NEUTROPHILS # BLD AUTO: 8.65 THOUSANDS/ΜL (ref 1.85–7.62)
NEUTS SEG NFR BLD AUTO: 79 % (ref 43–75)
NRBC BLD AUTO-RTO: 0 /100 WBCS
PLATELET # BLD AUTO: 187 THOUSANDS/UL (ref 149–390)
PMV BLD AUTO: 10.6 FL (ref 8.9–12.7)
POTASSIUM SERPL-SCNC: 3.5 MMOL/L (ref 3.5–5.3)
RBC # BLD AUTO: 4.54 MILLION/UL (ref 3.81–5.12)
SODIUM SERPL-SCNC: 137 MMOL/L (ref 136–145)
WBC # BLD AUTO: 11 THOUSAND/UL (ref 4.31–10.16)

## 2022-03-20 PROCEDURE — 70487 CT MAXILLOFACIAL W/DYE: CPT

## 2022-03-20 PROCEDURE — 99285 EMERGENCY DEPT VISIT HI MDM: CPT | Performed by: EMERGENCY MEDICINE

## 2022-03-20 PROCEDURE — 0C913ZZ DRAINAGE OF LOWER LIP, PERCUTANEOUS APPROACH: ICD-10-PCS | Performed by: PHYSICIAN ASSISTANT

## 2022-03-20 PROCEDURE — 85025 COMPLETE CBC W/AUTO DIFF WBC: CPT | Performed by: EMERGENCY MEDICINE

## 2022-03-20 PROCEDURE — 99254 IP/OBS CNSLTJ NEW/EST MOD 60: CPT | Performed by: SURGERY

## 2022-03-20 PROCEDURE — 99285 EMERGENCY DEPT VISIT HI MDM: CPT

## 2022-03-20 PROCEDURE — 96375 TX/PRO/DX INJ NEW DRUG ADDON: CPT

## 2022-03-20 PROCEDURE — 87070 CULTURE OTHR SPECIMN AEROBIC: CPT | Performed by: PHYSICIAN ASSISTANT

## 2022-03-20 PROCEDURE — 80048 BASIC METABOLIC PNL TOTAL CA: CPT | Performed by: EMERGENCY MEDICINE

## 2022-03-20 PROCEDURE — 99220 PR INITIAL OBSERVATION CARE/DAY 70 MINUTES: CPT | Performed by: INTERNAL MEDICINE

## 2022-03-20 PROCEDURE — 83605 ASSAY OF LACTIC ACID: CPT | Performed by: EMERGENCY MEDICINE

## 2022-03-20 PROCEDURE — 36415 COLL VENOUS BLD VENIPUNCTURE: CPT | Performed by: EMERGENCY MEDICINE

## 2022-03-20 PROCEDURE — 96367 TX/PROPH/DG ADDL SEQ IV INF: CPT

## 2022-03-20 PROCEDURE — 87186 SC STD MICRODIL/AGAR DIL: CPT | Performed by: PHYSICIAN ASSISTANT

## 2022-03-20 PROCEDURE — 96365 THER/PROPH/DIAG IV INF INIT: CPT

## 2022-03-20 PROCEDURE — 87040 BLOOD CULTURE FOR BACTERIA: CPT | Performed by: EMERGENCY MEDICINE

## 2022-03-20 PROCEDURE — 87205 SMEAR GRAM STAIN: CPT | Performed by: PHYSICIAN ASSISTANT

## 2022-03-20 PROCEDURE — 87075 CULTR BACTERIA EXCEPT BLOOD: CPT | Performed by: PHYSICIAN ASSISTANT

## 2022-03-20 RX ORDER — BUSPIRONE HYDROCHLORIDE 5 MG/1
5 TABLET ORAL 2 TIMES DAILY
Status: DISCONTINUED | OUTPATIENT
Start: 2022-03-20 | End: 2022-03-22 | Stop reason: HOSPADM

## 2022-03-20 RX ORDER — OXYCODONE HYDROCHLORIDE 5 MG/1
5 TABLET ORAL EVERY 4 HOURS PRN
Status: DISCONTINUED | OUTPATIENT
Start: 2022-03-20 | End: 2022-03-22 | Stop reason: HOSPADM

## 2022-03-20 RX ORDER — OXYCODONE HYDROCHLORIDE 5 MG/1
7.5 TABLET ORAL EVERY 4 HOURS PRN
Status: DISCONTINUED | OUTPATIENT
Start: 2022-03-20 | End: 2022-03-22 | Stop reason: HOSPADM

## 2022-03-20 RX ORDER — LEVOTHYROXINE SODIUM 0.15 MG/1
150 TABLET ORAL DAILY
Status: DISCONTINUED | OUTPATIENT
Start: 2022-03-20 | End: 2022-03-21

## 2022-03-20 RX ORDER — ZOLPIDEM TARTRATE 5 MG/1
5 TABLET ORAL
Status: DISCONTINUED | OUTPATIENT
Start: 2022-03-20 | End: 2022-03-22 | Stop reason: HOSPADM

## 2022-03-20 RX ORDER — CLINDAMYCIN PHOSPHATE 600 MG/50ML
600 INJECTION INTRAVENOUS ONCE
Status: COMPLETED | OUTPATIENT
Start: 2022-03-20 | End: 2022-03-20

## 2022-03-20 RX ORDER — KETOROLAC TROMETHAMINE 30 MG/ML
15 INJECTION, SOLUTION INTRAMUSCULAR; INTRAVENOUS ONCE
Status: COMPLETED | OUTPATIENT
Start: 2022-03-20 | End: 2022-03-20

## 2022-03-20 RX ORDER — ACETAMINOPHEN 325 MG/1
650 TABLET ORAL EVERY 6 HOURS PRN
Status: DISCONTINUED | OUTPATIENT
Start: 2022-03-20 | End: 2022-03-22 | Stop reason: HOSPADM

## 2022-03-20 RX ORDER — BUPROPION HYDROCHLORIDE 150 MG/1
150 TABLET ORAL EVERY MORNING
Status: DISCONTINUED | OUTPATIENT
Start: 2022-03-20 | End: 2022-03-22 | Stop reason: HOSPADM

## 2022-03-20 RX ORDER — HYDROCHLOROTHIAZIDE 25 MG/1
25 TABLET ORAL EVERY MORNING
Status: DISCONTINUED | OUTPATIENT
Start: 2022-03-20 | End: 2022-03-22 | Stop reason: HOSPADM

## 2022-03-20 RX ORDER — LIDOCAINE HYDROCHLORIDE 10 MG/ML
10 INJECTION, SOLUTION EPIDURAL; INFILTRATION; INTRACAUDAL; PERINEURAL ONCE
Status: COMPLETED | OUTPATIENT
Start: 2022-03-20 | End: 2022-03-20

## 2022-03-20 RX ADMIN — KETOROLAC TROMETHAMINE 15 MG: 30 INJECTION, SOLUTION INTRAMUSCULAR at 06:27

## 2022-03-20 RX ADMIN — ACETAMINOPHEN 650 MG: 325 TABLET, FILM COATED ORAL at 20:41

## 2022-03-20 RX ADMIN — IOHEXOL 85 ML: 350 INJECTION, SOLUTION INTRAVENOUS at 07:00

## 2022-03-20 RX ADMIN — CLINDAMYCIN PHOSPHATE 600 MG: 600 INJECTION, SOLUTION INTRAVENOUS at 06:29

## 2022-03-20 RX ADMIN — SODIUM CHLORIDE, SODIUM LACTATE, POTASSIUM CHLORIDE, AND CALCIUM CHLORIDE 1000 ML: .6; .31; .03; .02 INJECTION, SOLUTION INTRAVENOUS at 07:58

## 2022-03-20 RX ADMIN — LIDOCAINE HYDROCHLORIDE 10 ML: 10 INJECTION, SOLUTION EPIDURAL; INFILTRATION; INTRACAUDAL at 11:10

## 2022-03-20 RX ADMIN — BUSPIRONE HYDROCHLORIDE 5 MG: 5 TABLET ORAL at 18:02

## 2022-03-20 RX ADMIN — AMPICILLIN SODIUM AND SULBACTAM SODIUM 3 G: 2; 1 INJECTION, POWDER, FOR SOLUTION INTRAMUSCULAR; INTRAVENOUS at 13:26

## 2022-03-20 RX ADMIN — VANCOMYCIN HYDROCHLORIDE 1250 MG: 5 INJECTION, POWDER, LYOPHILIZED, FOR SOLUTION INTRAVENOUS at 20:33

## 2022-03-20 RX ADMIN — VANCOMYCIN HYDROCHLORIDE 1250 MG: 5 INJECTION, POWDER, LYOPHILIZED, FOR SOLUTION INTRAVENOUS at 10:58

## 2022-03-20 RX ADMIN — AMPICILLIN SODIUM AND SULBACTAM SODIUM 3 G: 2; 1 INJECTION, POWDER, FOR SOLUTION INTRAMUSCULAR; INTRAVENOUS at 18:02

## 2022-03-20 RX ADMIN — OXYCODONE HYDROCHLORIDE 7.5 MG: 5 TABLET ORAL at 13:59

## 2022-03-20 NOTE — H&P
[address]   1970, 49 y o  female MRN: [id number]  Unit/Bed#: -01 Encounter: [id number]  Primary Care Provider: Carloz Rowe PA-C   Date and time admitted to hospital: 3/20/2022  5:35 AM    * Cellulitis of lip  Assessment & Plan  · Patient states she had pimple noted on her lip last Thursday which has been worsening over the past couple days  Patient was prescribed Bactrim as an outpatient with no improvement  · CT facial bone-cellulitis of lower lip, asymmetric to the right; phlegmonous changes crossing midline  · Fever noted on admission  · WBC 11    Plan  · Given cellulitis started as pimple and is noted on inside of patient's mouth will cover with Unasyn and vancomycin at this time  · Blood cultures x2 drawn  · General surgery consulted for likely aspiration  · If aspirated will follow-up cultures  · Continue to monitor for worsening signs of infection    Sepsis ([address] )  Assessment & Plan  · As noted by fever, tachycardia and elevated white count  · Plan as above    Major depressive disorder, single episode, mild with anxious distress (HCC)  Assessment & Plan  · Continue home antidepressant    Generalized anxiety disorder  Assessment & Plan  · Continue home Wellbutrin and BuSpar    Benign essential hypertension  Assessment & Plan  · Continue home blood pressure medications  · Continue to monitor    VTE Pharmacologic Prophylaxis: VTE Score: 3 Moderate Risk (Score 3-4) - Pharmacological DVT Prophylaxis Ordered: enoxaparin (Lovenox)  Code Status: Level 1 - Full Code   Discussion with family: Patient declined call to   Anticipated Length of Stay: Patient will be admitted on an observation basis with an anticipated length of stay of less than 2 midnights secondary to lip cellulitis       Total Time for Visit, including Counseling / Coordination of Care: 30 minutes Greater than 50% of this total time spent on direct patient counseling and coordination of care  Chief Complaint:  Lip swelling    History of Present Illness:  Alberto Jang is a 49 y o  female with a PMH of hypertension, anxiety, depression who presents with lip cellulitis  Patient states she had pimple on her lip and then had significant increase in swelling and pain around this  Patient was given Bactrim as an outpatient to help with this, but she states that worsened  Patient does states she had slight fever and chills yesterday  Patient otherwise feels well and denies any diarrhea, lightheadedness, dizziness, shortness of breath or decreased appetite at this time  Review of Systems:  Review of Systems   Constitutional: Negative for chills, fatigue, fever and unexpected weight change  HENT: Negative for congestion, ear pain, sore throat and tinnitus  Eyes: Negative for visual disturbance  Respiratory: Negative for cough, chest tightness, shortness of breath and wheezing  Cardiovascular: Negative for chest pain, palpitations and leg swelling  Gastrointestinal: Negative for abdominal pain, constipation, diarrhea, nausea and vomiting  Genitourinary: Negative for dysuria and frequency  Skin: Negative for rash  Neurological: Negative for dizziness, weakness, light-headedness, numbness and headaches  All other systems reviewed and are negative  Past Medical and Surgical History:   Past Medical History:   Diagnosis Date    Disease of thyroid gland     Hypertension        Past Surgical History:   Procedure Laterality Date     SECTION, LOW TRANSVERSE      Last Assessed: 5/15/2015    CHOLECYSTECTOMY      Last Assessed: 5/15/2015     IR UTERINE ARTERY EMBOLIZATION  2019    TOOTH EXTRACTION      Last Assessed: 5/15/2015       Meds/Allergies:  Prior to Admission medications    Medication Sig Start Date End Date Taking?  Authorizing Provider   buPROPion (WELLBUTRIN XL) 150 mg 24 hr tablet TAKE 1 TABLET BY MOUTH EVERY DAY IN THE MORNING 2/26/22  Yes Blanco Temple PA-C   busPIRone (BUSPAR) 5 mg tablet TAKE 1 TABLET BY MOUTH TWICE A DAY 2/28/22  Yes Blanco Temple PA-C   hydrochlorothiazide (HYDRODIURIL) 25 mg tablet TAKE 1 TABLET BY MOUTH EVERY DAY IN THE MORNING 2/26/22  Yes Blanco Temple PA-C   levothyroxine 150 mcg tablet TAKE 1 TABLET BY MOUTH EVERY DAY 5/24/21  Yes Blanco Temple PA-C   medroxyPROGESTERone (DEPO-PROVERA) 150 mg/mL injection INJECT 1 ML IN MUSCLE EVERY 3 MONTHS AS DIRECTED 1/14/22  Yes Blanco Temple PA-C   Omega-3 Fatty Acids ([employer] NATURAL FISH OIL) 1000 MG CAPS Take by mouth 6/20/16  Yes Historical Provider, MD   sulfamethoxazole-trimethoprim (BACTRIM DS) 800-160 mg per tablet Take 1 tablet by mouth every 12 (twelve) hours for 7 days 3/18/22 3/25/22 Yes Moriah Rodriguez DO   zolpidem (AMBIEN CR) 6 25 MG CR tablet TAKE 1 TABLET (6 25 MG TOTAL) BY MOUTH DAILY AT BEDTIME AS NEEDED FOR SLEEP 12/8/21  Yes Blanco Temple PA-C   montelukast (SINGULAIR) 10 mg tablet Take 10 mg by mouth daily at bedtime   Patient not taking: Reported on 3/20/2022  10/3/19   Historical Provider, MD     I have reviewed home medications with patient personally  Allergies:    Allergies   Allergen Reactions    Pollen Extract     Shrimp Extract Allergy Skin Test - Food Allergy        Social History:  Marital Status: /Civil Union   Patient Pre-hospital Living Situation: Home  Patient Pre-hospital Level of Mobility: walks  Substance Use History:   Social History     Substance and Sexual Activity   Alcohol Use Not Currently     Social History     Tobacco Use   Smoking Status Never Smoker   Smokeless Tobacco Never Used     Social History     Substance and Sexual Activity   Drug Use Never       Family History:  Family History   Problem Relation Age of Onset    No Known Problems Mother     No Known Problems Father        Physical Exam:     Vitals:   Blood Pressure: 144/85 (03/20/22 1310)  Pulse: (!) 109 (03/20/22 1310)  Temperature: 100 1 °F (37 8 °C) (03/20/22 1310)  Temp Source: Oral (03/20/22 1686)  Respirations: 17 (03/20/22 1310)  Height: 5' 6" (167 6 cm) (03/20/22 1310)  Weight - Scale: 94 4 kg (208 lb 1 8 oz) (03/20/22 1310)  SpO2: 97 % (03/20/22 1310)    Physical Exam  Vitals and nursing note reviewed  Constitutional:       General: She is not in acute distress  Appearance: She is well-developed  HENT:      Head: Normocephalic and atraumatic  Mouth/Throat:      Comments: Lip swelling noted with minimal tenderness to palpation and hard likely abscess palpated  Eyes:      General: No scleral icterus  Conjunctiva/sclera: Conjunctivae normal    Cardiovascular:      Rate and Rhythm: Normal rate and regular rhythm  Heart sounds: Normal heart sounds  No murmur heard  No friction rub  No gallop  Pulmonary:      Effort: Pulmonary effort is normal  No respiratory distress  Breath sounds: Normal breath sounds  No wheezing or rales  Abdominal:      General: Bowel sounds are normal  There is no distension  Palpations: Abdomen is soft  Tenderness: There is no abdominal tenderness  Musculoskeletal:         General: Normal range of motion  Skin:     General: Skin is warm  Findings: No rash  Neurological:      Mental Status: She is alert and oriented to person, place, and time            Additional Data:     Lab Results:  Results from last 7 days   Lab Units 03/20/22  0617   WBC Thousand/uL 11 00*   HEMOGLOBIN g/dL 14 4   HEMATOCRIT % 40 2   PLATELETS Thousands/uL 187   NEUTROS PCT % 79*   LYMPHS PCT % 13*   MONOS PCT % 7   EOS PCT % 1     Results from last 7 days   Lab Units 03/20/22  0617   SODIUM mmol/L 137   POTASSIUM mmol/L 3 5   CHLORIDE mmol/L 102   CO2 mmol/L 25   BUN mg/dL 9   CREATININE mg/dL 0 93   ANION GAP mmol/L 10   CALCIUM mg/dL 9 0   GLUCOSE RANDOM mg/dL 105                 Results from last 7 days   Lab Units 03/20/22  0617   LACTIC ACID mmol/L 0 8 Imaging: Reviewed radiology reports from this admission including: CT head  CT facial bones with contrast   Final Result by Shauna Arboleda DO (03/20 0033)   1  Cellulitis of the lower lip, asymmetric to the right  Phlegmonous changes crossing the midline  Early abscess formation not excluded  If warranted, consider follow-up plastic surgery or ENT consultation  2   7 mm enhancing extra-axial soft tissue mass in the left frontal lobe, most compatible with a meningioma  The study was marked in [facility] for immediate notification  Workstation performed: [id number]             ** Please Note: This note has been constructed using a voice recognition system   **

## 2022-03-20 NOTE — CONSULTS
GENERAL SURGERY CONSULT                                                                                                                                               David Beck 49 y o  female MRN:                                                                     [id number]  Unit/Bed#: ED 13                                                         Encounter: [id number]                                                        Assessment/Plan   Lowe lip abscess   Sepsis  Leukocytosis 11     -bedside I & D, packed with 1/4 inch ribbon gauze and secured with steri strip  Ok to leave without dressing    -cont Unasyn and Vanco, transition to p o  for total of 10 days   -pain control   -warm pack for comfort  -pt will follow up in surgery office 1 day after discharge          CHIEF COMPLAINT:  I have this swollen lip since Thursday, 4 days ago     HPI: David Beck is a 49y o  year oldfemale,PMH: with history of 2  facial abscesses, one required aspiration within the last year  Depression, FAUZIA, HTN,     consulted for abscess of lower lip  Pt states 4 days ago it started as a pimple on her lower lip and it was painful  She tried ambusol without relief   3 days ago she was started on Bactrim by her PCP and it continued to get bigger  Last night the pain escalated and became severe  She complained of increased swelling  On exam the lower lip is significantly enlarged  Pinpoint scab at lower  knob border  No drainage  Bedside I & D with expression of purulent drainage and sent for cultures   Imaging Studies: CT facial bones with contrast    Result Date: 3/20/2022  Impression: 1  Cellulitis of the lower lip, asymmetric to the right  Phlegmonous changes crossing the midline  Early abscess formation not excluded  If warranted, consider follow-up plastic surgery or ENT consultation  2   7 mm enhancing extra-axial soft tissue mass in the left frontal lobe, most compatible with a meningioma   The study was marked in [facility] for immediate notification  Workstation performed: [id number]     Lab Results:   Lab Results   Component Value Date    WBC 11 00 (H) 2022    WBC 5 7 2017    HGB 14 4 2022    HGB 13 3 2017    HCT 40 2 2022    HCT 39 5 2017     2022     2017     Lab Results   Component Value Date     2017    K 3 5 2022    K 4 5 2017     2022     2017    CO2 25 2022    CO2 25 2017    BUN 9 2022    BUN 12 2017    CREATININE 0 93 2022    CREATININE 0 71 2017       Inpatient consult to Acute Care Surgery  Consult performed by: Manfred Nicolas PA-C  Consult ordered by:  Viviane Diggs DO          Historical Information   Past Medical History:   Diagnosis Date    Disease of thyroid gland     Hypertension      Past Surgical History:   Procedure Laterality Date     SECTION, LOW TRANSVERSE      Last Assessed: 5/15/2015    CHOLECYSTECTOMY      Last Assessed: 5/15/2015     IR UTERINE ARTERY EMBOLIZATION  2019    TOOTH EXTRACTION      Last Assessed: 5/15/2015     Social History   Social History     Substance and Sexual Activity   Alcohol Use Not Currently     Social History     Substance and Sexual Activity   Drug Use Never     Social History     Tobacco Use   Smoking Status Never Smoker   Smokeless Tobacco Never Used     Family History: non-contributory    Allergies   Allergen Reactions    Pollen Extract     Shrimp Extract Allergy Skin Test - Food Allergy        Meds/Allergies   current meds:   Current Facility-Administered Medications   Medication Dose Route Frequency    acetaminophen (TYLENOL) tablet 650 mg  650 mg Oral Q6H PRN    ampicillin-sulbactam (UNASYN) 3 g in sodium chloride 0 9 % 100 mL IVPB  3 g Intravenous Q6H    buPROPion (WELLBUTRIN XL) 24 hr tablet 150 mg  150 mg Oral QAM    busPIRone (BUSPAR) tablet 5 mg  5 mg Oral BID    [START ON 3/21/2022] enoxaparin (LOVENOX) subcutaneous injection 40 mg  40 mg Subcutaneous Daily    hydrochlorothiazide (HYDRODIURIL) tablet 25 mg  25 mg Oral QAM    levothyroxine tablet 150 mcg  150 mcg Oral Daily    oxyCODONE (ROXICODONE) IR tablet 5 mg  5 mg Oral Q4H PRN    oxyCODONE (ROXICODONE) IR tablet 7 5 mg  7 5 mg Oral Q4H PRN    vancomycin (VANCOCIN) 1,250 mg in sodium chloride 0 9 % 250 mL IVPB  17 5 mg/kg (Adjusted) Intravenous Q12H    zolpidem (AMBIEN) tablet 5 mg  5 mg Oral HS PRN              Objective   Vitals:    Intake/Output Summary (Last 24 hours) at 3/20/2022 1050  Last data filed at 3/20/2022 0486  Gross per 24 hour   Intake 50 ml   Output --   Net 50 ml     Invasive Devices  Report    Peripheral Intravenous Line            Peripheral IV 03/20/22 Right Antecubital <1 day                Review of Systems  12 point ROS reviewed and Negative except[de-identified]   Lower lip with an abscess since Thursday, 4 days ago  It got bigger while on bactrim     Physical Exam    Blood pressure 125/66, pulse 101, temperature 98 3 °F (36 8 °C), temperature source Oral, resp  rate 18, SpO2 97 %, not currently breastfeeding  GEN: A & O x 3, cooperative   HEENT: PERRLA EOMI, sclera anicterus    Lower lip: significant soft tissue edema/enlargement  Pinpoint scab at vermillion border  Fluctuant midline  I & D and expressed purulent drainage,  Packed with 1/4 inch ribbon gauze  NECK: supple   LUNGS: clear throughout  COR: RRR no murmur  ABD: soft NT   EXTREM: FROM no joint deformities    No pedal edema   NEURO: CN II -XII intact, no tremor, affect appropriate            Roselyn JUAN Plascencia  3/20/2022

## 2022-03-20 NOTE — PLAN OF CARE
Problem: Potential for Falls  Goal: Patient will remain free of falls  Description: INTERVENTIONS:  - Educate patient/family on patient safety including physical limitations  - Instruct patient to call for assistance with activity   - Consult OT/PT to assist with strengthening/mobility   - Keep Call bell within reach  - Keep bed low and locked with side rails adjusted as appropriate  - Keep care items and personal belongings within reach  - Initiate and maintain comfort rounds  - Make Fall Risk Sign visible to staff  - Offer Toileting every Hours, in advance of need  - Initiate/Maintain alarm  - Obtain necessary fall risk management equipment:   - Apply yellow socks and bracelet for high fall risk patients  - Consider moving patient to room near nurses station  Outcome: Progressing     Problem: PAIN - ADULT  Goal: Verbalizes/displays adequate comfort level or baseline comfort level  Description: Interventions:  - Encourage patient to monitor pain and request assistance  - Assess pain using appropriate pain scale  - Administer analgesics based on type and severity of pain and evaluate response  - Implement non-pharmacological measures as appropriate and evaluate response  - Consider cultural and social influences on pain and pain management  - Notify physician/advanced practitioner if interventions unsuccessful or patient reports new pain  Outcome: Progressing     Problem: INFECTION - ADULT  Goal: Absence or prevention of progression during hospitalization  Description: INTERVENTIONS:  - Assess and monitor for signs and symptoms of infection  - Monitor lab/diagnostic results  - Monitor all insertion sites, i e  indwelling lines, tubes, and drains  - Monitor endotracheal if appropriate and nasal secretions for changes in amount and color  - Kamuela appropriate cooling/warming therapies per order  - Administer medications as ordered  - Instruct and encourage patient and family to use good hand hygiene technique  - Identify and instruct in appropriate isolation precautions for identified infection/condition  Outcome: Progressing  Goal: Absence of fever/infection during neutropenic period  Description: INTERVENTIONS:  - Monitor WBC    Outcome: Progressing     Problem: SAFETY ADULT  Goal: Patient will remain free of falls  Description: INTERVENTIONS:  - Educate patient/family on patient safety including physical limitations  - Instruct patient to call for assistance with activity   - Consult OT/PT to assist with strengthening/mobility   - Keep Call bell within reach  - Keep bed low and locked with side rails adjusted as appropriate  - Keep care items and personal belongings within reach  - Initiate and maintain comfort rounds  - Make Fall Risk Sign visible to staff  - Offer Toileting every Hours, in advance of need  - Initiate/Maintain alarm  - Obtain necessary fall risk management equipment:   - Apply yellow socks and bracelet for high fall risk patients  - Consider moving patient to room near nurses station  Outcome: Progressing  Goal: Maintain or return to baseline ADL function  Description: INTERVENTIONS:  -  Assess patient's ability to carry out ADLs; assess patient's baseline for ADL function and identify physical deficits which impact ability to perform ADLs (bathing, care of mouth/teeth, toileting, grooming, dressing, etc )  - Assess/evaluate cause of self-care deficits   - Assess range of motion  - Assess patient's mobility; develop plan if impaired  - Assess patient's need for assistive devices and provide as appropriate  - Encourage maximum independence but intervene and supervise when necessary  - Involve family in performance of ADLs  - Assess for home care needs following discharge   - Consider OT consult to assist with ADL evaluation and planning for discharge  - Provide patient education as appropriate  Outcome: Progressing  Goal: Maintains/Returns to pre admission functional level  Description: INTERVENTIONS:  - Perform BMAT or MOVE assessment daily    - Set and communicate daily mobility goal to care team and patient/family/caregiver  - Collaborate with rehabilitation services on mobility goals if consulted  - Perform Range of Motion times a day  - Reposition patient every  hours  - Dangle patient  times a day  - Stand patient times a day  - Ambulate patient  times a day  - Out of bed to chair  times a day   - Out of bed for meals  times a day  - Out of bed for toileting  - Record patient progress and toleration of activity level   Outcome: Progressing     Problem: DISCHARGE PLANNING  Goal: Discharge to home or other facility with appropriate resources  Description: INTERVENTIONS:  - Identify barriers to discharge w/patient and caregiver  - Arrange for needed discharge resources and transportation as appropriate  - Identify discharge learning needs (meds, wound care, etc )  - Arrange for interpretive services to assist at discharge as needed  - Refer to Case Management Department for coordinating discharge planning if the patient needs post-hospital services based on physician/advanced practitioner order or complex needs related to functional status, cognitive ability, or social support system  Outcome: Progressing     Problem: Knowledge Deficit  Goal: Patient/family/caregiver demonstrates understanding of disease process, treatment plan, medications, and discharge instructions  Description: Complete learning assessment and assess knowledge base    Interventions:  - Provide teaching at level of understanding  - Provide teaching via preferred learning methods  Outcome: Progressing

## 2022-03-20 NOTE — ED PROVIDER NOTES
History  Chief Complaint   Patient presents with    Lip Swelling     pt arrived ambulatory with c/o lower lip swelling that started on thursday morning as a pimple  pt was seen by a doctor and received an antibiotic on friday, bactrim, and has increasingly worse since that time  HPI    Prior to Admission Medications   Prescriptions Last Dose Informant Patient Reported? Taking?    Omega-3 Fatty Acids ([employer] NATURAL FISH OIL) 1000 MG CAPS  Self Yes No   Sig: Take by mouth   buPROPion (WELLBUTRIN XL) 150 mg 24 hr tablet   No No   Sig: TAKE 1 TABLET BY MOUTH EVERY DAY IN THE MORNING   busPIRone (BUSPAR) 5 mg tablet   No No   Sig: TAKE 1 TABLET BY MOUTH TWICE A DAY   hydrochlorothiazide (HYDRODIURIL) 25 mg tablet   No No   Sig: TAKE 1 TABLET BY MOUTH EVERY DAY IN THE MORNING   levothyroxine 150 mcg tablet   No No   Sig: TAKE 1 TABLET BY MOUTH EVERY DAY   medroxyPROGESTERone (DEPO-PROVERA) 150 mg/mL injection   No No   Sig: INJECT 1 ML IN MUSCLE EVERY 3 MONTHS AS DIRECTED   montelukast (SINGULAIR) 10 mg tablet   Yes No   Sig: Take 10 mg by mouth daily at bedtime    sulfamethoxazole-trimethoprim (BACTRIM DS) 800-160 mg per tablet   No No   Sig: Take 1 tablet by mouth every 12 (twelve) hours for 7 days   zolpidem (AMBIEN CR) 6 25 MG CR tablet   No No   Sig: TAKE 1 TABLET (6 25 MG TOTAL) BY MOUTH DAILY AT BEDTIME AS NEEDED FOR SLEEP      Facility-Administered Medications: None       Past Medical History:   Diagnosis Date    Disease of thyroid gland     Hypertension        Past Surgical History:   Procedure Laterality Date     SECTION, LOW TRANSVERSE      Last Assessed: 5/15/2015    CHOLECYSTECTOMY      Last Assessed: 5/15/2015     IR UTERINE ARTERY EMBOLIZATION  2019    TOOTH EXTRACTION      Last Assessed: 5/15/2015       Family History   Problem Relation Age of Onset    No Known Problems Mother     No Known Problems Father      I have reviewed and agree with the history as documented  E-Cigarette/Vaping     E-Cigarette/Vaping Substances     Social History     Tobacco Use    Smoking status: Never Smoker    Smokeless tobacco: Never Used   Substance Use Topics    Alcohol use: Not Currently    Drug use: Never       Review of Systems    Physical Exam  Physical Exam  Vitals and nursing note reviewed  Constitutional:       General: She is not in acute distress  Appearance: She is well-developed  She is obese  Comments: Febrile   HENT:      Head: Normocephalic and atraumatic  Mouth/Throat:      Mouth: Mucous membranes are moist       Dentition: No gingival swelling or gum lesions  Pharynx: No pharyngeal swelling, oropharyngeal exudate or posterior oropharyngeal erythema  Comments: Significant swelling of the lip with tenderness  Mild cracking of the skin to the left  No induration or fluctuance  Mild tenderness along the gumline without swelling or focal abscess  No submandibular or sublingual fullness  No other intraoral edema  Eyes:      Conjunctiva/sclera: Conjunctivae normal       Pupils: Pupils are equal, round, and reactive to light  Neck:      Trachea: Phonation normal  No tracheal deviation  Cardiovascular:      Rate and Rhythm: Regular rhythm  Tachycardia present  Heart sounds: Normal heart sounds  Pulmonary:      Effort: Pulmonary effort is normal  No respiratory distress  Breath sounds: Normal breath sounds  Abdominal:      General: There is no distension  Palpations: Abdomen is soft  Tenderness: There is no abdominal tenderness  Musculoskeletal:      Cervical back: Normal range of motion  Lymphadenopathy:      Cervical: No cervical adenopathy  Skin:     General: Skin is warm and dry  Neurological:      Mental Status: She is alert and oriented to person, place, and time  GCS: GCS eye subscore is 4  GCS verbal subscore is 5  GCS motor subscore is 6     Psychiatric:         Behavior: Behavior normal  Vital Signs  ED Triage Vitals   Temperature Pulse Respirations Blood Pressure SpO2   03/20/22 0540 03/20/22 0540 03/20/22 0540 03/20/22 0540 03/20/22 0540   (!) 101 5 °F (38 6 °C) (!) 112 19 164/87 96 %      Temp Source Heart Rate Source Patient Position - Orthostatic VS BP Location FiO2 (%)   03/20/22 0540 03/20/22 0540 03/20/22 0540 03/20/22 0540 --   Oral Monitor Lying Right arm       Pain Score       03/20/22 0627       9           Vitals:    03/20/22 0540 03/20/22 0754   BP: 164/87 120/63   Pulse: (!) 112 103   Patient Position - Orthostatic VS: Lying Sitting         Visual Acuity      ED Medications  Medications   lactated ringers bolus 1,000 mL (1,000 mL Intravenous New Bag 3/20/22 0758)   clindamycin (CLEOCIN) IVPB (premix in dextrose) 600 mg 50 mL (0 mg Intravenous Stopped 3/20/22 0755)   ketorolac (TORADOL) injection 15 mg (15 mg Intravenous Given 3/20/22 0627)   iohexol (OMNIPAQUE) 350 MG/ML injection (SINGLE-DOSE) 85 mL (85 mL Intravenous Given 3/20/22 0700)       Diagnostic Studies  Results Reviewed     Procedure Component Value Units Date/Time    Lactic acid [[id number]]  (Normal) Collected: 03/20/22 0617    Lab Status: Final result Specimen: Blood from Arm, Right Updated: 03/20/22 0643     LACTIC ACID 0 8 mmol/L     Narrative:      Result may be elevated if tourniquet was used during collection      Basic metabolic panel [[id number]] Collected: 03/20/22 0617    Lab Status: Final result Specimen: Blood from Arm, Right Updated: 03/20/22 1434     Sodium 137 mmol/L      Potassium 3 5 mmol/L      Chloride 102 mmol/L      CO2 25 mmol/L      ANION GAP 10 mmol/L      BUN 9 mg/dL      Creatinine 0 93 mg/dL      Glucose 105 mg/dL      Calcium 9 0 mg/dL      eGFR 71 ml/min/1 73sq m     Narrative:      [place] guidelines for Chronic Kidney Disease (CKD):     Stage 1 with normal or high GFR (GFR > 90 mL/min/1 73 square meters)    Stage 2 Mild CKD (GFR = 60-89 mL/min/1 73 square meters)    Stage 3A Moderate CKD (GFR = 45-59 mL/min/1 73 square meters)    Stage 3B Moderate CKD (GFR = 30-44 mL/min/1 73 square meters)    Stage 4 Severe CKD (GFR = 15-29 mL/min/1 73 square meters)    Stage 5 End Stage CKD (GFR <15 mL/min/1 73 square meters)  Note: GFR calculation is accurate only with a steady state creatinine    CBC and differential [[id number]]  (Abnormal) Collected: 03/20/22 0617    Lab Status: Final result Specimen: Blood from Arm, Left Updated: 03/20/22 0625     WBC 11 00 Thousand/uL      RBC 4 54 Million/uL      Hemoglobin 14 4 g/dL      Hematocrit 40 2 %      MCV 89 fL      MCH 31 7 pg      MCHC 35 8 g/dL      RDW 12 7 %      MPV 10 6 fL      Platelets 149 Thousands/uL      nRBC 0 /100 WBCs      Neutrophils Relative 79 %      Immat GRANS % 0 %      Lymphocytes Relative 13 %      Monocytes Relative 7 %      Eosinophils Relative 1 %      Basophils Relative 0 %      Neutrophils Absolute 8 65 Thousands/µL      Immature Grans Absolute 0 03 Thousand/uL      Lymphocytes Absolute 1 42 Thousands/µL      Monocytes Absolute 0 75 Thousand/µL      Eosinophils Absolute 0 11 Thousand/µL      Basophils Absolute 0 04 Thousands/µL     Blood culture #1 [[id number]] Collected: 03/20/22 0617    Lab Status: In process Specimen: Blood from Arm, Left Updated: 03/20/22 0622    Blood culture #2 [[id number]] Collected: 03/20/22 0617    Lab Status: In process Specimen: Blood from Arm, Right Updated: 03/20/22 0622                 CT facial bones with contrast   Final Result by Dwight Ortega DO (03/20 0740)   1  Cellulitis of the lower lip, asymmetric to the right  Phlegmonous changes crossing the midline  Early abscess formation not excluded  If warranted, consider follow-up plastic surgery or ENT consultation  2   7 mm enhancing extra-axial soft tissue mass in the left frontal lobe, most compatible with a meningioma  The study was marked in [facility] for immediate notification        Workstation performed: [id number]                    Procedures  Procedures         ED Course                               SBIRT 20yo+      Most Recent Value   SBIRT (24 yo +)    In order to provide better care to our patients, we are screening all of our patients for alcohol and drug use  Would it be okay to ask you these screening questions? Yes Filed at: 03/20/2022 0541   Initial Alcohol Screen: US AUDIT-C     1  How often do you have a drink containing alcohol? 0 Filed at: 03/20/2022 0541   2  How many drinks containing alcohol do you have on a typical day you are drinking? 0 Filed at: 03/20/2022 0541   3a  Male UNDER 65: How often do you have five or more drinks on one occasion? 0 Filed at: 03/20/2022 0541   3b  FEMALE Any Age, or MALE 65+: How often do you have 4 or more drinks on one occassion? 0 Filed at: 03/20/2022 0541   Audit-C Score 0 Filed at: 03/20/2022 0720   NOY: How many times in the past year have you    Used an illegal drug or used a prescription medication for non-medical reasons? Never Filed at: 03/20/2022 0541                    MDM  Number of Diagnoses or Management Options  Cellulitis, lip: new and requires workup  Failure of outpatient treatment: new and requires workup  Fever: new and requires workup  Leukocytosis: new and requires workup  Lip swelling: new and requires workup  Sepsis [facility]): new and requires workup  Diagnosis management comments: This is a 52-year-old female who presents here today for evaluation of lip swelling  She says she has had several episodes of cellulitis and abscesses to her face before, but no history of this anywhere else  She says on 03/17 she had a "pimple" on her lower left that got worse by the following morning  She was seen by her PCP and prescribed Bactrim which has worked for her before  She says she has had progressive pain and swelling since then    She has been applying topical lidocaine to the outside and anbesol to the inner lip with some improvement of pain, however it recurs rapidly  She denies trauma to the area  She did develop dental pain overnight which she attributes to the pressure on the gums from the swelling  She denies fevers at home, nausea, vomiting, other systemic symptoms  She denies underlying medical problems  She said she was having difficulty sleeping last night because of the pain, so came in today for evaluation  Review of systems:  Otherwise negative unless stated above    She is well appearing, in no acute distress  She has significant swelling to the lower left with erythema and cracking of the skin  She has mild tenderness along the gum line  There is no fluctuance or induration  Exam is otherwise unremarkable  She does meet SIRS criteria based on fever and tachycardia, however has a normal blood pressure  We will check lab work and CT scan to evaluate for possible drainable abscess  She has mild leukocytosis 11 normal lactic acid  She has lower lip cellulitis with phlegmonous changes, unable to exclude early abscess but not currently amenable to drainage which she would need surgical evaluation at this time  She will be admitted for IV antibiotics and further management  I updated her on findings and plan of care, and she expresses understanding         Amount and/or Complexity of Data Reviewed  Clinical lab tests: reviewed and ordered  Tests in the radiology section of CPT®: ordered and reviewed  Discuss the patient with other providers: yes  Independent visualization of images, tracings, or specimens: yes        Disposition  Final diagnoses:   Cellulitis, lip   Lip swelling - lower   Sepsis ([address] )   Fever   Leukocytosis   Failure of outpatient treatment     Time reflects when diagnosis was documented in both MDM as applicable and the Disposition within this note     Time User Action Codes Description Comment    3/20/2022  7:50 AM Bernadette Little Add [K13 0] Cellulitis, lip     3/20/2022  7:51 AM Noel-Tesoriero, Cindia Folks Add [R22 0] Lip swelling     3/20/2022  7:51 AM Noel-Tesoriero, Cindia Folks Modify [R22 0] Lip swelling lower    3/20/2022  7:51 AM Noel-Tesoriero, Cindia Folks Add [A41 9] Sepsis ([address] )     3/20/2022  7:51 AM Noel-Tesoriero, Cindia Folks Add [R50 9] Fever     3/20/2022  7:51 AM Noel-Tesoriero, Cindia Folks Add [B07 463] Leukocytosis     3/20/2022  7:52 AM Noel-Tesoriero, Cindia Folks Add [Z78 9] Failure of outpatient treatment       ED Disposition     ED Disposition Condition Date/Time Comment    Admit Stable [place] Mar 20, 2022  7:47 AM Case was discussed with Dr Tamiko Conde and the patient's admission status was agreed to be Admission Status: inpatient status to the service of Dr Tamiko Conde   Follow-up Information    None         Patient's Medications   Discharge Prescriptions    No medications on file       No discharge procedures on file      PDMP Review       Value Time User    PDMP Reviewed  Yes 12/8/2021  8:05 AM Jamia Barahona PA-C          ED Provider  Electronically Signed by           Estelita Best MD  03/20/22 5015

## 2022-03-20 NOTE — ASSESSMENT & PLAN NOTE
· Patient states she had pimple noted on her lip last Thursday which has been worsening over the past couple days    Patient was prescribed Bactrim as an outpatient with no improvement  · CT facial bone-cellulitis of lower lip, asymmetric to the right; phlegmonous changes crossing midline  · Fever noted on admission  · WBC 11    Plan  · Given cellulitis started as pimple and is noted on inside of patient's mouth will cover with Unasyn and vancomycin at this time  · Blood cultures x2 drawn  · General surgery consulted for likely aspiration  · If aspirated will follow-up cultures  · Continue to monitor for worsening signs of infection

## 2022-03-20 NOTE — PROGRESS NOTES
Pt reports that packing has fallen out of lip wound  All of packing has fallen out, Alerted [employer] PA-c with surgery  She will come see pt, will continue to monitor

## 2022-03-20 NOTE — PROGRESS NOTES
Vancomycin Assessment    Kolby Milton is a 49 y o  female who is currently receiving vancomycin 1250 mg IV q 12 H for skin-soft tissue infection     Relevant clinical data and objective history reviewed:  Creatinine   Date Value Ref Range Status   03/20/2022 0 93 0 60 - 1 30 mg/dL Final     Comment:     Standardized to IDMS reference method   11/10/2021 1 05 0 60 - 1 30 mg/dL Final     Comment:     Standardized to IDMS reference method   08/26/2020 1 02 0 60 - 1 30 mg/dL Final     Comment:     Standardized to IDMS reference method   01/17/2017 0 71 0 50 - 1 10 mg/dL Final   05/27/2016 0 76 0 50 - 1 10 mg/dL Final     /66 (BP Location: Right arm)   Pulse 101   Temp 98 3 °F (36 8 °C) (Oral)   Resp 18   SpO2 97%   No intake/output data recorded  Lab Results   Component Value Date/Time    BUN 9 03/20/2022 06:17 AM    BUN 12 01/17/2017 08:09 AM    WBC 11 00 (H) 03/20/2022 06:17 AM    WBC 5 7 01/17/2017 08:09 AM    HGB 14 4 03/20/2022 06:17 AM    HGB 13 3 01/17/2017 08:09 AM    HCT 40 2 03/20/2022 06:17 AM    HCT 39 5 01/17/2017 08:09 AM    MCV 89 03/20/2022 06:17 AM    MCV 91 8 01/17/2017 08:09 AM     03/20/2022 06:17 AM     01/17/2017 08:09 AM     Temp Readings from Last 3 Encounters:   03/20/22 98 3 °F (36 8 °C) (Oral)   03/18/22 98 6 °F (37 °C)   11/17/21 97 8 °F (36 6 °C)     Vancomycin Days of Therapy: 1    Assessment/Plan  The patient is currently on vancomycin utilizing scheduled dosing based on adjusted body weight (due to obesity)  Baseline risks associated with therapy include: concomitant nephrotoxic medications  The patient is currently receiving 1250 mg IV q 12 H and is clinically appropriate and dose will be continued  Pharmacy will also follow closely for s/sx of nephrotoxicity, infusion reactions, and appropriateness of therapy  BMP and CBC will be ordered per protocol    Plan for trough as patient approaches steady state, prior to the 4th  dose at approximately 2100 on 3/21/22  Due to infection severity, will target a trough of 15-20 (appropriate for most indications)   Pharmacy will continue to follow the patients culture results and clinical progress daily      Allie Tamayo, Pharmacist

## 2022-03-21 LAB
ANION GAP SERPL CALCULATED.3IONS-SCNC: 10 MMOL/L (ref 4–13)
BASOPHILS # BLD AUTO: 0.03 THOUSANDS/ΜL (ref 0–0.1)
BASOPHILS NFR BLD AUTO: 0 % (ref 0–1)
BUN SERPL-MCNC: 9 MG/DL (ref 5–25)
CALCIUM SERPL-MCNC: 8.5 MG/DL (ref 8.3–10.1)
CHLORIDE SERPL-SCNC: 106 MMOL/L (ref 100–108)
CO2 SERPL-SCNC: 25 MMOL/L (ref 21–32)
CREAT SERPL-MCNC: 0.78 MG/DL (ref 0.6–1.3)
EOSINOPHIL # BLD AUTO: 0.12 THOUSAND/ΜL (ref 0–0.61)
EOSINOPHIL NFR BLD AUTO: 2 % (ref 0–6)
ERYTHROCYTE [DISTWIDTH] IN BLOOD BY AUTOMATED COUNT: 12.7 % (ref 11.6–15.1)
GFR SERPL CREATININE-BSD FRML MDRD: 88 ML/MIN/1.73SQ M
GLUCOSE SERPL-MCNC: 106 MG/DL (ref 65–140)
HCT VFR BLD AUTO: 34.8 % (ref 34.8–46.1)
HGB BLD-MCNC: 12.3 G/DL (ref 11.5–15.4)
IMM GRANULOCYTES # BLD AUTO: 0.02 THOUSAND/UL (ref 0–0.2)
IMM GRANULOCYTES NFR BLD AUTO: 0 % (ref 0–2)
LYMPHOCYTES # BLD AUTO: 2 THOUSANDS/ΜL (ref 0.6–4.47)
LYMPHOCYTES NFR BLD AUTO: 26 % (ref 14–44)
MCH RBC QN AUTO: 31.5 PG (ref 26.8–34.3)
MCHC RBC AUTO-ENTMCNC: 35.3 G/DL (ref 31.4–37.4)
MCV RBC AUTO: 89 FL (ref 82–98)
MONOCYTES # BLD AUTO: 0.55 THOUSAND/ΜL (ref 0.17–1.22)
MONOCYTES NFR BLD AUTO: 7 % (ref 4–12)
NEUTROPHILS # BLD AUTO: 5.03 THOUSANDS/ΜL (ref 1.85–7.62)
NEUTS SEG NFR BLD AUTO: 65 % (ref 43–75)
NRBC BLD AUTO-RTO: 0 /100 WBCS
PLATELET # BLD AUTO: 162 THOUSANDS/UL (ref 149–390)
PMV BLD AUTO: 10.4 FL (ref 8.9–12.7)
POTASSIUM SERPL-SCNC: 3.7 MMOL/L (ref 3.5–5.3)
RBC # BLD AUTO: 3.91 MILLION/UL (ref 3.81–5.12)
SODIUM SERPL-SCNC: 141 MMOL/L (ref 136–145)
VANCOMYCIN TROUGH SERPL-MCNC: 8 UG/ML (ref 10–20)
WBC # BLD AUTO: 7.75 THOUSAND/UL (ref 4.31–10.16)

## 2022-03-21 PROCEDURE — 99232 SBSQ HOSP IP/OBS MODERATE 35: CPT | Performed by: STUDENT IN AN ORGANIZED HEALTH CARE EDUCATION/TRAINING PROGRAM

## 2022-03-21 PROCEDURE — 85025 COMPLETE CBC W/AUTO DIFF WBC: CPT | Performed by: INTERNAL MEDICINE

## 2022-03-21 PROCEDURE — 99232 SBSQ HOSP IP/OBS MODERATE 35: CPT | Performed by: INTERNAL MEDICINE

## 2022-03-21 PROCEDURE — 80048 BASIC METABOLIC PNL TOTAL CA: CPT | Performed by: INTERNAL MEDICINE

## 2022-03-21 PROCEDURE — 80202 ASSAY OF VANCOMYCIN: CPT | Performed by: INTERNAL MEDICINE

## 2022-03-21 RX ORDER — LEVOTHYROXINE SODIUM 0.15 MG/1
150 TABLET ORAL
Status: DISCONTINUED | OUTPATIENT
Start: 2022-03-22 | End: 2022-03-22 | Stop reason: HOSPADM

## 2022-03-21 RX ADMIN — AMPICILLIN SODIUM AND SULBACTAM SODIUM 3 G: 2; 1 INJECTION, POWDER, FOR SOLUTION INTRAMUSCULAR; INTRAVENOUS at 00:06

## 2022-03-21 RX ADMIN — AMPICILLIN SODIUM AND SULBACTAM SODIUM 3 G: 2; 1 INJECTION, POWDER, FOR SOLUTION INTRAMUSCULAR; INTRAVENOUS at 17:23

## 2022-03-21 RX ADMIN — AMPICILLIN SODIUM AND SULBACTAM SODIUM 3 G: 2; 1 INJECTION, POWDER, FOR SOLUTION INTRAMUSCULAR; INTRAVENOUS at 23:36

## 2022-03-21 RX ADMIN — VANCOMYCIN HYDROCHLORIDE 1250 MG: 5 INJECTION, POWDER, LYOPHILIZED, FOR SOLUTION INTRAVENOUS at 20:58

## 2022-03-21 RX ADMIN — HYDROCHLOROTHIAZIDE 25 MG: 25 TABLET ORAL at 08:48

## 2022-03-21 RX ADMIN — BUPROPION 150 MG: 150 TABLET, EXTENDED RELEASE ORAL at 08:45

## 2022-03-21 RX ADMIN — AMPICILLIN SODIUM AND SULBACTAM SODIUM 3 G: 2; 1 INJECTION, POWDER, FOR SOLUTION INTRAMUSCULAR; INTRAVENOUS at 05:26

## 2022-03-21 RX ADMIN — AMPICILLIN SODIUM AND SULBACTAM SODIUM 3 G: 2; 1 INJECTION, POWDER, FOR SOLUTION INTRAMUSCULAR; INTRAVENOUS at 12:41

## 2022-03-21 RX ADMIN — ENOXAPARIN SODIUM 40 MG: 40 INJECTION SUBCUTANEOUS at 08:45

## 2022-03-21 RX ADMIN — BUSPIRONE HYDROCHLORIDE 5 MG: 5 TABLET ORAL at 17:23

## 2022-03-21 RX ADMIN — LEVOTHYROXINE SODIUM 150 MCG: 150 TABLET ORAL at 08:47

## 2022-03-21 RX ADMIN — BUSPIRONE HYDROCHLORIDE 5 MG: 5 TABLET ORAL at 08:45

## 2022-03-21 RX ADMIN — VANCOMYCIN HYDROCHLORIDE 1250 MG: 5 INJECTION, POWDER, LYOPHILIZED, FOR SOLUTION INTRAVENOUS at 09:18

## 2022-03-21 NOTE — PLAN OF CARE
Problem: Potential for Falls  Goal: Patient will remain free of falls  Description: INTERVENTIONS:  - Educate patient/family on patient safety including physical limitations  - Instruct patient to call for assistance with activity   - Consult OT/PT to assist with strengthening/mobility   - Keep Call bell within reach  - Keep bed low and locked with side rails adjusted as appropriate  - Keep care items and personal belongings within reach  - Initiate and maintain comfort rounds  - Make Fall Risk Sign visible to staff  - Apply yellow socks and bracelet for high fall risk patients  - Consider moving patient to room near nurses station  Outcome: Progressing     Problem: PAIN - ADULT  Goal: Verbalizes/displays adequate comfort level or baseline comfort level  Description: Interventions:  - Encourage patient to monitor pain and request assistance  - Assess pain using appropriate pain scale  - Administer analgesics based on type and severity of pain and evaluate response  - Implement non-pharmacological measures as appropriate and evaluate response  - Consider cultural and social influences on pain and pain management  - Notify physician/advanced practitioner if interventions unsuccessful or patient reports new pain  Outcome: Progressing     Problem: INFECTION - ADULT  Goal: Absence or prevention of progression during hospitalization  Description: INTERVENTIONS:  - Assess and monitor for signs and symptoms of infection  - Monitor lab/diagnostic results  - Monitor all insertion sites, i e  indwelling lines, tubes, and drains  - Monitor endotracheal if appropriate and nasal secretions for changes in amount and color  - [place] appropriate cooling/warming therapies per order  - Administer medications as ordered  - Instruct and encourage patient and family to use good hand hygiene technique  - Identify and instruct in appropriate isolation precautions for identified infection/condition  Outcome: Progressing  Goal: Absence of fever/infection during neutropenic period  Description: INTERVENTIONS:  - Monitor WBC    Outcome: Progressing     Problem: SAFETY ADULT  Goal: Patient will remain free of falls  Description: INTERVENTIONS:  - Educate patient/family on patient safety including physical limitations  - Instruct patient to call for assistance with activity   - Consult OT/PT to assist with strengthening/mobility   - Keep Call bell within reach  - Keep bed low and locked with side rails adjusted as appropriate  - Keep care items and personal belongings within reach  - Initiate and maintain comfort rounds  - Make Fall Risk Sign visible to staff  - Apply yellow socks and bracelet for high fall risk patients  - Consider moving patient to room near nurses station  Outcome: Progressing  Goal: Maintain or return to baseline ADL function  Description: INTERVENTIONS:  -  Assess patient's ability to carry out ADLs; assess patient's baseline for ADL function and identify physical deficits which impact ability to perform ADLs (bathing, care of mouth/teeth, toileting, grooming, dressing, etc )  - Assess/evaluate cause of self-care deficits   - Assess range of motion  - Assess patient's mobility; develop plan if impaired  - Assess patient's need for assistive devices and provide as appropriate  - Encourage maximum independence but intervene and supervise when necessary  - Involve family in performance of ADLs  - Assess for home care needs following discharge   - Consider OT consult to assist with ADL evaluation and planning for discharge  - Provide patient education as appropriate  Outcome: Progressing  Goal: Maintains/Returns to pre admission functional level  Description: INTERVENTIONS:  - Perform BMAT or MOVE assessment daily    - Set and communicate daily mobility goal to care team and patient/family/caregiver  - Collaborate with rehabilitation services on mobility goals if consulted  - Perform Range of Motion 4 times a day    - Reposition patient every 2 hours  - Dangle patient 3 times a day  - Stand patient 3 times a day  - Ambulate patient 3 times a day  - Out of bed to chair 3 times a day   - Out of bed for meals 3 times a day  - Out of bed for toileting  - Record patient progress and toleration of activity level   Outcome: Progressing     Problem: DISCHARGE PLANNING  Goal: Discharge to home or other facility with appropriate resources  Description: INTERVENTIONS:  - Identify barriers to discharge w/patient and caregiver  - Arrange for needed discharge resources and transportation as appropriate  - Identify discharge learning needs (meds, wound care, etc )  - Arrange for interpretive services to assist at discharge as needed  - Refer to Case Management Department for coordinating discharge planning if the patient needs post-hospital services based on physician/advanced practitioner order or complex needs related to functional status, cognitive ability, or social support system  Outcome: Progressing     Problem: Knowledge Deficit  Goal: Patient/family/caregiver demonstrates understanding of disease process, treatment plan, medications, and discharge instructions  Description: Complete learning assessment and assess knowledge base    Interventions:  - Provide teaching at level of understanding  - Provide teaching via preferred learning methods  Outcome: Progressing

## 2022-03-21 NOTE — CASE MANAGEMENT
Case Management Assessment & Discharge Planning Note    Patient name Blair Gotti  Location /-49 MRN [id number]  : 1970 Date 3/21/2022       Current Admission Date: 3/20/2022  Current Admission Diagnosis:Cellulitis of lip   Patient Active Problem List    Diagnosis Date Noted    Cellulitis of lip 2022    Sepsis ([facility] ) 2022    Annual physical exam 2021    Insomnia 2021    Major depressive disorder, single episode, mild with anxious distress ([facility] ) 2020    Generalized anxiety disorder 2019    Transition of care performed with sharing of clinical summary 2019    Disorder of both eustachian tubes 2019    Leiomyoma 2019    Pelvic pain 2019    Heart murmur 2019    Seasonal allergic rhinitis due to pollen 2019    Intramural, submucous, and subserous leiomyoma of uterus 2018    Hypertriglyceridemia without hypercholesterolemia 2016    Benign essential hypertension 2016    Hypothyroidism 2014    Abnormal mammogram 2014      LOS (days): 0  Geometric Mean LOS (GMLOS) (days):   Days to GMLOS:     OBJECTIVE:              Current admission status: Inpatient       Preferred Pharmacy:   [facility] [address] [place] [address]  Phone: [phone] Fax: [phone]    Primary Care Provider: Antonio Thomas PA-C    Primary Insurance: [employer]  Secondary Insurance:     ASSESSMENT:  [address] [facility], [address] Representative - Spouse   Primary Phone: [phone] (Home)               Advance Directives  Does patient have a [address]?: Yes (Requested documentation)  Does patient have Advance Directives?: Yes  Advance Directives: Power of  for health care  Primary Contact: Alf Neville ([phone])    Readmission Root Cause  30 Day Readmission: No    Patient Information  Admitted from[de-identified] Home  Mental Status: Alert  During Assessment patient was accompanied by: Not accompanied during assessment  Assessment information provided by[de-identified] Patient  Primary Caregiver: Self  Support Systems: Spouse/significant other  [place] of Residence: [place] [address] do you live in?:  [address] entry access options   Select all that apply : Stairs  Number of steps to enter home : 2  Do the steps have railings?: Yes  Type of Current Residence: [place]  In the last 12 months, was there a time when you were not able to pay the mortgage or rent on time?: No  In the last 12 months, how many places have you lived?: 1  In the last 12 months, was there a time when you did not have a steady place to sleep or slept in a shelter (including now)?: No  Homeless/housing insecurity resource given?: N/A  Living Arrangements: Lives w/ Spouse/significant other    Activities of Daily Living Prior to Admission  Functional Status: Independent  Completes ADLs independently?: Yes  Ambulates independently?: Yes  Does patient use assisted devices?: No  Does patient currently own DME?: No  Does patient have a history of Outpatient Therapy (PT/OT)?: No  Does the patient have a history of Short-Term Rehab?: No  Does patient have a history of HHC?: No  Does patient currently have [place] [address]?: No    Patient Information Continued  Income Source: Employed  Does patient have prescription coverage?: Yes  Within the past 12 months, you worried that your food would run out before you got the money to buy more : Never true  Within the past 12 months, the food you bought just didnt last and you didnt have money to get more : Never true  Food insecurity resource given?: N/A  Does patient receive dialysis treatments?: No  Does patient have a history of substance abuse?: No  Does patient have a history of Mental Health Diagnosis?: Yes (Anxiety, depression)  Is patient receiving treatment for mental health?: Yes (Meds by PCP)  Has patient received inpatient treatment related to mental health in the last 2 years?: No    Means of Transportation  Means of Transport to Appts[de-identified] Drives Self  In the past 12 months, has lack of transportation kept you from medical appointments or from getting medications?: No  In the past 12 months, has lack of transportation kept you from meetings, work, or from getting things needed for daily living?: No  Was application for public transport provided?: N/A    DISCHARGE DETAILS:    Discharge planning discussed with[de-identified] Patient  Freedom of Choice: Yes  Comments - Freedom of Choice: Discussed freedom of choice as it relates to discharge planning based on treatment team recommendations  CM contacted family/caregiver?: No- see comments (Pt declined, reports she will update )  Were Treatment Team discharge recommendations reviewed with patient/caregiver?: Yes  Did patient/caregiver verbalize understanding of patient care needs?: Yes  Were patient/caregiver advised of the risks associated with not following Treatment Team discharge recommendations?: Yes    Contacts  Patient Contacts: Patient  Contact Method: In Person  Reason/Outcome: Continuity of [address]         Is the patient interested in [place] [address] at discharge?: No    DME Referral Provided  Referral made for DME?: No    Other Referral/Resources/Interventions Provided:  Interventions: None Indicated    Treatment Team Recommendation: Home  Discharge Destination Plan[de-identified] Home  Transport at Discharge : Family     Additional Comments: Pt reports being vaccinated for Covid including booster

## 2022-03-21 NOTE — ASSESSMENT & PLAN NOTE
· CT facial bone-cellulitis of lower lip, asymmetric to the right; phlegmonous changes crossing midline  · No fevers noted overnight  · WBC within normal limits    Plan  · Continue vancomycin and Unasyn  · Blood cultures x2 drawn and pending  · I and D by General surgery 3/20  · fluid culture showing rare gram-positive cocci in pairs  · Continue to monitor for worsening signs of infection

## 2022-03-21 NOTE — ASSESSMENT & PLAN NOTE
· CT facial bone-cellulitis of lower lip, asymmetric to the right; phlegmonous changes crossing midline  · Patient resting comfortably on exam with no overnight events or fevers    Plan  · Blood cultures x2 no growth at 1 day  · Fluid culture growing Staph aureus  · Will discharge on doxycycline to complete 7 days antibiotics  · I and D by General surgery 3/20  · Outpatient follow-up with General surgery

## 2022-03-21 NOTE — PROGRESS NOTES
[address]  Progress Note - Albert Vazquez 1970, 49 y o  female MRN: [id number]  Unit/Bed#: -01 Encounter: [id number]  Primary Care Provider: Charly Mcarthur PA-C   Date and time admitted to hospital: 3/20/2022  5:35 AM    * Cellulitis of lip  Assessment & Plan  · CT facial bone-cellulitis of lower lip, asymmetric to the right; phlegmonous changes crossing midline  · No fevers noted overnight  · WBC within normal limits    Plan  · Continue vancomycin and Unasyn  · Blood cultures x2 drawn and pending  · I and D by General surgery 3/20  · fluid culture showing rare gram-positive cocci in pairs  · Continue to monitor for worsening signs of infection    Sepsis ([facility] )  Assessment & Plan  · As noted by fever, tachycardia and elevated white count  · Plan as above    Major depressive disorder, single episode, mild with anxious distress ([facility] )  Assessment & Plan  · Continue home antidepressant    Generalized anxiety disorder  Assessment & Plan  · Continue home Wellbutrin and BuSpar    Benign essential hypertension  Assessment & Plan  · Continue home blood pressure medications  · Continue to monitor        VTE Pharmacologic Prophylaxis: VTE Score: 3 Moderate Risk (Score 3-4) - Pharmacological DVT Prophylaxis Ordered: enoxaparin (Lovenox)  Patient Centered Rounds: I performed bedside rounds with nursing staff today  Discussions with Specialists or Other Care Team Provider: general surgery    Education and Discussions with Family / Patient: Patient declined call to   Time Spent for Care: 30 minutes  More than 50% of total time spent on counseling and coordination of care as described above  Current Length of Stay: 0 day(s)  Current Patient Status: Observation   Certification Statement: The patient will continue to require additional inpatient hospital stay due to Lip cellulitis  Discharge Plan: Anticipate discharge tomorrow to home      Code Status: Level 1 - Full Code    Subjective:   Patient resting comfortably on the examination  Patient had no overnight events or complaints on exam   She states that her lip is feeling much better compared to yesterday  Objective:     Vitals:   Temp (24hrs), Av °F (37 2 °C), Min:98 3 °F (36 8 °C), Max:100 1 °F (37 8 °C)    Temp:  [98 3 °F (36 8 °C)-100 1 °F (37 8 °C)] 98 5 °F (36 9 °C)  HR:  [] 96  Resp:  [17-20] 20  BP: (115-144)/(66-87) 144/87  SpO2:  [96 %-98 %] 97 %  Body mass index is 33 59 kg/m²  Input and Output Summary (last 24 hours): Intake/Output Summary (Last 24 hours) at 3/21/2022 1232  Last data filed at 3/21/2022 0900  Gross per 24 hour   Intake 440 ml   Output --   Net 440 ml       Physical Exam:   Physical Exam  Vitals and nursing note reviewed  Constitutional:       General: She is not in acute distress  Appearance: She is well-developed  HENT:      Head: Normocephalic and atraumatic  Mouth/Throat:      Comments: Lower lip cellulitis  Eyes:      General: No scleral icterus  Conjunctiva/sclera: Conjunctivae normal    Cardiovascular:      Rate and Rhythm: Normal rate and regular rhythm  Heart sounds: Normal heart sounds  No murmur heard  No friction rub  No gallop  Pulmonary:      Effort: Pulmonary effort is normal  No respiratory distress  Breath sounds: Normal breath sounds  No wheezing or rales  Abdominal:      General: Bowel sounds are normal  There is no distension  Palpations: Abdomen is soft  Tenderness: There is no abdominal tenderness  Musculoskeletal:         General: Normal range of motion  Skin:     General: Skin is warm  Findings: No rash  Neurological:      Mental Status: She is alert and oriented to person, place, and time            Additional Data:     Labs:  Results from last 7 days   Lab Units 22  0532   WBC Thousand/uL 7 75   HEMOGLOBIN g/dL 12 3   HEMATOCRIT % 34 8   PLATELETS Thousands/uL 162   NEUTROS PCT % 65   LYMPHS PCT % 26   MONOS PCT % 7   EOS PCT % 2     Results from last 7 days   Lab Units 03/21/22  0532   SODIUM mmol/L 141   POTASSIUM mmol/L 3 7   CHLORIDE mmol/L 106   CO2 mmol/L 25   BUN mg/dL 9   CREATININE mg/dL 0 78   ANION GAP mmol/L 10   CALCIUM mg/dL 8 5   GLUCOSE RANDOM mg/dL 106                 Results from last 7 days   Lab Units 03/20/22  0617   LACTIC ACID mmol/L 0 8       Lines/Drains:  Invasive Devices  Report    Peripheral Intravenous Line            Peripheral IV 03/20/22 Right Antecubital 1 day                      Imaging: No pertinent imaging reviewed  Recent Cultures (last 7 days):   Results from last 7 days   Lab Units 03/20/22  1151 03/20/22  0617   BLOOD CULTURE   --  Received in Microbiology Lab  Culture in Progress  Received in Microbiology Lab  Culture in Progress  GRAM STAIN RESULT  No Polys*  Rare Gram positive cocci in pairs*  --        Last 24 Hours Medication List:   Current Facility-Administered Medications   Medication Dose Route Frequency Provider Last Rate    acetaminophen  650 mg Oral Q6H PRN Astrid Josiesten, DO      ampicillin-sulbactam  3 g Intravenous Q6H Astrid Josiesten, DO 3 g (03/21/22 0526)    buPROPion  150 mg Oral QAM Astridhelen Galindosten, DO      busPIRone  5 mg Oral BID Astrid Josiesten, DO      enoxaparin  40 mg Subcutaneous Daily Astrid Josiesten, DO      hydrochlorothiazide  25 mg Oral QAM Astrid Josiesten, DO      [START ON 3/22/2022] levothyroxine  150 mcg Oral Early Morning Astrid Josiesten, DO      oxyCODONE  5 mg Oral Q4H PRN Astrid Chasten, DO      oxyCODONE  7 5 mg Oral Q4H PRN Astrid Chasten, DO      vancomycin  17 5 mg/kg (Adjusted) Intravenous Q12H Astrid Josiesten, DO 1,250 mg (03/21/22 9956)    zolpidem  5 mg Oral HS PRN Astridhelen Hassan DO          Today, Patient Was Seen By: Astrid Hassan DO    **Please Note: This note may have been constructed using a voice recognition system  **

## 2022-03-21 NOTE — PROGRESS NOTES
Progress Note - General Surgery   Shantal Huffman 49 y o  female MRN: [id number]  Unit/Bed#: -01 Encounter: [id number]    Assessment/Plan    Lowe lip abscess   Sepsis  Leukocytosis 11 on admission, 7 today   Cultures rare GPC, final still pending   Afebrile   Tachycardia resolved     -cont Vanco  -cont packing, with continuous wiping of drainage, the packing was out again, it started wound closure, opened with forceps because there is not a significant decrease in size  Repacked with 1/4 ribbon gauze    -probable discharge in 24 hours    -pt will follow up in Dr Laura Deras office 2 days after discharge  Chief Complaint: it is definitely getting smaller, it keeps draining a lot, I keep wiping it away  Objective/Exam: Blood pressure 144/87, pulse 96, temperature 98 5 °F (36 9 °C), resp  rate 20, height 5' 6" (1 676 m), weight 94 4 kg (208 lb 1 8 oz), SpO2 97 %, not currently breastfeeding  Wound Culure: No results found for: WOUNDCULT      General Appearance:    Alert and orientated x 3, cooperative, no distress    Lower Lip with soft tissue edema, abscess, packing was out when examined and the wound started to close  Opened with forceps and repacked due to size and retained purulence        Lungs:     Clear to auscultation bilaterally, respirations unlabored    Heart:    Regular rate and rhythm   Abdomen:     Soft          Extremities:   Extremities normal,  no cyanosis or edema   Pulses:   2+ and symmetric all extremities, no calf tenderness   Skin:   Skin color, texture, turgor normal, no rashes or lesions   Neurologic:   CNII-XII intact, normal strength, sensation and reflexes     Throughout, affect appropriate       ,      Intake/Output Summary (Last 24 hours) at 3/21/2022 0905  Last data filed at 3/21/2022 1812  Gross per 24 hour   Intake 1320 ml   Output --   Net 1320 ml       Invasive Devices  Report    Peripheral Intravenous Line            Peripheral IV 03/20/22 Right Antecubital 1 day Labs: CBC with diff: @RESUFAST(WBC,HGB,HCT,MCV,PLT,ADJUSTEDWBC,   RBC,MCH,MCHC,RDW,MPV,NRBC,TOTALCELLSCOUNTED,SEGS%,GRANS%,LYMPHS%,EOS%,BASO%,ABNEUT,ABGRANS,ABLYMPHS,ABMOMOS,ABEOS,ABBASO)@,   BMP/CMP:  Lab Results   Component Value Date     01/17/2017    K 3 7 03/21/2022    K 4 5 01/17/2017     03/21/2022     01/17/2017    CO2 25 03/21/2022    CO2 25 01/17/2017    BUN 9 03/21/2022    BUN 12 01/17/2017    CREATININE 0 78 03/21/2022    CREATININE 0 71 01/17/2017    CALCIUM 8 5 03/21/2022    CALCIUM 9 0 01/17/2017    AST 19 11/10/2021    AST 17 01/17/2017    ALT 42 11/10/2021    ALT 16 01/17/2017    ALKPHOS 44 (L) 11/10/2021    ALKPHOS 30 (L) 01/17/2017    PROT 6 7 01/17/2017    BILITOT 0 6 01/17/2017    EGFR 88 03/21/2022   ,   Lipid Panel:   Lab Results   Component Value Date    CHOL 177 05/27/2016   ,   Coags:   Lab Results   Component Value Date    INR 0 95 04/22/2019   ,     Blood Culture:   Lab Results   Component Value Date    BLOODCX Received in Microbiology Lab  Culture in Progress  03/20/2022    BLOODCX Received in Microbiology Lab  Culture in Progress  03/20/2022   ,   Urinalysis:   Lab Results   Component Value Date    COLORU Yellow 04/26/2019    CLARITYU Cloudy 04/26/2019    SPECGRAV 1 020 04/26/2019    PHUR 7 0 04/26/2019    LEUKOCYTESUR Negative 04/26/2019    NITRITE Negative 04/26/2019    GLUCOSEU Negative 04/26/2019    KETONESU Trace (A) 04/26/2019    BILIRUBINUR Negative 04/26/2019    BLOODU Large (A) 04/26/2019   ,   Urine Culture: No results found for: URINECX,         Imaging: CT facial bones with contrast    Result Date: 3/20/2022  Impression: 1  Cellulitis of the lower lip, asymmetric to the right  Phlegmonous changes crossing the midline  Early abscess formation not excluded  If warranted, consider follow-up plastic surgery or ENT consultation   2   7 mm enhancing extra-axial soft tissue mass in the left frontal lobe, most compatible with a meningioma  The study was marked in [facility] for immediate notification   Workstation performed: [id number]         Sharon Watson PA-C  3/21/2022

## 2022-03-21 NOTE — PLAN OF CARE
Problem: PAIN - ADULT  Goal: Verbalizes/displays adequate comfort level or baseline comfort level  Description: Interventions:  - Encourage patient to monitor pain and request assistance  - Assess pain using appropriate pain scale  - Administer analgesics based on type and severity of pain and evaluate response  - Implement non-pharmacological measures as appropriate and evaluate response  - Consider cultural and social influences on pain and pain management  - Notify physician/advanced practitioner if interventions unsuccessful or patient reports new pain  Outcome: Progressing     Problem: INFECTION - ADULT  Goal: Absence or prevention of progression during hospitalization  Description: INTERVENTIONS:  - Assess and monitor for signs and symptoms of infection  - Monitor lab/diagnostic results  - Monitor all insertion sites, i e  indwelling lines, tubes, and drains  - Monitor endotracheal if appropriate and nasal secretions for changes in amount and color  - [place] appropriate cooling/warming therapies per order  - Administer medications as ordered  - Instruct and encourage patient and family to use good hand hygiene technique  - Identify and instruct in appropriate isolation precautions for identified infection/condition  Outcome: Progressing  Goal: Absence of fever/infection during neutropenic period  Description: INTERVENTIONS:  - Monitor WBC    Outcome: Progressing

## 2022-03-21 NOTE — UTILIZATION REVIEW
Initial Clinical Review    OBS order 3/20 0819 converted to IP on 3/21 @ 1233 for continued treatment of cellulitis     03/21/22 1233  Inpatient Admission  Once         03/21/22 1233   03/20/22 0820  Place in Observation  Once         03/20/22 0819       ED Arrival Information     Expected Arrival Acuity    - 3/20/2022 05:22 Urgent         Means of arrival Escorted by Service Admission type    Walk-In Family Member Hospitalist Urgent         Arrival complaint    lip swelling        Chief Complaint   Patient presents with    Lip Swelling     pt arrived ambulatory with c/o lower lip swelling that started on thursday morning as a pimple  pt was seen by a doctor and received an antibiotic on friday, bactrim, and has increasingly worse since that time  Initial Presentation: 50 yo female to ED from home w/ lip cellulitis   Given bactrim as OP w/o relief   Slight fever and chills yest   Admitted OBS w/ cellulitis of lip plan for unasyn , vanco , f/u BC , general surgery consult for likely aspiration , f/u cx   Cont meds for HTN and anxiety   3/20 Surgery Consult   Lower lip abscess , sepsis bedside I&D completed , cont abx , pain control , warm pack for comfort   3/21 IM Note   Cont stay for IV abx , BC pending   Fld cx showing rare gram + cocci in pairs , monitor for worsening signs of infection        ED Triage Vitals   Temperature Pulse Respirations Blood Pressure SpO2   03/20/22 0540 03/20/22 0540 03/20/22 0540 03/20/22 0540 03/20/22 0540   (!) 101 5 °F (38 6 °C) (!) 112 19 164/87 96 %      Temp Source Heart Rate Source Patient Position - Orthostatic VS BP Location FiO2 (%)   03/20/22 0540 03/20/22 0540 03/20/22 0540 03/20/22 0540 --   Oral Monitor Lying Right arm       Pain Score       03/20/22 0627       9          Wt Readings from Last 1 Encounters:   03/20/22 94 4 kg (208 lb 1 8 oz)     Additional Vital Signs:   03/20/22 23:37:58 98 3 °F (36 8 °C) 86 20 115/66 82 96 % -- --   03/20/22 15:30:39 99 4 °F (37 4 °C) 114 Abnormal  -- 136/81 99 97 % -- --   03/20/22 1359 -- -- -- -- -- -- None (Room air) --   03/20/22 13:10:01 100 1 °F (37 8 °C) 109 Abnormal  17 144/85 105 97 % -- --   03/20/22 1236 -- 110 Abnormal  18 134/72 97 98 % None (Room air) Sitting   03/20/22 1030 -- 93 18 111/67 84 97 % None (Room air) Sitting   03/20/22 0900 -- 101 18 125/66 86 97 % None (Room air) Sitting   03/20/22 0807 -- -- -- -- -- -- None (Room air) --   03/20/22 0754 98 3 °F (36 8 °C) 103 18 120/63 83 97 % None (Room air) Sitting   03/20/22 0551 -- -- -- -- -- -- None (Room air)        Pertinent Labs/Diagnostic Test Results:   CT facial bones with contrast   Final Result by Fritz Pink DO (03/20 0740)   1  Cellulitis of the lower lip, asymmetric to the right  Phlegmonous changes crossing the midline  Early abscess formation not excluded  If warranted, consider follow-up plastic surgery or ENT consultation  2   7 mm enhancing extra-axial soft tissue mass in the left frontal lobe, most compatible with a meningioma  The study was marked in [facility] for immediate notification        Workstation performed: [id number]               Results from last 7 days   Lab Units 03/21/22  0532 03/20/22  0617   WBC Thousand/uL 7 75 11 00*   HEMOGLOBIN g/dL 12 3 14 4   HEMATOCRIT % 34 8 40 2   PLATELETS Thousands/uL 162 187   NEUTROS ABS Thousands/µL 5 03 8 65*     Results from last 7 days   Lab Units 03/21/22  0532 03/20/22  0617   SODIUM mmol/L 141 137   POTASSIUM mmol/L 3 7 3 5   CHLORIDE mmol/L 106 102   CO2 mmol/L 25 25   ANION GAP mmol/L 10 10   BUN mg/dL 9 9   CREATININE mg/dL 0 78 0 93   EGFR ml/min/1 73sq m 88 71   CALCIUM mg/dL 8 5 9 0     Results from last 7 days   Lab Units 03/21/22  0532 03/20/22  0617   GLUCOSE RANDOM mg/dL 106 105     Results from last 7 days   Lab Units 03/20/22  0617   LACTIC ACID mmol/L 0 8        Results from last 7 days   Lab Units 03/20/22  0617   BLOOD CULTURE  Received in Microbiology Lab  Culture in Progress  Received in Microbiology Lab  Culture in Progress  ED Treatment:   Medication Administration from 03/20/2022 0522 to 03/20/2022 1251       Date/Time Order Dose Route Action     03/20/2022 0629 clindamycin (CLEOCIN) IVPB (premix in dextrose) 600 mg 50 mL 600 mg Intravenous New Bag     03/20/2022 0758 lactated ringers bolus 1,000 mL 1,000 mL Intravenous New Bag     03/20/2022 0627 ketorolac (TORADOL) injection 15 mg 15 mg Intravenous Given     03/20/2022 1058 vancomycin (VANCOCIN) 1,250 mg in sodium chloride 0 9 % 250 mL IVPB 1,250 mg Intravenous New Bag     03/20/2022 1110 lidocaine (PF) (XYLOCAINE-MPF) 1 % injection 10 mL 10 mL Infiltration Given by Other        Past Medical History:   Diagnosis Date    Disease of thyroid gland     Hypertension      Present on Admission:   Benign essential hypertension   Generalized anxiety disorder   Major depressive disorder, single episode, mild with anxious distress (HCC)      Admitting Diagnosis: Leukocytosis [D72 829]  Cellulitis, lip [K13 0]  Lip swelling [R22 0]  Fever [R50 9]  Sepsis ([facility] [address] ) [A41 9]  Failure of outpatient treatment [Z78 9]  Age/Sex: 49 y o  female  Admission Orders:  Scheduled Medications:  ampicillin-sulbactam, 3 g, Intravenous, Q6H  buPROPion, 150 mg, Oral, QAM  busPIRone, 5 mg, Oral, BID  enoxaparin, 40 mg, Subcutaneous, Daily  hydrochlorothiazide, 25 mg, Oral, QAM  levothyroxine, 150 mcg, Oral, Daily  vancomycin, 17 5 mg/kg (Adjusted), Intravenous, Q12H      Continuous IV Infusions:     PRN Meds:  acetaminophen, 650 mg, Oral, Q6H PRN  oxyCODONE, 5 mg, Oral, Q4H PRN  oxyCODONE, 7 5 mg, Oral, Q4H PRN  zolpidem, 5 mg, Oral, HS PRN        IP CONSULT TO ACUTE CARE SURGERY  IP CONSULT TO PHARMACY    Network Utilization Review Department  ATTENTION: Please call with any questions or concerns to [phone] and carefully listen to the prompts so that you are directed to the right person   All voicemails are confidential   Prasanth Santa all requests for admission clinical reviews, approved or denied determinations and any other requests to dedicated fax number below belonging to the campus where the patient is receiving treatment   List of dedicated fax numbers for the Facilities:  [address] [place] [address] DENIALS (Administrative/Medical Necessity) [phone]   [address] (Maternity/NICU/Pediatrics) [phone]   [address]  [address] [id number] [address] [place] [address] [phone]

## 2022-03-22 VITALS
DIASTOLIC BLOOD PRESSURE: 97 MMHG | WEIGHT: 208.11 LBS | BODY MASS INDEX: 33.45 KG/M2 | SYSTOLIC BLOOD PRESSURE: 145 MMHG | TEMPERATURE: 98.1 F | HEIGHT: 66 IN | OXYGEN SATURATION: 95 % | HEART RATE: 89 BPM | RESPIRATION RATE: 18 BRPM

## 2022-03-22 LAB
BACTERIA SPEC ANAEROBE CULT: NORMAL
BASOPHILS # BLD AUTO: 0.05 THOUSANDS/ΜL (ref 0–0.1)
BASOPHILS NFR BLD AUTO: 1 % (ref 0–1)
EOSINOPHIL # BLD AUTO: 0.21 THOUSAND/ΜL (ref 0–0.61)
EOSINOPHIL NFR BLD AUTO: 3 % (ref 0–6)
ERYTHROCYTE [DISTWIDTH] IN BLOOD BY AUTOMATED COUNT: 12.6 % (ref 11.6–15.1)
HCT VFR BLD AUTO: 40.5 % (ref 34.8–46.1)
HGB BLD-MCNC: 13.4 G/DL (ref 11.5–15.4)
IMM GRANULOCYTES # BLD AUTO: 0.02 THOUSAND/UL (ref 0–0.2)
IMM GRANULOCYTES NFR BLD AUTO: 0 % (ref 0–2)
LYMPHOCYTES # BLD AUTO: 2.44 THOUSANDS/ΜL (ref 0.6–4.47)
LYMPHOCYTES NFR BLD AUTO: 36 % (ref 14–44)
MCH RBC QN AUTO: 30.9 PG (ref 26.8–34.3)
MCHC RBC AUTO-ENTMCNC: 33.1 G/DL (ref 31.4–37.4)
MCV RBC AUTO: 94 FL (ref 82–98)
MONOCYTES # BLD AUTO: 0.49 THOUSAND/ΜL (ref 0.17–1.22)
MONOCYTES NFR BLD AUTO: 7 % (ref 4–12)
NEUTROPHILS # BLD AUTO: 3.64 THOUSANDS/ΜL (ref 1.85–7.62)
NEUTS SEG NFR BLD AUTO: 53 % (ref 43–75)
NRBC BLD AUTO-RTO: 0 /100 WBCS
PLATELET # BLD AUTO: 205 THOUSANDS/UL (ref 149–390)
PMV BLD AUTO: 10.6 FL (ref 8.9–12.7)
RBC # BLD AUTO: 4.33 MILLION/UL (ref 3.81–5.12)
WBC # BLD AUTO: 6.85 THOUSAND/UL (ref 4.31–10.16)

## 2022-03-22 PROCEDURE — 99239 HOSP IP/OBS DSCHRG MGMT >30: CPT | Performed by: INTERNAL MEDICINE

## 2022-03-22 PROCEDURE — 85025 COMPLETE CBC W/AUTO DIFF WBC: CPT | Performed by: INTERNAL MEDICINE

## 2022-03-22 PROCEDURE — 99232 SBSQ HOSP IP/OBS MODERATE 35: CPT | Performed by: SURGERY

## 2022-03-22 RX ORDER — DOXYCYCLINE 100 MG/1
100 TABLET ORAL 2 TIMES DAILY
Qty: 12 TABLET | Refills: 0 | Status: SHIPPED | OUTPATIENT
Start: 2022-03-22 | End: 2022-03-28

## 2022-03-22 RX ORDER — DOXYCYCLINE 100 MG/1
100 TABLET ORAL 2 TIMES DAILY
Qty: 12 TABLET | Refills: 0 | Status: SHIPPED | OUTPATIENT
Start: 2022-03-22 | End: 2022-03-22 | Stop reason: SDUPTHER

## 2022-03-22 RX ADMIN — BUSPIRONE HYDROCHLORIDE 5 MG: 5 TABLET ORAL at 10:00

## 2022-03-22 RX ADMIN — ENOXAPARIN SODIUM 40 MG: 40 INJECTION SUBCUTANEOUS at 10:00

## 2022-03-22 RX ADMIN — VANCOMYCIN HYDROCHLORIDE 1250 MG: 5 INJECTION, POWDER, LYOPHILIZED, FOR SOLUTION INTRAVENOUS at 07:47

## 2022-03-22 RX ADMIN — AMPICILLIN SODIUM AND SULBACTAM SODIUM 3 G: 2; 1 INJECTION, POWDER, FOR SOLUTION INTRAMUSCULAR; INTRAVENOUS at 05:19

## 2022-03-22 RX ADMIN — LEVOTHYROXINE SODIUM 150 MCG: 150 TABLET ORAL at 05:19

## 2022-03-22 RX ADMIN — ACETAMINOPHEN 650 MG: 325 TABLET, FILM COATED ORAL at 05:27

## 2022-03-22 RX ADMIN — BUPROPION 150 MG: 150 TABLET, EXTENDED RELEASE ORAL at 10:00

## 2022-03-22 RX ADMIN — HYDROCHLOROTHIAZIDE 25 MG: 25 TABLET ORAL at 10:00

## 2022-03-22 NOTE — APP STUDENT NOTE
Progress Note - General Surgery   Daljit Madera 49 y o  female MRN: [id number]  Unit/Bed#: -01 Encounter: [id number]    Assessment:  Daljit Madera is a 49 y o  female s/p lower lip abscess I &D  Labs- WBC 6 85 (7 73)    Plan:   Continue antibiotics   Repack abscess, discharge patient home with oral antibiotics ***    Subjective/Objective    Subjective: Patient continues to complain of mild lower lip discomfort but denies any fatigue, weakness, chest pain, or SOB  She states the incision continues to drain, her packing was changed once yesterday  Objective:     Blood pressure 151/85, pulse 87, temperature 98 4 °F (36 9 °C), resp  rate 19, height 5' 6" (1 676 m), weight 94 4 kg (208 lb 1 8 oz), SpO2 94 %, not currently breastfeeding  ,Body mass index is 33 59 kg/m²  Intake/Output Summary (Last 24 hours) at 3/22/2022 0734  Last data filed at 3/22/2022 0101  Gross per 24 hour   Intake 460 ml   Output 300 ml   Net 160 ml       Invasive Devices  Report    Peripheral Intravenous Line            Peripheral IV 03/21/22 Left Antecubital <1 day                Physical Exam:   GEN: NAD  HEENT: NCAT, MMM  CV: RRR, no m/r/g  Lung: Normal effort, CTA B/L, no w/r/r  Ab: Soft, NT/ND  Extrem: No CCE   Neuro: A+Ox3   Skin: Lower lip swelling s/p abscess drainage, packing in place covered with steri strips  No active drainage noted  Lab, Imaging and other studies:I have personally reviewed pertinent lab results       VTE Pharmacologic Prophylaxis: Sequential compression device (Venodyne)   VTE Mechanical Prophylaxis: sequential compression device    Recent Results (from the past 36 hour(s))   Basic metabolic panel    Collection Time: 03/21/22  5:32 AM   Result Value Ref Range    Sodium 141 136 - 145 mmol/L    Potassium 3 7 3 5 - 5 3 mmol/L    Chloride 106 100 - 108 mmol/L    CO2 25 21 - 32 mmol/L    ANION GAP 10 4 - 13 mmol/L    BUN 9 5 - 25 mg/dL    Creatinine 0 78 0 60 - 1 30 mg/dL    Glucose 106 65 - 140 mg/dL    Calcium 8 5 8 3 - 10 1 mg/dL    eGFR 88 ml/min/1 73sq m   CBC and differential    Collection Time: 03/21/22  5:32 AM   Result Value Ref Range    WBC 7 75 4 31 - 10 16 Thousand/uL    RBC 3 91 3 81 - 5 12 Million/uL    Hemoglobin 12 3 11 5 - 15 4 g/dL    Hematocrit 34 8 34 8 - 46 1 %    MCV 89 82 - 98 fL    MCH 31 5 26 8 - 34 3 pg    MCHC 35 3 31 4 - 37 4 g/dL    RDW 12 7 11 6 - 15 1 %    MPV 10 4 8 9 - 12 7 fL    Platelets 991 534 - 160 Thousands/uL    nRBC 0 /100 WBCs    Neutrophils Relative 65 43 - 75 %    Immat GRANS % 0 0 - 2 %    Lymphocytes Relative 26 14 - 44 %    Monocytes Relative 7 4 - 12 %    Eosinophils Relative 2 0 - 6 %    Basophils Relative 0 0 - 1 %    Neutrophils Absolute 5 03 1 85 - 7 62 Thousands/µL    Immature Grans Absolute 0 02 0 00 - 0 20 Thousand/uL    Lymphocytes Absolute 2 00 0 60 - 4 47 Thousands/µL    Monocytes Absolute 0 55 0 17 - 1 22 Thousand/µL    Eosinophils Absolute 0 12 0 00 - 0 61 Thousand/µL    Basophils Absolute 0 03 0 00 - 0 10 Thousands/µL   Vancomycin, trough Collect vancomycin trough 30 minutes before 2130 dose due on 3/21/22  Call pharmacy when level arrives back  Do not wait to administer next dose  Thank you      Collection Time: 03/21/22  8:47 PM   Result Value Ref Range    Vancomycin Tr 8 0 (L) 10 0 - 20 0 ug/mL   CBC and differential    Collection Time: 03/22/22  5:01 AM   Result Value Ref Range    WBC 6 85 4 31 - 10 16 Thousand/uL    RBC 4 33 3 81 - 5 12 Million/uL    Hemoglobin 13 4 11 5 - 15 4 g/dL    Hematocrit 40 5 34 8 - 46 1 %    MCV 94 82 - 98 fL    MCH 30 9 26 8 - 34 3 pg    MCHC 33 1 31 4 - 37 4 g/dL    RDW 12 6 11 6 - 15 1 %    MPV 10 6 8 9 - 12 7 fL    Platelets 683 016 - 407 Thousands/uL    nRBC 0 /100 WBCs    Neutrophils Relative 53 43 - 75 %    Immat GRANS % 0 0 - 2 %    Lymphocytes Relative 36 14 - 44 %    Monocytes Relative 7 4 - 12 %    Eosinophils Relative 3 0 - 6 %    Basophils Relative 1 0 - 1 %    Neutrophils Absolute 3 64 1 85 - 7 62 Thousands/µL    Immature Grans Absolute 0 02 0 00 - 0 20 Thousand/uL    Lymphocytes Absolute 2 44 0 60 - 4 47 Thousands/µL    Monocytes Absolute 0 49 0 17 - 1 22 Thousand/µL    Eosinophils Absolute 0 21 0 00 - 0 61 Thousand/µL    Basophils Absolute 0 05 0 00 - 0 10 Thousands/µL

## 2022-03-22 NOTE — PROGRESS NOTES
Vancomycin Assessment    Melvin Bowen is a 49 y o  female who is currently receiving vancomycin 1250 mg iv q 12 hours for skin-soft tissue infection     Relevant clinical data and objective history reviewed:  Creatinine   Date Value Ref Range Status   03/21/2022 0 78 0 60 - 1 30 mg/dL Final     Comment:     Standardized to IDMS reference method   03/20/2022 0 93 0 60 - 1 30 mg/dL Final     Comment:     Standardized to IDMS reference method   11/10/2021 1 05 0 60 - 1 30 mg/dL Final     Comment:     Standardized to IDMS reference method   01/17/2017 0 71 0 50 - 1 10 mg/dL Final   05/27/2016 0 76 0 50 - 1 10 mg/dL Final     /85   Pulse 87   Temp 98 4 °F (36 9 °C)   Resp 19   Ht 5' 6" (1 676 m)   Wt 94 4 kg (208 lb 1 8 oz)   SpO2 94%   BMI 33 59 kg/m²   I/O last 3 completed shifts: In: 8956 [P O :680; IV Piggyback:1050]  Out: 300 [Urine:300]  Lab Results   Component Value Date/Time    BUN 9 03/21/2022 05:32 AM    BUN 12 01/17/2017 08:09 AM    WBC 6 85 03/22/2022 05:01 AM    WBC 5 7 01/17/2017 08:09 AM    HGB 13 4 03/22/2022 05:01 AM    HGB 13 3 01/17/2017 08:09 AM    HCT 40 5 03/22/2022 05:01 AM    HCT 39 5 01/17/2017 08:09 AM    MCV 94 03/22/2022 05:01 AM    MCV 91 8 01/17/2017 08:09 AM     03/22/2022 05:01 AM     01/17/2017 08:09 AM     Temp Readings from Last 3 Encounters:   03/21/22 98 4 °F (36 9 °C)   03/18/22 98 6 °F (37 °C)   11/17/21 97 8 °F (36 6 °C)     Vancomycin Days of Therapy: 3    Assessment/Plan  The patient is currently on vancomycin utilizing scheduled dosing  Baseline risks associated with therapy include: concomitant nephrotoxic medications and dehydration  The patient is receiving 1250 mg iv q 12 hours with the most recent vancomycin level being at steady-state and sub-therapeutic (8) based on a goal of 15-20 (appropriate for most indications) ; therefore, after clinical evaluation will be changed to 1250 mg iv q 8 hours     Pharmacy will continue to follow closely for s/sx of nephrotoxicity, infusion reactions, and appropriateness of therapy  BMP and CBC will be ordered per protocol  Plan for trough as patient approaches steady state, prior to the 4th  dose at approximately 06:00 on 3/23/22  Pharmacy will continue to follow the patients culture results and clinical progress daily      Shannan Barajas, Pharmacist

## 2022-03-22 NOTE — DISCHARGE INSTRUCTIONS
Continue antibiotics until finished  Leave packing in place  It is ok if it comes out  Pat dry  Call the office if there is increased drainage, size of lip, pain, fever chills  WET TO DRY DRESSING TO RIGHT GROIN CHANGED

## 2022-03-22 NOTE — DISCHARGE SUMMARY
[address] [facility]  Discharge- Blair Gotti 1970, 49 y o  female MRN: [id number]  Unit/Bed#: -01 Encounter: [id number]  Primary Care Provider: Antonio Thomas PA-C   Date and time admitted to hospital: 3/20/2022  5:35 AM    * Cellulitis of lip  Assessment & Plan  · CT facial bone-cellulitis of lower lip, asymmetric to the right; phlegmonous changes crossing midline  · Patient resting comfortably on exam with no overnight events or fevers    Plan  · Blood cultures x2 no growth at 1 day  · Fluid culture growing Staph aureus  · Will discharge on doxycycline to complete 7 days antibiotics  · I and D by General surgery 3/20  · Outpatient follow-up with General surgery    Sepsis ([place] [address] )  Assessment & Plan  · As noted by fever, tachycardia and elevated white count  · Plan as above    Major depressive disorder, single episode, mild with anxious distress ([facility] )  Assessment & Plan  · Continue home antidepressant    Generalized anxiety disorder  Assessment & Plan  · Continue home Wellbutrin and BuSpar    Benign essential hypertension  Assessment & Plan  · Continue home blood pressure medications      Medical Problems             Resolved Problems  Date Reviewed: 3/18/2022    None              Discharging Physician / Practitioner: Bren Rowe DO  PCP: Antonio Thomas PA-C  Admission Date:   Admission Orders (From admission, onward)     Ordered        03/21/22 1233  Inpatient Admission  Once            03/20/22 0819  Place in Observation  Once                      Discharge Date: 03/22/22    Consultations During Hospital Stay:  · General surgery    Procedures Performed:   · 3/20 I&D lower lip      Complications:  none    Reason for Admission: lip swelling     Hospital Course:   Blair Gotti is a 49 y o  female patient who originally presented to the hospital on 3/20/2022 due to lip swelling  Patient noted to lip swelling likely secondary to facial cellulitis    Patient was treated with IV antibiotics during hospitalization and will be sent home with antibiotic course to complete on discharge  Patient also seen by General surgery team who did I and D of area as phlegmon was noted  Patient feeling markedly better on examination at this time and is stable for discharge  Patient will follow-up with General surgery as an outpatient  Patient should also follow up with primary care provider  Please see above list of diagnoses and related plan for additional information  Condition at Discharge: stable    Discharge Day Visit / Exam:   Subjective:  Patient resting comfortably examination  Patient had no overnight events or complaints on exam   Vitals: Blood Pressure: 145/97 (03/22/22 0849)  Pulse: 89 (03/22/22 0849)  Temperature: 98 1 °F (36 7 °C) (03/22/22 0849)  Temp Source: Oral (03/20/22 0754)  Respirations: 18 (03/22/22 0849)  Height: 5' 6" (167 6 cm) (03/20/22 1310)  Weight - Scale: 94 4 kg (208 lb 1 8 oz) (03/20/22 1310)  SpO2: 95 % (03/22/22 0849)  Exam:   Physical Exam  Vitals and nursing note reviewed  Constitutional:       General: She is not in acute distress  Appearance: She is well-developed  HENT:      Head: Normocephalic and atraumatic  Mouth/Throat:      Comments: Lip swelling and erythema still present but much improved  Eyes:      General: No scleral icterus  Conjunctiva/sclera: Conjunctivae normal    Cardiovascular:      Rate and Rhythm: Normal rate and regular rhythm  Heart sounds: Normal heart sounds  No murmur heard  No friction rub  No gallop  Pulmonary:      Effort: Pulmonary effort is normal  No respiratory distress  Breath sounds: Normal breath sounds  No wheezing or rales  Abdominal:      General: Bowel sounds are normal  There is no distension  Palpations: Abdomen is soft  Tenderness: There is no abdominal tenderness  Musculoskeletal:         General: Normal range of motion     Skin:     General: Skin is warm       Findings: No rash  Neurological:      Mental Status: She is alert and oriented to person, place, and time  Discussion with Family: Patient declined call to   Discharge instructions/Information to patient and family:   See after visit summary for information provided to patient and family  Provisions for Follow-Up Care:  See after visit summary for information related to follow-up care and any pertinent home health orders  Disposition:   Home    Planned Readmission: no     Discharge Statement:  I spent 35 minutes discharging the patient  This time was spent on the day of discharge  I had direct contact with the patient on the day of discharge  Greater than 50% of the total time was spent examining patient, answering all patient questions, arranging and discussing plan of care with patient as well as directly providing post-discharge instructions  Additional time then spent on discharge activities  Discharge Medications:  See after visit summary for reconciled discharge medications provided to patient and/or family        **Please Note: This note may have been constructed using a voice recognition system**

## 2022-03-22 NOTE — PROGRESS NOTES
Progress Note -Surgery JACKIE Tavarez 49 y o  female MRN: [id number]  Unit/Bed#: -01 Encounter: [id number]      Assessment   47y F with abscess of lower lip at [place]  - s/p I&D 3/20/22  - packing removed and wound flushed, small wick of packing replaced  - edema and tenderness improving  - AVSS, no leukoctyosis  - Cultures show Staph aureus  Plan   -- Ok for discharge from surgical standpoint  -- Continue antibiotics to complete course  -- Warm compresses to lip  -- packing can be removed tomorrow, if it falls out before, this is ok  -- OK to shower  -- follow up in the surgery office  ______________________________________________________________________  Subjective:   Patient feeling well today  Pain at incision site, but otherwise feeling improved  No fevers or chills  Objective:    Vitals:  /97   Pulse 89   Temp 98 1 °F (36 7 °C)   Resp 18   Ht 5' 6" (1 676 m)   Wt 94 4 kg (208 lb 1 8 oz)   SpO2 95%   BMI 33 59 kg/m²     I/Os:  I/O last 3 completed shifts: In: 26 [P O :460]  Out: 300 [Urine:300]    I/O this shift:  In: 240 [P O :240]  Out: -     Invasive Devices  Report    None                 Medications:  No current facility-administered medications for this encounter                   Lab Results and Cultures:   CBC with diff:   Lab Results   Component Value Date    WBC 6 85 03/22/2022    HGB 13 4 03/22/2022    HCT 40 5 03/22/2022    MCV 94 03/22/2022     03/22/2022    MCH 30 9 03/22/2022    MCHC 33 1 03/22/2022    RDW 12 6 03/22/2022    MPV 10 6 03/22/2022    NRBC 0 03/22/2022       BMP/CMP:  Lab Results   Component Value Date     01/17/2017    K 3 7 03/21/2022    K 4 5 01/17/2017     03/21/2022     01/17/2017    CO2 25 03/21/2022    CO2 25 01/17/2017    BUN 9 03/21/2022    BUN 12 01/17/2017    CREATININE 0 78 03/21/2022    CREATININE 0 71 01/17/2017    CALCIUM 8 5 03/21/2022    CALCIUM 9 0 01/17/2017    AST 19 11/10/2021    AST 17 01/17/2017 ALT 42 11/10/2021    ALT 16 01/17/2017    ALKPHOS 44 (L) 11/10/2021    ALKPHOS 30 (L) 01/17/2017    PROT 6 7 01/17/2017    BILITOT 0 6 01/17/2017    EGFR 88 03/21/2022       Lipid Panel:   Lab Results   Component Value Date    CHOL 177 05/27/2016       Coags:   Lab Results   Component Value Date    INR 0 95 04/22/2019        Urinalysis:   Lab Results   Component Value Date    COLORU Yellow 04/26/2019    CLARITYU Cloudy 04/26/2019    SPECGRAV 1 020 04/26/2019    PHUR 7 0 04/26/2019    LEUKOCYTESUR Negative 04/26/2019    NITRITE Negative 04/26/2019    GLUCOSEU Negative 04/26/2019    KETONESU Trace (A) 04/26/2019    BILIRUBINUR Negative 04/26/2019    BLOODU Large (A) 04/26/2019        Urine Culture: No results found for: URINECX   Wound Culure:   Lab Results   Component Value Date    WOUNDCULT 1+ Growth of Staphylococcus aureus (A) 03/20/2022     Blood Culture:   Lab Results   Component Value Date    BLOODCX No Growth at 24 hrs  03/20/2022    BLOODCX No Growth at 24 hrs  03/20/2022         Physical Exam:  General Appearance:    Alert and orientated x 3, cooperative, no distress, appears stated age   Lungs:     Clear to auscultation bilaterally, respirations unlabored, no wheezes    Heart:    Regular rate and rhythm, S1 and S2 normal, no murmur   Abdomen:    Normoactive BS, soft, non tender, non rigid, no masses, no palpated organomegaly   Extremities:  Extremities normal, no calf tenderness, no cyanosis or edema   Pulses:   2+ and symmetric all extremities;  mildline lower lip with incision at vermilion boarder with surrounding erythema and edema, no fluctuance, minimal tenderness; serous fluid from site   Skin:   Skin color, texture, turgor normal, no rashes   Neurologic:   CNII-XII intact, normal strength, affect appropriate       Imaging:  CT facial bones with contrast    Result Date: 3/20/2022  Impression: 1  Cellulitis of the lower lip, asymmetric to the right  Phlegmonous changes crossing the midline    Early abscess formation not excluded  If warranted, consider follow-up plastic surgery or ENT consultation  2   7 mm enhancing extra-axial soft tissue mass in the left frontal lobe, most compatible with a meningioma  The study was marked in [facility] for immediate notification   Workstation performed: [id number]       Jaquelin Márquez PA-C   3/22/2022

## 2022-03-22 NOTE — UTILIZATION REVIEW
Continued Stay Review    Please see initial review completed on 3/21/22 by MELONY Jarvis RN  Current Patient Class: inpatient  Current Level of Care: med surg    HPI:51 y o  female initially admitted on 3/21/22     Date:  3/22/22  Day 2  Pt feeling much better today, lip swelling and erythema remains but much improved and ok for discharge  Blood cx negative at Day 1  F/u on cx growing staph aureus  ID by General Sx on 3/20, outpt f/u w/ General Sx  Continue home meds  Discharge to home/self care      Vital Signs:  Date/Time Temp Pulse Resp BP MAP (mmHg) SpO2 O2 Device Patient Position - Orthostatic VS   03/22/22 08:49:56 98 1 °F (36 7 °C) 89 18 145/97 113 95 % -- --   03/21/22 23:40:02 98 4 °F (36 9 °C) 87 -- 151/85 107 94 % -- --   03/21/22 1526 99 2 °F (37 3 °C) 86 19 156/85 109 93 % -- --   03/21/22 1000 -- -- -- -- -- 97 % None (Room air) --   03/21/22 08:48:30 98 5 °F (36 9 °C) 96 -- 144/87 106 97 % -- --   03/21/22 08:47:38 98 5 °F (36 9 °C) 95 -- 144/87 106 97 % -- --   03/20/22 23:37:58 98 3 °F (36 8 °C) 86 20 115/66 82 96 % -- --   03/20/22 15:30:39 99 4 °F (37 4 °C) 114 Abnormal  -- 136/81 99 97 % -- --   03/20/22 1359 -- -- -- -- -- -- None (Room air) --   03/20/22 13:10:01 100 1 °F (37 8 °C) 109 Abnormal  17 144/85 105 97 % -- --   03/20/22 1236 -- 110 Abnormal  18 134/72 97 98 % None (Room air) Sitting   03/20/22 1030 -- 93 18 111/67 84 97 % None (Room air) Sitting   03/20/22 0900 -- 101 18 125/66 86 97 % None (Room air) Sitting   03/20/22 0807 -- -- -- -- -- -- None (Room air) --   03/20/22 0754 98 3 °F (36 8 °C) 103 18 120/63 83 97 % None (Room air) Sitting   03/20/22 0551 -- -- -- -- -- -- None (Room air) --   03/20/22 0540 101 5 °F (38 6 °C) Abnormal  112 Abnormal  19 164/87 -- 96 % None (Room air) Lying     Pertinent Labs/Diagnostic Results:       Results from last 7 days   Lab Units 03/22/22  0501 03/21/22  0532 03/20/22  0617   WBC Thousand/uL 6 85 7 75 11 00*   HEMOGLOBIN g/dL 13 4 12 3 14 4   HEMATOCRIT % 40 5 34 8 40 2   PLATELETS Thousands/uL 205 162 187   NEUTROS ABS Thousands/µL 3 64 5 03 8 65*     Results from last 7 days   Lab Units 03/21/22  0532 03/20/22  0617   SODIUM mmol/L 141 137   POTASSIUM mmol/L 3 7 3 5   CHLORIDE mmol/L 106 102   CO2 mmol/L 25 25   ANION GAP mmol/L 10 10   BUN mg/dL 9 9   CREATININE mg/dL 0 78 0 93   EGFR ml/min/1 73sq m 88 71   CALCIUM mg/dL 8 5 9 0     Results from last 7 days   Lab Units 03/21/22  0532 03/20/22  0617   GLUCOSE RANDOM mg/dL 106 105     Results from last 7 days   Lab Units 03/20/22  0617   LACTIC ACID mmol/L 0 8     Results from last 7 days   Lab Units 03/20/22  1151 03/20/22  0617   BLOOD CULTURE   --  No Growth at 24 hrs  No Growth at 24 hrs  GRAM STAIN RESULT  No Polys*  Rare Gram positive cocci in pairs*  --    WOUND CULTURE  1+ Growth of Staphylococcus aureus*  --      Medications:   Scheduled Medications:  ampicillin-sulbactam, 3 g, Intravenous, Q6H  buPROPion, 150 mg, Oral, QAM  busPIRone, 5 mg, Oral, BID  enoxaparin, 40 mg, Subcutaneous, Daily  hydrochlorothiazide, 25 mg, Oral, QAM  levothyroxine, 150 mcg, Oral, Early Morning  vancomycin, 17 5 mg/kg (Adjusted), Intravenous, Q8H      Continuous IV Infusions: none     PRN Meds:  acetaminophen, 650 mg, Oral, Q6H PRN x2 thus far  oxyCODONE, 5 mg, Oral, Q4H PRN  oxyCODONE, 7 5 mg, Oral, Q4H PRN x1 thus far  zolpidem, 5 mg, Oral, HS PRN        Discharge Plan: discharge today to home/self care     Network Utilization Review Department  ATTENTION: Please call with any questions or concerns to [phone] and carefully listen to the prompts so that you are directed to the right person  All voicemails are confidential   Leonor Dunlap all requests for admission clinical reviews, approved or denied determinations and any other requests to dedicated fax number below belonging to the campus where the patient is receiving treatment   List of dedicated fax numbers for the Facilities:  Harriett Carmona NUMBER   ADMISSION DENIALS (Administrative/Medical Necessity) [phone]   [address] (Maternity/NICU/Pediatrics) 261 [facility],7Th Floor [facility] [address] [place] [address]  [phone]   [address] [address] [place] [address] [place] La[address] [facility] [address] [facility] [phone]

## 2022-03-23 ENCOUNTER — TRANSITIONAL CARE MANAGEMENT (OUTPATIENT)
Dept: FAMILY MEDICINE CLINIC | Facility: CLINIC | Age: 52
End: 2022-03-23

## 2022-03-23 NOTE — UTILIZATION REVIEW
Inpatient Admission Authorization Request   NOTIFICATION OF INPATIENT ADMISSION/INPATIENT AUTHORIZATION REQUEST   SERVICING FACILITY:   [address] [place] [address]  Tax ID: [id number]  NPI: [id number]  Place of Service: Inpatient [facility]  [address]  Place of Service Code: 24     ATTENDING PROVIDER:  Attending Name and NPI#: Osmansimone Shilo [[id number]]  Address: [address] [place] [address]  Phone: [phone]     UTILIZATION REVIEW CONTACT:  Zebedee Hashimoto, Utilization   Network Utilization Review Department  Phone: [phone]  Fax [phone]  Email: [email or web address]     PHYSICIAN ADVISORY SERVICES:  FOR JWIL-PW-NDGU REVIEW - MEDICAL NECESSITY DENIAL  Phone: [phone]  Fax: [phone]  Email: [email or web address]     TYPE OF REQUEST:  Inpatient Status     ADMISSION INFORMATION:  ADMISSION DATE/TIME: 3/21/22 12:33 PM  PATIENT DIAGNOSIS CODE/DESCRIPTION:  Leukocytosis [D72 829]  Cellulitis, lip [K13 0]  Lip swelling [R22 0]  Fever [R50 9]  Sepsis ([address] ) [A41 9]  Failure of outpatient treatment [Z78 9]  DISCHARGE DATE/TIME: 3/22/2022 12:20 PM   IMPORTANT INFORMATION:  Please contact the Zebedee Hashimoto directly with any questions or concerns regarding this request  Department voicemails are confidential     Send requests for admission clinical reviews, concurrent reviews, approvals, and administrative denials due to lack of clinical to fax [phone]

## 2022-03-24 LAB
BACTERIA WND AEROBE CULT: ABNORMAL
GRAM STN SPEC: ABNORMAL
GRAM STN SPEC: ABNORMAL

## 2022-03-24 NOTE — UTILIZATION REVIEW
Notification of Discharge   This is a Notification of Discharge from our facility [address]  Please be advised that this patient has been discharge from our facility  Below you will find the admission and discharge date and time including the patients disposition  UTILIZATION REVIEW CONTACT:  Baltazar Quiles  Utilization   Network Utilization Review Department  Phone: [phone] x carefully listen to the prompts  All voicemails are confidential   Email: [email or web address]     PHYSICIAN ADVISORY SERVICES:  FOR NNVV-PF-KTRM REVIEW - MEDICAL NECESSITY DENIAL  Phone: [phone]  Fax: [phone]  Email: [email or web address]     PRESENTATION DATE: 3/20/2022  5:35 AM  OBERVATION ADMISSION DATE: 03/20/22  INPATIENT ADMISSION DATE: 3/21/22 12:33 PM   DISCHARGE DATE: 3/22/2022 12:20 PM  DISPOSITION: Home/Self Care Home/Self Care      IMPORTANT INFORMATION:  Send all requests for admission clinical reviews, approved or denied determinations and any other requests to dedicated fax number below belonging to the campus where the patient is receiving treatment   List of dedicated fax numbers:  [address] DENIALS (Administrative/Medical Necessity) [phone]   [address] (Maternity/NICU/Pediatrics) [phone]   [facility] [phone]   130 [facility] [phone]   [address] [place] [address] [phone]   [address] [place] [address],4Th Floor [address] [phone]   [facility]  [phone]   [address] [place], [place] [address] [facility] 1000 [facility] [phone]

## 2022-03-25 LAB
BACTERIA BLD CULT: NORMAL
BACTERIA BLD CULT: NORMAL

## 2022-03-28 ENCOUNTER — OFFICE VISIT (OUTPATIENT)
Dept: FAMILY MEDICINE CLINIC | Facility: CLINIC | Age: 52
End: 2022-03-28
Payer: COMMERCIAL

## 2022-03-28 VITALS
HEART RATE: 105 BPM | TEMPERATURE: 98.4 F | DIASTOLIC BLOOD PRESSURE: 78 MMHG | HEIGHT: 66 IN | SYSTOLIC BLOOD PRESSURE: 130 MMHG | WEIGHT: 208 LBS | BODY MASS INDEX: 33.43 KG/M2 | OXYGEN SATURATION: 99 %

## 2022-03-28 DIAGNOSIS — K13.0 CELLULITIS OF LIP: ICD-10-CM

## 2022-03-28 DIAGNOSIS — Z76.89 ENCOUNTER FOR SUPPORT AND COORDINATION OF TRANSITION OF CARE: Primary | ICD-10-CM

## 2022-03-28 PROCEDURE — 99495 TRANSJ CARE MGMT MOD F2F 14D: CPT | Performed by: PHYSICIAN ASSISTANT

## 2022-03-28 PROCEDURE — 1111F DSCHRG MED/CURRENT MED MERGE: CPT | Performed by: PHYSICIAN ASSISTANT

## 2022-03-28 RX ORDER — DOXYCYCLINE HYCLATE 100 MG/1
100 CAPSULE ORAL EVERY 12 HOURS SCHEDULED
Qty: 20 CAPSULE | Refills: 0 | Status: SHIPPED | OUTPATIENT
Start: 2022-03-28 | End: 2022-04-07

## 2022-03-28 NOTE — PROGRESS NOTES
Assessment/Plan:     No problem-specific Assessment & Plan notes found for this encounter  Diagnoses and all orders for this visit:    Encounter for support and coordination of transition of care    Cellulitis of lip  -     doxycycline hyclate (VIBRAMYCIN) 100 mg capsule; Take 1 capsule (100 mg total) by mouth every 12 (twelve) hours for 10 days      Patient has follow-up with surgeon later this week  Patient is given prescription of doxycycline to have with her so she can start antibiotic before the cellulitis gets out of hand  Subjective:     Patient ID: Cresencio Ramos is a 49 y o  female  Patient is here for follow-up from hospitalization  She had cellulitis of her lower lip for which she had started Keflex prescribed by Dr Blade Rodriguez  She then began running a fever and the pain became worse so she went to the emergency room  Patient had the area drained by General surgery and was kept in the hospital for 2 days for IV antibiotics  She states that she is feeling fine and never really felt sick  She states that her lip has decreased swelling at this time  She has not had any drainage from it recently  Of note this 3rd time that patient has had this problem  Review of Systems   Constitutional: Negative for chills, fatigue and fever  Skin: Positive for wound  Neurological: Negative for dizziness and light-headedness  Objective:     Physical Exam  Vitals and nursing note reviewed  Constitutional:       Appearance: Normal appearance  HENT:      Head: Normocephalic and atraumatic  Skin:     Findings: Wound present  Comments: Healing wound at [facility] border of mid lower lip  Mild edema of lower lip  No erythema or drainage   Neurological:      Mental Status: She is alert             Vitals:    03/28/22 1300   BP: 130/78   BP Location: Left arm   Patient Position: Sitting   Cuff Size: Adult   Pulse: 105   Temp: 98 4 °F (36 9 °C)   TempSrc: Tympanic   SpO2: 99% Weight: 94 3 kg (208 lb)   Height: 5' 6" (1 676 m)       Transitional Care Management Review:  Romario Salas is a 49 y o  female here for TCM follow up  During the TCM phone call patient stated:    TCM Call (since 2/25/2022)     Date and time call was made  3/23/2022 11:16 AM    Hospital care reviewed  Records reviewed        Patient was hospitialized at  [facility]        Date of Admission  03/20/22    Date of discharge  03/22/22    Diagnosis  Cellulitis of lip    Disposition  Home    Were the patients medications reviewed and updated  Yes    Current Symptoms  Swelling      TCM Call (since 2/25/2022)     Clinical progress swelling  Improving    Post hospital issues  None    Should patient be enrolled in anticoag monitoring? No    Scheduled for follow up? Yes    Did you obtain your prescribed medications  Yes    Do you need help managing your prescriptions or medications  No    Is transportation to your appointment needed  No    I have advised the patient to call PCP with any new or worsening symptoms  Myriam Kim/maximiliano  Living Arrangements  Family members    Support System  Family    The type of support provided  Emotional    Do you have social support  Yes, as much as I need    Are you recieving any outpatient services  No    Are you recieving home care services  No    Are you using any community resources  No    Current waiver services  No    Have you fallen in the last 12 months  No    Interperter language line needed  No    Counseling  Patient    Counseling topics  patient and family education; Importance of RX compliance; Activities of daily living    Comments  spoke with pt on 03/23/22 after her hospital d/c on 03/22/22 for a lip laceration  pt states that lip swelling is decreasing, she denied fever, chills, nausea, vomiting  she is taking her abx as prescribed and she will follow up with heneral surgery in a week   pt will see PCP on 03/28/22 for hospital d/c and she is aware to call our office if she has any questions or concern  ER/CMA            Valeria Cooney PA-C

## 2022-03-30 ENCOUNTER — OFFICE VISIT (OUTPATIENT)
Dept: SURGERY | Facility: CLINIC | Age: 52
End: 2022-03-30

## 2022-03-30 VITALS
RESPIRATION RATE: 16 BRPM | BODY MASS INDEX: 33.27 KG/M2 | SYSTOLIC BLOOD PRESSURE: 122 MMHG | HEART RATE: 100 BPM | TEMPERATURE: 98 F | WEIGHT: 207 LBS | OXYGEN SATURATION: 98 % | DIASTOLIC BLOOD PRESSURE: 98 MMHG | HEIGHT: 66 IN

## 2022-03-30 DIAGNOSIS — K13.0 LIP ABSCESS: Primary | ICD-10-CM

## 2022-03-30 DIAGNOSIS — Z09 POSTOP CHECK: ICD-10-CM

## 2022-03-30 PROCEDURE — 99024 POSTOP FOLLOW-UP VISIT: CPT | Performed by: SURGERY

## 2022-03-30 PROCEDURE — 3008F BODY MASS INDEX DOCD: CPT | Performed by: FAMILY MEDICINE

## 2022-04-10 NOTE — PROGRESS NOTES
Assessment/Plan:     1  Lip abscess    2  Postop check        She is doing well - vast improvement, there is very little ongoing edema  She may f/u prn  Subjective:      Patient ID: Allyssa Saleh is a 49 y o  female  Triage Notes:    Ms Gloria Thorne is following up after hospitalization and I&D of abscess of the bottom lip  She reports marked imp;rovement and that she feels she can eat and drink now without difficulty  She is happy with the healing given the placement of the incision  No fevers/chills/drainage  The following portions of the patient's history were reviewed and updated as appropriate: allergies, current medications, past family history, past medical history, past social history, past surgical history and problem list     Review of Systems      Objective:      /98   Pulse 100   Temp 98 °F (36 7 °C) (Temporal)   Resp 16   Ht 5' 6" (1 676 m)   Wt 93 9 kg (207 lb)   SpO2 98%   BMI 33 41 kg/m²     Below is the patient's most recent value for Albumin, ALT, AST, BUN, Calcium, Chloride, Cholesterol, CO2, Creatinine, GFR, Glucose, HDL, Hematocrit, Hemoglobin, Hemoglobin A1C, LDL, Magnesium, Phosphorus, Platelets, Potassium, PSA, Sodium, Triglycerides, and WBC  Lab Results   Component Value Date    ALT 42 11/10/2021    AST 19 11/10/2021    BUN 9 03/21/2022    CALCIUM 8 5 03/21/2022     03/21/2022    CHOL 177 05/27/2016    CO2 25 03/21/2022    CREATININE 0 78 03/21/2022    HDL 48 11/10/2021    HCT 40 5 03/22/2022    HGB 13 4 03/22/2022     03/22/2022    K 3 7 03/21/2022     01/17/2017    TRIG 113 11/10/2021    WBC 6 85 03/22/2022     Note: for a comprehensive list of the patient's lab results, access the Results Review activity  Physical Exam  Vitals and nursing note reviewed  Constitutional:       General: She is not in acute distress  Appearance: She is well-developed  She is not ill-appearing, toxic-appearing or diaphoretic     HENT:      Head: Normocephalic and atraumatic  Eyes:      Pupils: Pupils are equal, round, and reactive to light  Cardiovascular:      Rate and Rhythm: Normal rate and regular rhythm  Pulmonary:      Effort: Pulmonary effort is normal  No respiratory distress  Musculoskeletal:         General: Normal range of motion  Cervical back: Normal range of motion and neck supple  Skin:     General: Skin is warm and dry  Comments: Incision is healed  No ongoing fluctuance or erythema  Edema is 95% resolved  Neurological:      Mental Status: She is alert and oriented to person, place, and time  Psychiatric:         Mood and Affect: Mood normal          Behavior: Behavior normal          Thought Content:  Thought content normal          Judgment: Judgment normal              Procedures

## 2022-04-28 ENCOUNTER — CLINICAL SUPPORT (OUTPATIENT)
Dept: FAMILY MEDICINE CLINIC | Facility: CLINIC | Age: 52
End: 2022-04-28
Payer: COMMERCIAL

## 2022-04-28 DIAGNOSIS — Z30.42 ENCOUNTER FOR DEPO-PROVERA CONTRACEPTION: Primary | ICD-10-CM

## 2022-04-28 PROCEDURE — 96372 THER/PROPH/DIAG INJ SC/IM: CPT

## 2022-04-28 RX ORDER — MEDROXYPROGESTERONE ACETATE 150 MG/ML
150 INJECTION, SUSPENSION INTRAMUSCULAR ONCE
Status: COMPLETED | OUTPATIENT
Start: 2022-04-28 | End: 2022-04-28

## 2022-04-28 RX ADMIN — MEDROXYPROGESTERONE ACETATE 150 MG: 150 INJECTION, SUSPENSION INTRAMUSCULAR at 14:04

## 2022-04-28 NOTE — PROGRESS NOTES
Seen for a nurse visit for a Depo provera injection, administered in left deltoid per pt request, pt tolerated it well  Next administration should be between 07/14/22-07/28/22 and pt is aware       Myriam Kim/maximiliano  04/28/22  2:03 PM

## 2022-06-04 DIAGNOSIS — E03.9 HYPOTHYROIDISM, ADULT: ICD-10-CM

## 2022-06-04 RX ORDER — LEVOTHYROXINE SODIUM 0.15 MG/1
TABLET ORAL
Qty: 90 TABLET | Refills: 1 | Status: SHIPPED | OUTPATIENT
Start: 2022-06-04

## 2022-06-10 ENCOUNTER — TELEPHONE (OUTPATIENT)
Dept: FAMILY MEDICINE CLINIC | Facility: CLINIC | Age: 52
End: 2022-06-10

## 2022-06-10 NOTE — TELEPHONE ENCOUNTER
----- Message from Alice Alanis PA-C sent at 6/10/2022 10:37 AM EDT -----  Regarding: FW: Mask Letter  Please write a letter stating pt has history of facial cellulitis/abscess with wearing masks  She had one episode that required hospitalization      ----- Message -----  From: Audrey Diaz  Sent: 6/10/2022  10:31 AM EDT  To: Alice Alanis PA-C  Subject: FW: Mask Letter                                    ----- Message -----  From: Teresa Werner  Sent: 6/10/2022   9:36 AM EDT  To: , #  Subject: Mask Letter                                      Hi Rebecca    I was hoping you would write me an exemption letter for masks, just in case I need it for work  I have had 3 face infections and 1 hospital stay and I don't want t to ever happen again  Thanks!   Nina

## 2022-07-09 DIAGNOSIS — Z00.00 HEALTH CARE MAINTENANCE: ICD-10-CM

## 2022-07-11 RX ORDER — MEDROXYPROGESTERONE ACETATE 150 MG/ML
INJECTION, SUSPENSION INTRAMUSCULAR
Qty: 1 ML | Refills: 1 | Status: SHIPPED | OUTPATIENT
Start: 2022-07-11

## 2022-07-19 ENCOUNTER — CLINICAL SUPPORT (OUTPATIENT)
Dept: FAMILY MEDICINE CLINIC | Facility: CLINIC | Age: 52
End: 2022-07-19
Payer: COMMERCIAL

## 2022-07-19 DIAGNOSIS — Z30.42 ENCOUNTER FOR DEPO-PROVERA CONTRACEPTION: Primary | ICD-10-CM

## 2022-07-19 PROCEDURE — 96372 THER/PROPH/DIAG INJ SC/IM: CPT

## 2022-07-19 RX ORDER — MEDROXYPROGESTERONE ACETATE 150 MG/ML
150 INJECTION, SUSPENSION INTRAMUSCULAR ONCE
Status: COMPLETED | OUTPATIENT
Start: 2022-07-19 | End: 2022-07-19

## 2022-07-19 RX ADMIN — MEDROXYPROGESTERONE ACETATE 150 MG: 150 INJECTION, SUSPENSION INTRAMUSCULAR at 13:11

## 2022-07-19 NOTE — PROGRESS NOTES
Seen for a nurse visit for a depo provera injection, administered in left deltoid per pt request  Pt tolerated it well  Next dose will be due between 10/4/22 and 10/18/22 and pt is aware      Myriam Kim/maximiliano  07/19/22  1:10 PM

## 2022-08-30 DIAGNOSIS — F32.0 MAJOR DEPRESSIVE DISORDER, SINGLE EPISODE, MILD WITH ANXIOUS DISTRESS (HCC): ICD-10-CM

## 2022-08-30 DIAGNOSIS — F41.1 GENERALIZED ANXIETY DISORDER: ICD-10-CM

## 2022-08-30 DIAGNOSIS — I10 HYPERTENSION, ESSENTIAL: ICD-10-CM

## 2022-08-30 RX ORDER — BUPROPION HYDROCHLORIDE 150 MG/1
TABLET ORAL
Qty: 90 TABLET | Refills: 1 | Status: SHIPPED | OUTPATIENT
Start: 2022-08-30

## 2022-08-30 RX ORDER — BUSPIRONE HYDROCHLORIDE 5 MG/1
TABLET ORAL
Qty: 180 TABLET | Refills: 1 | Status: SHIPPED | OUTPATIENT
Start: 2022-08-30

## 2022-08-30 RX ORDER — HYDROCHLOROTHIAZIDE 25 MG/1
TABLET ORAL
Qty: 90 TABLET | Refills: 1 | Status: SHIPPED | OUTPATIENT
Start: 2022-08-30

## 2022-10-11 ENCOUNTER — CLINICAL SUPPORT (OUTPATIENT)
Dept: FAMILY MEDICINE CLINIC | Facility: CLINIC | Age: 52
End: 2022-10-11
Payer: COMMERCIAL

## 2022-10-11 DIAGNOSIS — Z30.42 ENCOUNTER FOR DEPO-PROVERA CONTRACEPTION: Primary | ICD-10-CM

## 2022-10-11 PROBLEM — A41.9 SEPSIS (HCC): Status: RESOLVED | Noted: 2022-03-20 | Resolved: 2022-10-11

## 2022-10-11 PROCEDURE — 96372 THER/PROPH/DIAG INJ SC/IM: CPT

## 2022-10-11 RX ORDER — MEDROXYPROGESTERONE ACETATE 150 MG/ML
150 INJECTION, SUSPENSION INTRAMUSCULAR ONCE
Status: COMPLETED | OUTPATIENT
Start: 2022-10-11 | End: 2022-10-11

## 2022-10-11 RX ADMIN — MEDROXYPROGESTERONE ACETATE 150 MG: 150 INJECTION, SUSPENSION INTRAMUSCULAR at 08:44

## 2022-10-11 NOTE — PROGRESS NOTES
Pt seen today for a nurse visit for a depo provera injection, administered in left deltoid per pt request  Pt tolerated it well  Next dose will be due between 12/27/22-01/10/2023 and pt is aware      Myriam Kim/maximiliano  10/11/22  8:43 AM

## 2022-11-07 ENCOUNTER — OFFICE VISIT (OUTPATIENT)
Dept: FAMILY MEDICINE CLINIC | Facility: CLINIC | Age: 52
End: 2022-11-07

## 2022-11-07 VITALS
HEART RATE: 91 BPM | WEIGHT: 205 LBS | BODY MASS INDEX: 32.95 KG/M2 | SYSTOLIC BLOOD PRESSURE: 128 MMHG | OXYGEN SATURATION: 98 % | DIASTOLIC BLOOD PRESSURE: 76 MMHG | HEIGHT: 66 IN | TEMPERATURE: 97.3 F

## 2022-11-07 DIAGNOSIS — I10 HYPERTENSION, ESSENTIAL: ICD-10-CM

## 2022-11-07 DIAGNOSIS — E78.1 HYPERTRIGLYCERIDEMIA WITHOUT HYPERCHOLESTEROLEMIA: ICD-10-CM

## 2022-11-07 DIAGNOSIS — F32.0 MAJOR DEPRESSIVE DISORDER, SINGLE EPISODE, MILD WITH ANXIOUS DISTRESS (HCC): ICD-10-CM

## 2022-11-07 DIAGNOSIS — F41.1 GENERALIZED ANXIETY DISORDER: ICD-10-CM

## 2022-11-07 DIAGNOSIS — G47.00 INSOMNIA, UNSPECIFIED TYPE: ICD-10-CM

## 2022-11-07 DIAGNOSIS — Z00.00 ANNUAL PHYSICAL EXAM: Primary | ICD-10-CM

## 2022-11-07 DIAGNOSIS — Z00.00 HEALTH CARE MAINTENANCE: ICD-10-CM

## 2022-11-07 DIAGNOSIS — E03.9 HYPOTHYROIDISM, ADULT: ICD-10-CM

## 2022-11-07 RX ORDER — BUSPIRONE HYDROCHLORIDE 5 MG/1
5 TABLET ORAL 2 TIMES DAILY
Qty: 180 TABLET | Refills: 1 | Status: SHIPPED | OUTPATIENT
Start: 2022-11-07

## 2022-11-07 RX ORDER — LEVOTHYROXINE SODIUM 0.15 MG/1
150 TABLET ORAL DAILY
Qty: 90 TABLET | Refills: 1 | Status: SHIPPED | OUTPATIENT
Start: 2022-11-07

## 2022-11-07 RX ORDER — BUPROPION HYDROCHLORIDE 150 MG/1
150 TABLET ORAL EVERY MORNING
Qty: 90 TABLET | Refills: 1 | Status: SHIPPED | OUTPATIENT
Start: 2022-11-07

## 2022-11-07 RX ORDER — HYDROCHLOROTHIAZIDE 25 MG/1
25 TABLET ORAL EVERY MORNING
Qty: 90 TABLET | Refills: 1 | Status: SHIPPED | OUTPATIENT
Start: 2022-11-07

## 2022-11-07 RX ORDER — ZOLPIDEM TARTRATE 6.25 MG/1
6.25 TABLET, FILM COATED, EXTENDED RELEASE ORAL
Qty: 30 TABLET | Refills: 3 | Status: SHIPPED | OUTPATIENT
Start: 2022-11-07

## 2022-11-07 RX ORDER — MEDROXYPROGESTERONE ACETATE 150 MG/ML
150 INJECTION, SUSPENSION INTRAMUSCULAR
Qty: 1 ML | Refills: 1 | Status: SHIPPED | OUTPATIENT
Start: 2022-11-07

## 2022-11-07 NOTE — PROGRESS NOTES
[address]     NAME: Silver Ferrara  AGE: 47 y o  SEX: female  : 1970     DATE: 2022     Assessment and Plan:     Problem List Items Addressed This Visit        Other    Generalized anxiety disorder    Major depressive disorder, single episode, mild with anxious distress ([address] )    Insomnia      Other Visit Diagnoses     Hypertension, essential        Hypothyroidism, adult        Health care maintenance              Immunizations and preventive care screenings were discussed with patient today  Appropriate education was printed on patient's after visit summary  Counseling:  Dental Health: discussed importance of regular tooth brushing, flossing, and dental visits  Injury prevention: discussed safety/seat belts, safety helmets, smoke detectors, carbon dioxide detectors, and smoking near bedding or upholstery  · Exercise: the importance of regular exercise/physical activity was discussed  Recommend exercise 3-5 times per week for at least 30 minutes  BMI Counseling: Body mass index is 33 09 kg/m²  The BMI is above normal  Nutrition recommendations include encouraging healthy choices of fruits and vegetables, consuming healthier snacks, limiting drinks that contain sugar, moderation in carbohydrate intake, increasing intake of lean protein, reducing intake of saturated and trans fat and reducing intake of cholesterol  Exercise recommendations include moderate physical activity 150 minutes/week  No pharmacotherapy was ordered  Rationale for BMI follow-up plan is due to patient being overweight or obese  No follow-ups on file  Chief Complaint:     Chief Complaint   Patient presents with   • Physical Exam   • Gynecologic Exam      History of Present Illness:     Adult Annual Physical   Patient here for a comprehensive physical exam  The patient reports no problems      Diet and Physical Activity  · Diet/Nutrition: well balanced diet  · Exercise: walking  Depression Screening  PHQ-2/9 Depression Screening    Little interest or pleasure in doing things: 0 - not at all  Feeling down, depressed, or hopeless: 0 - not at all  Trouble falling or staying asleep, or sleeping too much: 0 - not at all  Feeling tired or having little energy: 0 - not at all  Poor appetite or overeatin - not at all  Feeling bad about yourself - or that you are a failure or have let yourself or your family down: 0 - not at all  Trouble concentrating on things, such as reading the newspaper or watching television: 0 - not at all  Moving or speaking so slowly that other people could have noticed  Or the opposite - being so fidgety or restless that you have been moving around a lot more than usual: 0 - not at all  Thoughts that you would be better off dead, or of hurting yourself in some way: 0 - not at all  PHQ-9 Score: 0   PHQ-9 Interpretation: No or Minimal depression        General Health  · Sleep: sleeps well  · Hearing: normal - bilateral   · Vision: goes for regular eye exams  · Dental: regular dental visits  /GYN Health  · Patient is: perimenopausal  · Last menstrual period: no periods on Depo Provera  · Contraceptive method: injectable contraception  Review of Systems:     Review of Systems   Constitutional: Negative for appetite change, fatigue, fever and unexpected weight change  HENT: Negative for dental problem, ear pain, hearing loss, mouth sores, nosebleeds, rhinorrhea, tinnitus, trouble swallowing and voice change  Eyes: Negative for photophobia, pain, discharge and visual disturbance  Respiratory: Negative for cough, chest tightness, shortness of breath and wheezing  Cardiovascular: Negative for chest pain and palpitations  Gastrointestinal: Negative for abdominal pain, blood in stool, constipation, diarrhea, nausea, rectal pain and vomiting     Endocrine: Negative for cold intolerance, polydipsia, polyphagia and polyuria  Genitourinary: Negative for decreased urine volume, difficulty urinating, dysuria, enuresis, frequency, genital sores, hematuria and urgency  Musculoskeletal: Negative for arthralgias, back pain, gait problem, joint swelling, myalgias, neck pain and neck stiffness  Skin: Negative for color change and rash  Allergic/Immunologic: Negative for environmental allergies, food allergies and immunocompromised state  Neurological: Negative for dizziness, seizures, speech difficulty, light-headedness and headaches  Hematological: Negative for adenopathy  Does not bruise/bleed easily  Psychiatric/Behavioral: Negative for behavioral problems, confusion, decreased concentration, self-injury and sleep disturbance  The patient is not nervous/anxious and is not hyperactive  Past Medical History:     Past Medical History:   Diagnosis Date   • Disease of thyroid gland    • Hypertension       Past Surgical History:     Past Surgical History:   Procedure Laterality Date   •  SECTION, LOW TRANSVERSE      Last Assessed: 5/15/2015   • CHOLECYSTECTOMY      Last Assessed: 5/15/2015    • IR UTERINE ARTERY EMBOLIZATION  2019   • TOOTH EXTRACTION      Last Assessed: 5/15/2015      Social History:     Social History     Socioeconomic History   • Marital status: /Civil Union     Spouse name: None   • Number of children: None   • Years of education: None   • Highest education level: None   Occupational History   • None   Tobacco Use   • Smoking status: Never Smoker   • Smokeless tobacco: Never Used   Vaping Use   • Vaping Use: Never used   Substance and Sexual Activity   • Alcohol use: Not Currently   • Drug use: Never   • Sexual activity: Yes     Partners: Male     Birth control/protection: Injection     Comment: Depo injection     Other Topics Concern   • None   Social History Narrative    Always uses seat belt     Exercises regularly     Seeing a dentist      Social Determinants of Health     Financial Resource Strain: Not on file   Food Insecurity: No Food Insecurity   • Worried About [address] in the Last Year: Never true   • Ran Out of Food in the Last Year: Never true   Transportation Needs: No Transportation Needs   • Lack of Transportation (Medical): No   • Lack of Transportation (Non-Medical): No   Physical Activity: Not on file   Stress: Not on file   Social Connections: Not on file   Intimate Partner Violence: Not on file   Housing Stability: Low Risk    • Unable to Pay for Housing in the Last Year: No   • Number of Places Lived in the Last Year: 1   • Unstable Housing in the Last Year: No      Family History:     Family History   Problem Relation Age of Onset   • No Known Problems Mother    • No Known Problems Father       Current Medications:     Current Outpatient Medications   Medication Sig Dispense Refill   • buPROPion (WELLBUTRIN XL) 150 mg 24 hr tablet TAKE 1 TABLET BY MOUTH EVERY DAY IN THE MORNING 90 tablet 1   • busPIRone (BUSPAR) 5 mg tablet TAKE 1 TABLET BY MOUTH TWICE A  tablet 1   • hydrochlorothiazide (HYDRODIURIL) 25 mg tablet TAKE 1 TABLET BY MOUTH EVERY DAY IN THE MORNING 90 tablet 1   • levothyroxine 150 mcg tablet TAKE 1 TABLET BY MOUTH EVERY DAY 90 tablet 1   • medroxyPROGESTERone (DEPO-PROVERA) 150 mg/mL injection INJECT 1 ML IN MUSCLE EVERY 3 MONTHS AS DIRECTED 1 mL 1   • zolpidem (AMBIEN CR) 6 25 MG CR tablet TAKE 1 TABLET (6 25 MG TOTAL) BY MOUTH DAILY AT BEDTIME AS NEEDED FOR SLEEP 30 tablet 3     No current facility-administered medications for this visit  Allergies:      Allergies   Allergen Reactions   • Pollen Extract    • Shrimp Extract Allergy Skin Test - Food Allergy       Physical Exam:     /76 (BP Location: Left arm, Patient Position: Sitting, Cuff Size: Standard)   Pulse 91   Temp (!) 97 3 °F (36 3 °C)   Ht 5' 6" (1 676 m)   Wt 93 kg (205 lb)   SpO2 98%   BMI 33 09 kg/m² Physical Exam  Vitals and nursing note reviewed  Constitutional:       Appearance: Normal appearance  HENT:      Head: Normocephalic and atraumatic  Right Ear: Tympanic membrane, ear canal and external ear normal       Left Ear: Tympanic membrane, ear canal and external ear normal       Nose: Nose normal       Mouth/Throat:      Mouth: Mucous membranes are moist       Pharynx: Oropharynx is clear  Eyes:      Extraocular Movements: Extraocular movements intact  Conjunctiva/sclera: Conjunctivae normal    Neck:      Thyroid: No thyromegaly  Vascular: No carotid bruit  Cardiovascular:      Rate and Rhythm: Normal rate and regular rhythm  Pulses: Normal pulses  Heart sounds: Normal heart sounds  Pulmonary:      Effort: Pulmonary effort is normal       Breath sounds: Normal breath sounds  Abdominal:      General: Abdomen is flat  Bowel sounds are normal       Palpations: Abdomen is soft  There is no hepatomegaly, splenomegaly or mass  Tenderness: There is no abdominal tenderness  There is no right CVA tenderness or left CVA tenderness  Genitourinary:     General: Normal vulva  Urethra: No prolapse, urethral pain, urethral swelling or urethral lesion  Vagina: Normal       Cervix: Normal       Uterus: Normal  Not tender  Adnexa: Right adnexa normal and left adnexa normal       Rectum: Normal    Musculoskeletal:         General: Normal range of motion  Cervical back: Normal range of motion and neck supple  Right lower leg: No edema  Left lower leg: No edema  Lymphadenopathy:      Cervical: No cervical adenopathy  Lower Body: No right inguinal adenopathy  Skin:     General: Skin is warm and dry  Neurological:      General: No focal deficit present  Mental Status: She is alert and oriented to person, place, and time     Psychiatric:         Mood and Affect: Mood normal          Behavior: Behavior normal          Thought Content: Thought content normal          Judgment: Judgment normal           JUAN Curran [id number]

## 2022-11-07 NOTE — PATIENT INSTRUCTIONS
Wellness Visit for Adults   AMBULATORY CARE:   A wellness visit  is when you see your healthcare provider to get screened for health problems  Your healthcare provider will also give you advice on how to stay healthy  Write down your questions so you remember to ask them  Ask your healthcare provider how often you should have a wellness visit  What happens at a wellness visit:  Your healthcare provider will ask about your health, and your family history of health problems  This includes high blood pressure, heart disease, and cancer  He or she will ask if you have symptoms that concern you, if you smoke, and about your mood  You may also be asked about your intake of medicines, supplements, food, and alcohol  Any of the following may be done:  · Your weight  will be checked  Your height may also be checked so your body mass index (BMI) can be calculated  Your BMI shows if you are at a healthy weight  · Your blood pressure  and heart rate will be checked  Your temperature may also be checked  · Blood and urine tests  may be done  Blood tests may be done to check your cholesterol levels  Abnormal cholesterol levels increase your risk for heart disease and stroke  You may also need a blood or urine test to check for diabetes if you are at increased risk  Urine tests may be done to look for signs of an infection or kidney disease  · A physical exam  includes checking your heartbeat and lungs with a stethoscope  Your healthcare provider may also check your skin to look for sun damage  · Screening tests  may be recommended  A screening test is done to check for diseases that may not cause symptoms  The screening tests you may need depend on your age, gender, family history, and lifestyle habits  For example, colorectal screening may be recommended if you are 48years old or older  Screening tests you need if you are a woman:   · A Pap smear  is used to screen for cervical cancer   Pap smears are usually done every 3 to 5 years depending on your age  You may need them more often if you have had abnormal Pap smear test results in the past  Ask your healthcare provider how often you should have a Pap smear  · A mammogram  is an x-ray of your breasts to screen for breast cancer  Experts recommend mammograms every 2 years starting at age 48 years  You may need a mammogram at age 52 years or younger if you have an increased risk for breast cancer  Talk to your healthcare provider about when you should start having mammograms and how often you need them  Vaccines you may need:   · Get an influenza vaccine  every year  The influenza vaccine protects you from the flu  Several types of viruses cause the flu  The viruses change over time, so new vaccines are made each year  · Get a tetanus-diphtheria (Td) booster vaccine  every 10 years  This vaccine protects you against tetanus and diphtheria  Tetanus is a severe infection that may cause painful muscle spasms and lockjaw  Diphtheria is a severe bacterial infection that causes a thick covering in the back of your mouth and throat  · Get a human papillomavirus (HPV) vaccine  if you are female and aged 23 to 32 or male 23 to 24 and never received it  This vaccine protects you from HPV infection  HPV is the most common infection spread by sexual contact  HPV may also cause vaginal, penile, and anal cancers  · Get a pneumococcal vaccine  if you are aged 72 years or older  The pneumococcal vaccine is an injection given to protect you from pneumococcal disease  Pneumococcal disease is an infection caused by pneumococcal bacteria  The infection may cause pneumonia, meningitis, or an ear infection  · Get a shingles vaccine  if you are 60 or older, even if you have had shingles before  The shingles vaccine is an injection to protect you from the varicella-zoster virus  This is the same virus that causes chickenpox   Shingles is a painful rash that develops in people who had chickenpox or have been exposed to the virus  How to eat healthy:  My Plate is a model for planning healthy meals  It shows the types and amounts of foods that should go on your plate  Fruits and vegetables make up about half of your plate, and grains and protein make up the other half  A serving of dairy is included on the side of your plate  The amount of calories and serving sizes you need depends on your age, gender, weight, and height  Examples of healthy foods are listed below:  · Eat a variety of vegetables  such as dark green, red, and orange vegetables  You can also include canned vegetables low in sodium (salt) and frozen vegetables without added butter or sauces  · Eat a variety of fresh fruits , canned fruit in 100% juice, frozen fruit, and dried fruit  · Include whole grains  At least half of the grains you eat should be whole grains  Examples include whole-wheat bread, wheat pasta, brown rice, and whole-grain cereals such as oatmeal     · Eat a variety of protein foods such as seafood (fish and shellfish), lean meat, and poultry without skin (turkey and chicken)  Examples of lean meats include pork leg, shoulder, or tenderloin, and beef round, sirloin, tenderloin, and extra lean ground beef  Other protein foods include eggs and egg substitutes, beans, peas, soy products, nuts, and seeds  · Choose low-fat dairy products such as skim or 1% milk or low-fat yogurt, cheese, and cottage cheese  · Limit unhealthy fats  such as butter, hard margarine, and shortening  Exercise:  Exercise at least 30 minutes per day on most days of the week  Some examples of exercise include walking, biking, dancing, and swimming  You can also fit in more physical activity by taking the stairs instead of the elevator or parking farther away from stores  Include muscle strengthening activities 2 days each week  Regular exercise provides many health benefits   It helps you manage your weight, and decreases your risk for type 2 diabetes, heart disease, stroke, and high blood pressure  Exercise can also help improve your mood  Ask your healthcare provider about the best exercise plan for you  General health and safety guidelines:   · Do not smoke  Nicotine and other chemicals in cigarettes and cigars can cause lung damage  Ask your healthcare provider for information if you currently smoke and need help to quit  E-cigarettes or smokeless tobacco still contain nicotine  Talk to your healthcare provider before you use these products  · Limit alcohol  A drink of alcohol is 12 ounces of beer, 5 ounces of wine, or 1½ ounces of liquor  · Lose weight, if needed  Being overweight increases your risk of certain health conditions  These include heart disease, high blood pressure, type 2 diabetes, and certain types of cancer  · Protect your skin  Do not sunbathe or use tanning beds  Use sunscreen with a SPF 15 or higher  Apply sunscreen at least 15 minutes before you go outside  Reapply sunscreen every 2 hours  Wear protective clothing, hats, and sunglasses when you are outside  · Drive safely  Always wear your seatbelt  Make sure everyone in your car wears a seatbelt  A seatbelt can save your life if you are in an accident  Do not use your cell phone when you are driving  This could distract you and cause an accident  Pull over if you need to make a call or send a text message  · Practice safe sex  Use latex condoms if are sexually active and have more than one partner  Your healthcare provider may recommend screening tests for sexually transmitted infections (STIs)  · Wear helmets, lifejackets, and protective gear  Always wear a helmet when you ride a bike or motorcycle, go skiing, or play sports that could cause a head injury  Wear protective equipment when you play sports  Wear a lifejacket when you are on a boat or doing water sports      © Copyright [employer] 2022 Information is for End User's use only and may not be sold, redistributed or otherwise used for commercial purposes  All illustrations and images included in CareNotes® are the copyrighted property of [employer]  or Raquel Strickland  The above information is an  only  It is not intended as medical advice for individual conditions or treatments  Talk to your doctor, nurse or pharmacist before following any medical regimen to see if it is safe and effective for you  Cholesterol and Your Health   AMBULATORY CARE:   Cholesterol  is a waxy, fat-like substance  Your body uses cholesterol to make hormones and new cells, and to protect nerves  Cholesterol is made by your body  It also comes from certain foods you eat, such as meat and dairy products  Your healthcare provider can help you set goals for your cholesterol levels  He or she can help you create a plan to meet your goals  Cholesterol level goals: Your cholesterol level goals depend on your risk for heart disease, your age, and your other health conditions  The following are general guidelines:  · Total cholesterol  includes low-density lipoprotein (LDL), high-density lipoprotein (HDL), and triglyceride levels  The total cholesterol level should be lower than 200 mg/dL and is best at about 150 mg/dL  · LDL cholesterol  is called bad cholesterol  because it forms plaque in your arteries  As plaque builds up, your arteries become narrow, and less blood flows through  When plaque decreases blood flow to your heart, you may have chest pain  If plaque completely blocks an artery that brings blood to your heart, you may have a heart attack  Plaque can break off and form blood clots  Blood clots may block arteries in your brain and cause a stroke  The level should be less than 130 mg/dL and is best at about 100 mg/dL  · HDL cholesterol  is called good cholesterol  because it helps remove LDL cholesterol from your arteries   It does this by attaching to LDL cholesterol and carrying it to your liver  Your liver breaks down LDL cholesterol so your body can get rid of it  High levels of HDL cholesterol can help prevent a heart attack and stroke  Low levels of HDL cholesterol can increase your risk for heart disease, heart attack, and stroke  The level should be 60 mg/dL or higher  · Triglycerides  are a type of fat that store energy from foods you eat  High levels of triglycerides also cause plaque buildup  This can increase your risk for a heart attack or stroke  If your triglyceride level is high, your LDL cholesterol level may also be high  The level should be less than 150 mg/dL  Any of the following can increase your risk for high cholesterol:   · Smoking cigarettes    · Being overweight or obese, or not getting enough exercise    · Drinking large amounts of alcohol    · A medical condition such as hypertension (high blood pressure) or diabetes    · Certain genes passed from your parents to you    · Age older than 65 years    What you need to know about having your cholesterol levels checked: Adults 21to 39years of age should have their cholesterol levels checked every 4 to 6 years  Adults 45 years or older should have their cholesterol checked every 1 to 2 years  You may need your cholesterol checked more often, or at a younger age, if you have risk factors for heart disease  You may also need to have your cholesterol checked more often if you have other health conditions, such as diabetes  Blood tests are used to check cholesterol levels  Blood tests measure your levels of triglycerides, LDL cholesterol, and HDL cholesterol  How healthy fats affect your cholesterol levels:  Healthy fats, also called unsaturated fats, help lower LDL cholesterol and triglyceride levels  Healthy fats include the following:  · Monounsaturated fats  are found in foods such as olive oil, canola oil, avocado, nuts, and olives      · Polyunsaturated fats,  such as omega 3 fats, are found in fish, such as salmon, trout, and tuna  They can also be found in plant foods such as flaxseed, walnuts, and soybeans  How unhealthy fats affect your cholesterol levels:  Unhealthy fats increase LDL cholesterol and triglyceride levels  They are found in foods high in cholesterol, saturated fat, and trans fat:  · Cholesterol  is found in eggs, dairy, and meat  · Saturated fat  is found in butter, cheese, ice cream, whole milk, and coconut oil  Saturated fat is also found in meat, such as sausage, hot dogs, and bologna  · Trans fat  is found in liquid oils and is used in fried and baked foods  Foods that contain trans fats include chips, crackers, muffins, sweet rolls, microwave popcorn, and cookies  Treatment  for high cholesterol will also decrease your risk of heart disease, heart attack, and stroke  Treatment may include any of the following:  · Lifestyle changes  may include food, exercise, weight loss, and quitting smoking  You may also need to decrease the amount of alcohol you drink  Your healthcare provider will want you to start with lifestyle changes  Other treatment may be added if lifestyle changes are not enough  Your healthcare provider may recommend you work with a team to manage hyperlipidemia  The team may include medical experts such as a dietitian, an exercise or physical therapist, and a behavior therapist  Your family members may be included in helping you create lifestyle changes  · Medicines  may be given to lower your LDL cholesterol, triglyceride levels, or total cholesterol level  You may need medicines to lower your cholesterol if any of the following is true:    ? You have a history of stroke, TIA, unstable angina, or a heart attack  ? Your LDL cholesterol level is 190 mg/dL or higher  ? You are age 36 to 76 years, have diabetes or heart disease risk factors, and your LDL cholesterol is 70 mg/dL or higher      · Supplements  include fish oil, red yeast rice, and garlic  Fish oil may help lower your triglyceride and LDL cholesterol levels  It may also increase your HDL cholesterol level  Red yeast rice may help decrease your total cholesterol level and LDL cholesterol level  Garlic may help lower your total cholesterol level  Do not take any supplements without talking to your healthcare provider  Food changes you can make to lower your cholesterol levels:  A dietitian can help you create a healthy eating plan  He or she can show you how to read food labels and choose foods low in saturated fat, trans fats, and cholesterol  · Decrease the total amount of fat you eat  Choose lean meats, fat-free or 1% fat milk, and low-fat dairy products, such as yogurt and cheese  Try to limit or avoid red meats  Limit or do not eat fried foods or baked goods, such as cookies  · Replace unhealthy fats with healthy fats  Cook foods in olive oil or canola oil  Choose soft margarines that are low in saturated fat and trans fat  Seeds, nuts, and avocados are other examples of healthy fats  · Eat foods with omega-3 fats  Examples include salmon, tuna, mackerel, walnuts, and flaxseed  Eat fish 2 times per week  Pregnant women should not eat fish that have high levels of mercury, such as shark, swordfish, and rupa mackerel  · Increase the amount of high-fiber foods you eat  High-fiber foods can help lower your LDL cholesterol  Aim to get between 20 and 30 grams of fiber each day  Fruits and vegetables are high in fiber  Eat at least 5 servings each day  Other high-fiber foods are whole-grain or whole-wheat breads, pastas, or cereals, and brown rice  Eat 3 ounces of whole-grain foods each day  Increase fiber slowly  You may have abdominal discomfort, bloating, and gas if you add fiber to your diet too quickly  · Eat healthy protein foods  Examples include low-fat dairy products, skinless chicken and turkey, fish, and nuts      · Limit foods and drinks that are high in sugar  Your dietitian or healthcare provider can help you create daily limits for high-sugar foods and drinks  The limit may be lower if you have diabetes or another health condition  Limits can also help you lose weight if needed  Lifestyle changes you can make to lower your cholesterol levels:   · Maintain a healthy weight  Ask your healthcare provider what a healthy weight is for you  Ask him or her to help you create a weight loss plan if needed  Weight loss can decrease your total cholesterol and triglyceride levels  Weight loss may also help keep your blood pressure at a healthy level  · Be physically active throughout the day  Physical activity, such as exercise, can help lower your total cholesterol level and maintain a healthy weight  Physical activity can also help increase your HDL cholesterol level  Work with your healthcare provider to create an program that is right for you  Get at least 30 to 40 minutes of moderate physical activity most days of the week  Examples of exercise include brisk walking, swimming, or biking  Also include strength training at least 2 times each week  Your healthcare providers can help you create a physical activity plan  · Do not smoke  Nicotine and other chemicals in cigarettes and cigars can raise your cholesterol levels  Ask your healthcare provider for information if you currently smoke and need help to quit  E-cigarettes or smokeless tobacco still contain nicotine  Talk to your healthcare provider before you use these products  · Limit or do not drink alcohol  Alcohol can increase your triglyceride levels  Ask your healthcare provider before you drink alcohol  Ask how much is okay for you to drink in 24 hours or 1 week  Follow up with your doctor as directed:  Write down your questions so you remember to ask them during your visits    © Copyright [employer] 2022 Information is for End User's use only and may not be sold, redistributed or otherwise used for commercial purposes  All illustrations and images included in CareNotes® are the copyrighted property of [employer]  or Raquel Strickland  The above information is an  only  It is not intended as medical advice for individual conditions or treatments  Talk to your doctor, nurse or pharmacist before following any medical regimen to see if it is safe and effective for you

## 2023-01-05 ENCOUNTER — CLINICAL SUPPORT (OUTPATIENT)
Dept: FAMILY MEDICINE CLINIC | Facility: CLINIC | Age: 53
End: 2023-01-05

## 2023-01-05 DIAGNOSIS — Z30.42 ENCOUNTER FOR DEPO-PROVERA CONTRACEPTION: Primary | ICD-10-CM

## 2023-01-05 RX ORDER — MEDROXYPROGESTERONE ACETATE 150 MG/ML
150 INJECTION, SUSPENSION INTRAMUSCULAR ONCE
Status: COMPLETED | OUTPATIENT
Start: 2023-01-05 | End: 2023-01-05

## 2023-01-05 RX ADMIN — MEDROXYPROGESTERONE ACETATE 150 MG: 150 INJECTION, SUSPENSION INTRAMUSCULAR at 08:26

## 2023-01-05 NOTE — PROGRESS NOTES
Pt seen today for a nurse visit for a depo provera injection, administered in left deltoid per pt request  Pt tolerated it well      Next dose will be due between march 23- April 5th 2023 pt aware

## 2023-01-10 ENCOUNTER — APPOINTMENT (OUTPATIENT)
Dept: LAB | Facility: HOSPITAL | Age: 53
End: 2023-01-10

## 2023-01-10 ENCOUNTER — OFFICE VISIT (OUTPATIENT)
Dept: LAB | Facility: HOSPITAL | Age: 53
End: 2023-01-10

## 2023-01-10 ENCOUNTER — HOSPITAL ENCOUNTER (OUTPATIENT)
Dept: RADIOLOGY | Facility: HOSPITAL | Age: 53
Discharge: HOME | End: 2023-01-10

## 2023-01-10 DIAGNOSIS — Z01.818 PRE-OP EXAM: ICD-10-CM

## 2023-01-10 DIAGNOSIS — I10 HYPERTENSION, ESSENTIAL: ICD-10-CM

## 2023-01-10 DIAGNOSIS — H43.392 VITREOUS FLOATERS, LEFT: ICD-10-CM

## 2023-01-10 DIAGNOSIS — E03.9 HYPOTHYROIDISM, ADULT: ICD-10-CM

## 2023-01-10 DIAGNOSIS — F41.1 GENERALIZED ANXIETY DISORDER: ICD-10-CM

## 2023-01-10 LAB
ALBUMIN SERPL BCP-MCNC: 4.1 G/DL (ref 3.5–5)
ALP SERPL-CCNC: 47 U/L (ref 46–116)
ALT SERPL W P-5'-P-CCNC: 36 U/L (ref 12–78)
ANION GAP SERPL CALCULATED.3IONS-SCNC: 10 MMOL/L (ref 4–13)
AST SERPL W P-5'-P-CCNC: 18 U/L (ref 5–45)
BASOPHILS # BLD AUTO: 0.06 THOUSANDS/ÂΜL (ref 0–0.1)
BASOPHILS NFR BLD AUTO: 1 % (ref 0–1)
BILIRUB SERPL-MCNC: 1.27 MG/DL (ref 0.2–1)
BUN SERPL-MCNC: 21 MG/DL (ref 5–25)
CALCIUM SERPL-MCNC: 9.1 MG/DL (ref 8.3–10.1)
CHLORIDE SERPL-SCNC: 103 MMOL/L (ref 96–108)
CHOLEST SERPL-MCNC: 207 MG/DL
CO2 SERPL-SCNC: 27 MMOL/L (ref 21–32)
CREAT SERPL-MCNC: 0.93 MG/DL (ref 0.6–1.3)
EOSINOPHIL # BLD AUTO: 0.19 THOUSAND/ÂΜL (ref 0–0.61)
EOSINOPHIL NFR BLD AUTO: 3 % (ref 0–6)
ERYTHROCYTE [DISTWIDTH] IN BLOOD BY AUTOMATED COUNT: 12.8 % (ref 11.6–15.1)
GFR SERPL CREATININE-BSD FRML MDRD: 71 ML/MIN/1.73SQ M
GLUCOSE P FAST SERPL-MCNC: 113 MG/DL (ref 65–99)
HCT VFR BLD AUTO: 43 % (ref 34.8–46.1)
HDLC SERPL-MCNC: 53 MG/DL
HGB BLD-MCNC: 14.8 G/DL (ref 11.5–15.4)
IMM GRANULOCYTES # BLD AUTO: 0.02 THOUSAND/UL (ref 0–0.2)
IMM GRANULOCYTES NFR BLD AUTO: 0 % (ref 0–2)
LDLC SERPL CALC-MCNC: 126 MG/DL (ref 0–100)
LYMPHOCYTES # BLD AUTO: 2.12 THOUSANDS/ÂΜL (ref 0.6–4.47)
LYMPHOCYTES NFR BLD AUTO: 30 % (ref 14–44)
MCH RBC QN AUTO: 31.2 PG (ref 26.8–34.3)
MCHC RBC AUTO-ENTMCNC: 34.4 G/DL (ref 31.4–37.4)
MCV RBC AUTO: 91 FL (ref 82–98)
MONOCYTES # BLD AUTO: 0.51 THOUSAND/ÂΜL (ref 0.17–1.22)
MONOCYTES NFR BLD AUTO: 7 % (ref 4–12)
NEUTROPHILS # BLD AUTO: 4.2 THOUSANDS/ÂΜL (ref 1.85–7.62)
NEUTS SEG NFR BLD AUTO: 59 % (ref 43–75)
NRBC BLD AUTO-RTO: 0 /100 WBCS
PLATELET # BLD AUTO: 212 THOUSANDS/UL (ref 149–390)
PMV BLD AUTO: 11 FL (ref 8.9–12.7)
POTASSIUM SERPL-SCNC: 3.9 MMOL/L (ref 3.5–5.3)
PROT SERPL-MCNC: 7.5 G/DL (ref 6.4–8.4)
RBC # BLD AUTO: 4.75 MILLION/UL (ref 3.81–5.12)
SODIUM SERPL-SCNC: 140 MMOL/L (ref 135–147)
T4 FREE SERPL-MCNC: 1.15 NG/DL (ref 0.76–1.46)
TRIGL SERPL-MCNC: 138 MG/DL
TSH SERPL DL<=0.05 MIU/L-ACNC: 2.41 UIU/ML (ref 0.45–4.5)
WBC # BLD AUTO: 7.1 THOUSAND/UL (ref 4.31–10.16)

## 2023-01-11 ENCOUNTER — OFFICE VISIT (OUTPATIENT)
Dept: FAMILY MEDICINE CLINIC | Facility: CLINIC | Age: 53
End: 2023-01-11

## 2023-01-11 VITALS
TEMPERATURE: 98.1 F | SYSTOLIC BLOOD PRESSURE: 128 MMHG | BODY MASS INDEX: 33.11 KG/M2 | OXYGEN SATURATION: 98 % | DIASTOLIC BLOOD PRESSURE: 72 MMHG | WEIGHT: 206 LBS | HEIGHT: 66 IN | HEART RATE: 84 BPM

## 2023-01-11 DIAGNOSIS — Z01.818 PRE-OP EXAMINATION: Primary | ICD-10-CM

## 2023-01-11 DIAGNOSIS — H43.392 VITREOUS FLOATER, LEFT: ICD-10-CM

## 2023-01-11 LAB
ATRIAL RATE: 82 BPM
P AXIS: 31 DEGREES
PR INTERVAL: 140 MS
QRS AXIS: 9 DEGREES
QRSD INTERVAL: 88 MS
QT INTERVAL: 394 MS
QTC INTERVAL: 460 MS
T WAVE AXIS: 18 DEGREES
VENTRICULAR RATE: 82 BPM

## 2023-01-11 NOTE — PROGRESS NOTES
Subjective:     Yash Mehta is a 47 y o  female who presents to the office today for a preoperative consultation at the request of surgeon Abida Jaimes MD who plans on performing PARS Plana Vitrectomy, possible endo laser, and any related procedure left eye on   This consultation is requested for the specific conditions prompting preoperative evaluation (i e  because of potential affect on operative risk): general health  Planned anesthesia: surgeon's choice  The patient has the following known anesthesia issues: none  Patients bleeding risk: no recent abnormal bleeding  Patient does not have objections to receiving blood products if needed  The following portions of the patient's history were reviewed and updated as appropriate:   She  has a past medical history of Disease of thyroid gland and Hypertension  She   Patient Active Problem List    Diagnosis Date Noted   • Pre-op examination 2023   • Vitreous floater, left 2023   • Cellulitis of lip 2022   • Encounter for support and coordination of transition of care 2021   • Insomnia 2021   • Major depressive disorder, single episode, mild with anxious distress ([facility] [address] ) 2020   • Generalized anxiety disorder 2019   • Transition of care performed with sharing of clinical summary 2019   • Disorder of both eustachian tubes 2019   • Leiomyoma 2019   • Pelvic pain 2019   • Heart murmur 2019   • Seasonal allergic rhinitis due to pollen 2019   • Intramural, submucous, and subserous leiomyoma of uterus 2018   • Hypertriglyceridemia without hypercholesterolemia 2016   • Benign essential hypertension 2016   • Hypothyroidism 2014   • Abnormal mammogram 2014     She  has a past surgical history that includes  section, low transverse; Cholecystectomy; Tooth extraction; and IR uterine artery embolization (2019)    Her family history includes No Known Problems in her father and mother  She  reports that she has never smoked  She has never used smokeless tobacco  She reports that she does not currently use alcohol  She reports that she does not use drugs  Current Outpatient Medications   Medication Sig Dispense Refill   • buPROPion (WELLBUTRIN XL) 150 mg 24 hr tablet Take 1 tablet (150 mg total) by mouth every morning 90 tablet 1   • busPIRone (BUSPAR) 5 mg tablet Take 1 tablet (5 mg total) by mouth 2 (two) times a day 180 tablet 1   • hydrochlorothiazide (HYDRODIURIL) 25 mg tablet Take 1 tablet (25 mg total) by mouth every morning 90 tablet 1   • levothyroxine 150 mcg tablet Take 1 tablet (150 mcg total) by mouth daily 90 tablet 1   • medroxyPROGESTERone (DEPO-PROVERA) 150 mg/mL injection Inject 1 mL (150 mg total) into a muscle every 3 (three) months 1 mL 1   • zolpidem (AMBIEN CR) 6 25 MG CR tablet Take 1 tablet (6 25 mg total) by mouth daily at bedtime as needed for sleep 30 tablet 3     No current facility-administered medications for this visit  Current Outpatient Medications on File Prior to Visit   Medication Sig   • buPROPion (WELLBUTRIN XL) 150 mg 24 hr tablet Take 1 tablet (150 mg total) by mouth every morning   • busPIRone (BUSPAR) 5 mg tablet Take 1 tablet (5 mg total) by mouth 2 (two) times a day   • hydrochlorothiazide (HYDRODIURIL) 25 mg tablet Take 1 tablet (25 mg total) by mouth every morning   • levothyroxine 150 mcg tablet Take 1 tablet (150 mcg total) by mouth daily   • medroxyPROGESTERone (DEPO-PROVERA) 150 mg/mL injection Inject 1 mL (150 mg total) into a muscle every 3 (three) months   • zolpidem (AMBIEN CR) 6 25 MG CR tablet Take 1 tablet (6 25 mg total) by mouth daily at bedtime as needed for sleep     No current facility-administered medications on file prior to visit  She is allergic to pollen extract and shrimp extract allergy skin test - food allergy       Review of Systems  Pertinent items are noted in HPI  Objective:     /72 (BP Location: Left arm, Patient Position: Sitting, Cuff Size: Adult)   Pulse 84   Temp 98 1 °F (36 7 °C) (Tympanic)   Ht 5' 6" (1 676 m)   Wt 93 4 kg (206 lb)   SpO2 98%   BMI 33 25 kg/m²   General appearance: alert and oriented, in no acute distress  Head: Normocephalic, without obvious abnormality, atraumatic  Eyes: negative  Ears: normal  Nose: normal  Throat: lips, mucosa, and tongue normal; teeth and gums normal  Neck: no adenopathy, no carotid bruit, no JVD, supple, symmetrical, trachea midline and thyroid not enlarged, symmetric, no tenderness/mass/nodules  Back: symmetric, no curvature  ROM normal  No CVA tenderness  Lungs: clear to auscultation bilaterally  Heart: regular rate and rhythm, S1, S2 normal, no murmur, click, rub or gallop  Abdomen: soft, non-tender; bowel sounds normal; no masses,  no organomegaly  Extremities: extremities normal, warm and well-perfused; no cyanosis, clubbing, or edema  Pulses: 2+ and symmetric  Skin: Skin color, texture, turgor normal  No rashes or lesions  Lymph nodes: Cervical adenopathy: none  Neurologic: Grossly normal    Predictors of intubation difficulty:   Morbid obesity? no   Anatomically abnormal facies? no   Prominent incisors? no   Receding mandible? no   Short, thick neck? no   Neck range of motion: normal   Mallampati score: I (soft palate, uvula, fauces, and tonsillar pillars visible)   Dentition: No chipped, loose, or missing teeth      Cardiographics  ECG: sinus rhythm, long QT interval   Echocardiogram: not done    Imaging  Chest x-ray: normal     Lab Review   Office Visit on 01/10/2023   Component Date Value   • Ventricular Rate 01/10/2023 82    • Atrial Rate 01/10/2023 82    • MD Interval 01/10/2023 140    • QRSD Interval 01/10/2023 88    • QT Interval 01/10/2023 394    • QTC Interval 01/10/2023 460    • P Axis 01/10/2023 31    • QRS Axis 01/10/2023 9    • T Wave [place] 01/10/2023 18    Appointment on 01/10/2023   Component Date Value   • WBC 01/10/2023 7 10    • RBC 01/10/2023 4 75    • Hemoglobin 01/10/2023 14 8    • Hematocrit 01/10/2023 43 0    • MCV 01/10/2023 91    • MCH 01/10/2023 31 2    • MCHC 01/10/2023 34 4    • RDW 01/10/2023 12 8    • MPV 01/10/2023 11 0    • Platelets 29/77/1477 212    • nRBC 01/10/2023 0    • Neutrophils Relative 01/10/2023 59    • Immat GRANS % 01/10/2023 0    • Lymphocytes Relative 01/10/2023 30    • Monocytes Relative 01/10/2023 7    • Eosinophils Relative 01/10/2023 3    • Basophils Relative 01/10/2023 1    • Neutrophils Absolute 01/10/2023 4 20    • Immature Grans Absolute 01/10/2023 0 02    • Lymphocytes Absolute 01/10/2023 2 12    • Monocytes Absolute 01/10/2023 0 51    • Eosinophils Absolute 01/10/2023 0 19    • Basophils Absolute 01/10/2023 0 06    • Sodium 01/10/2023 140    • Potassium 01/10/2023 3 9    • Chloride 01/10/2023 103    • CO2 01/10/2023 27    • ANION GAP 01/10/2023 10    • BUN 01/10/2023 21    • Creatinine 01/10/2023 0 93    • Glucose, Fasting 01/10/2023 113 (H)    • Calcium 01/10/2023 9 1    • AST 01/10/2023 18    • ALT 01/10/2023 36    • Alkaline Phosphatase 01/10/2023 47    • Total Protein 01/10/2023 7 5    • Albumin 01/10/2023 4 1    • Total Bilirubin 01/10/2023 1 27 (H)    • eGFR 01/10/2023 71    • Cholesterol 01/10/2023 207 (H)    • Triglycerides 01/10/2023 138    • HDL, Direct 01/10/2023 53    • LDL Calculated 01/10/2023 126 (H)    • TSH 3RD GENERATON 01/10/2023 2  411    • Free T4 01/10/2023 1 15         Assessment:     47 y o  female with planned surgery as above  Known risk factors for perioperative complications: None    Difficulty with intubation is not anticipated  Cardiac Risk Estimation: LOW, CLEARED FOR SURGERY    Current medications which may produce withdrawal symptoms if withheld perioperatively: none      Plan:     1  Preoperative workup as follows none  2  Change in medication regimen before surgery: as per anesthesia    3  Prophylaxis for cardiac events with perioperative beta-blockers: not indicated  4  Invasive hemodynamic monitoring perioperatively: not indicated  5  Deep vein thrombosis prophylaxis postoperatively: none  6  Surveillance for postoperative MI with ECG immediately postoperatively and on postoperative days 1 and 2 AND troponin levels 24 hours postoperatively and on day 4 or hospital discharge (whichever comes first): not indicated    7  Other measures: none

## 2023-03-27 ENCOUNTER — CLINICAL SUPPORT (OUTPATIENT)
Dept: FAMILY MEDICINE CLINIC | Facility: CLINIC | Age: 53
End: 2023-03-27

## 2023-03-27 DIAGNOSIS — Z30.42 ENCOUNTER FOR DEPO-PROVERA CONTRACEPTION: Primary | ICD-10-CM

## 2023-03-27 RX ORDER — MEDROXYPROGESTERONE ACETATE 150 MG/ML
150 INJECTION, SUSPENSION INTRAMUSCULAR ONCE
Status: COMPLETED | OUTPATIENT
Start: 2023-03-27 | End: 2023-03-27

## 2023-03-27 RX ADMIN — MEDROXYPROGESTERONE ACETATE 150 MG: 150 INJECTION, SUSPENSION INTRAMUSCULAR at 08:22

## 2023-03-27 NOTE — PROGRESS NOTES
Pt presents today for a nurse visit for her Depo injection  Depo administered without incident in pt's left deltoid at pt's request      Next depo injection due between June 12th and June 26th

## 2023-05-01 DIAGNOSIS — E03.9 HYPOTHYROIDISM, ADULT: ICD-10-CM

## 2023-05-01 RX ORDER — LEVOTHYROXINE SODIUM 0.15 MG/1
TABLET ORAL
Qty: 90 TABLET | Refills: 1 | Status: SHIPPED | OUTPATIENT
Start: 2023-05-01

## 2023-06-01 DIAGNOSIS — Z00.00 HEALTH CARE MAINTENANCE: ICD-10-CM

## 2023-06-01 RX ORDER — MEDROXYPROGESTERONE ACETATE 150 MG/ML
150 INJECTION, SUSPENSION INTRAMUSCULAR
Qty: 1 ML | Refills: 1 | Status: SHIPPED | OUTPATIENT
Start: 2023-06-01

## 2023-06-13 ENCOUNTER — CLINICAL SUPPORT (OUTPATIENT)
Dept: FAMILY MEDICINE CLINIC | Facility: CLINIC | Age: 53
End: 2023-06-13

## 2023-06-13 DIAGNOSIS — Z30.42 ENCOUNTER FOR DEPO-PROVERA CONTRACEPTION: Primary | ICD-10-CM

## 2023-06-13 PROCEDURE — 96372 THER/PROPH/DIAG INJ SC/IM: CPT

## 2023-06-13 RX ORDER — MEDROXYPROGESTERONE ACETATE 150 MG/ML
150 INJECTION, SUSPENSION INTRAMUSCULAR ONCE
Status: COMPLETED | OUTPATIENT
Start: 2023-06-13 | End: 2023-06-13

## 2023-06-13 RX ADMIN — MEDROXYPROGESTERONE ACETATE 150 MG: 150 INJECTION, SUSPENSION INTRAMUSCULAR at 08:44

## 2023-06-13 NOTE — PROGRESS NOTES
Pt presents today for a nurse visit for a depo injection  Pt has supplied her own depo  Depo administered in pt's left deltoid at her request, without incident  Pt to return between August 29th and September 12th for her next dose  Due between 8/29/23 - 9/12/23

## 2023-07-19 DIAGNOSIS — F32.0 MAJOR DEPRESSIVE DISORDER, SINGLE EPISODE, MILD WITH ANXIOUS DISTRESS (HCC): ICD-10-CM

## 2023-07-19 DIAGNOSIS — F41.1 GENERALIZED ANXIETY DISORDER: ICD-10-CM

## 2023-07-19 DIAGNOSIS — I10 HYPERTENSION, ESSENTIAL: ICD-10-CM

## 2023-07-19 DIAGNOSIS — G47.00 INSOMNIA, UNSPECIFIED TYPE: ICD-10-CM

## 2023-07-21 RX ORDER — HYDROCHLOROTHIAZIDE 25 MG/1
TABLET ORAL
Qty: 90 TABLET | Refills: 0 | Status: SHIPPED | OUTPATIENT
Start: 2023-07-21

## 2023-07-21 RX ORDER — ZOLPIDEM TARTRATE 6.25 MG/1
6.25 TABLET, FILM COATED, EXTENDED RELEASE ORAL
Qty: 30 TABLET | Refills: 0 | Status: SHIPPED | OUTPATIENT
Start: 2023-07-21 | End: 2023-09-05

## 2023-07-21 RX ORDER — BUSPIRONE HYDROCHLORIDE 5 MG/1
TABLET ORAL
Qty: 180 TABLET | Refills: 0 | Status: SHIPPED | OUTPATIENT
Start: 2023-07-21

## 2023-07-21 RX ORDER — BUPROPION HYDROCHLORIDE 150 MG/1
TABLET ORAL
Qty: 90 TABLET | Refills: 0 | Status: SHIPPED | OUTPATIENT
Start: 2023-07-21

## 2023-08-18 DIAGNOSIS — E03.9 HYPOTHYROIDISM, ADULT: ICD-10-CM

## 2023-08-18 RX ORDER — LEVOTHYROXINE SODIUM 0.15 MG/1
TABLET ORAL
Qty: 90 TABLET | Refills: 1 | Status: SHIPPED | OUTPATIENT
Start: 2023-08-18

## 2023-08-29 ENCOUNTER — CLINICAL SUPPORT (OUTPATIENT)
Dept: FAMILY MEDICINE CLINIC | Facility: CLINIC | Age: 53
End: 2023-08-29
Payer: COMMERCIAL

## 2023-08-29 DIAGNOSIS — Z30.42 ENCOUNTER FOR DEPO-PROVERA CONTRACEPTION: Primary | ICD-10-CM

## 2023-08-29 PROCEDURE — 96372 THER/PROPH/DIAG INJ SC/IM: CPT

## 2023-08-29 RX ORDER — MEDROXYPROGESTERONE ACETATE 150 MG/ML
150 INJECTION, SUSPENSION INTRAMUSCULAR ONCE
Status: COMPLETED | OUTPATIENT
Start: 2023-08-29 | End: 2023-08-29

## 2023-08-29 RX ADMIN — MEDROXYPROGESTERONE ACETATE 150 MG: 150 INJECTION, SUSPENSION INTRAMUSCULAR at 10:11

## 2023-08-29 NOTE — PROGRESS NOTES
Pt presents today for a nurse visit for a depo injection. Pt has supplied her own depo. Given in the left deltoid. Patient will come back Nov 14-28 for next depo shot.

## 2023-09-03 DIAGNOSIS — G47.00 INSOMNIA, UNSPECIFIED TYPE: ICD-10-CM

## 2023-09-05 RX ORDER — ZOLPIDEM TARTRATE 6.25 MG/1
6.25 TABLET, FILM COATED, EXTENDED RELEASE ORAL
Qty: 30 TABLET | Refills: 0 | Status: SHIPPED | OUTPATIENT
Start: 2023-09-05

## 2023-09-12 ENCOUNTER — OFFICE VISIT (OUTPATIENT)
Dept: FAMILY MEDICINE CLINIC | Facility: CLINIC | Age: 53
End: 2023-09-12
Payer: COMMERCIAL

## 2023-09-12 VITALS
HEART RATE: 84 BPM | BODY MASS INDEX: 33.75 KG/M2 | DIASTOLIC BLOOD PRESSURE: 72 MMHG | TEMPERATURE: 98.2 F | WEIGHT: 210 LBS | OXYGEN SATURATION: 98 % | SYSTOLIC BLOOD PRESSURE: 124 MMHG | HEIGHT: 66 IN

## 2023-09-12 DIAGNOSIS — H26.9 CATARACT OF BOTH EYES, UNSPECIFIED CATARACT TYPE: ICD-10-CM

## 2023-09-12 DIAGNOSIS — Z01.818 PRE-OP EXAMINATION: Primary | ICD-10-CM

## 2023-09-12 DIAGNOSIS — H17.9 CORNEAL SCAR, LEFT EYE: ICD-10-CM

## 2023-09-12 PROBLEM — K13.0 CELLULITIS OF LIP: Status: RESOLVED | Noted: 2022-03-20 | Resolved: 2023-09-12

## 2023-09-12 PROCEDURE — 99243 OFF/OP CNSLTJ NEW/EST LOW 30: CPT | Performed by: PHYSICIAN ASSISTANT

## 2023-09-12 RX ORDER — PREDNISOLONE ACETATE 10 MG/ML
SUSPENSION/ DROPS OPHTHALMIC
COMMUNITY
Start: 2023-09-08

## 2023-09-12 RX ORDER — OFLOXACIN 3 MG/ML
SOLUTION/ DROPS OPHTHALMIC
COMMUNITY
Start: 2023-09-08

## 2023-09-12 NOTE — PROGRESS NOTES
Subjective:     Candice Guzman is a 47 y.o. female who presents to the office today for a preoperative consultation at the request of surgeon Dr. Dinesh Hand who plans on performing removal of scr tissue left cornea on . This consultation is requested for the specific conditions prompting preoperative evaluation (i.e. because of potential affect on operative risk): general health. Planned anesthesia: local and IV sedation. The patient has the following known anesthesia issues: none. Patients bleeding risk: no recent abnormal bleeding. The following portions of the patient's history were reviewed and updated as appropriate:   She  has a past medical history of Disease of thyroid gland and Hypertension. She   Patient Active Problem List    Diagnosis Date Noted   • Corneal scar, left eye 2023   • Cataract of both eyes 2023   • Pre-op examination 2023   • Vitreous floater, left 2023   • Encounter for support and coordination of transition of care 2021   • Insomnia 2021   • Major depressive disorder, single episode, mild with anxious distress ([address]) 2020   • Generalized anxiety disorder 2019   • Transition of care performed with sharing of clinical summary 2019   • Disorder of both eustachian tubes 2019   • Leiomyoma 2019   • Pelvic pain 2019   • Heart murmur 2019   • Seasonal allergic rhinitis due to pollen 2019   • Intramural, submucous, and subserous leiomyoma of uterus 2018   • Hypertriglyceridemia without hypercholesterolemia 2016   • Benign essential hypertension 2016   • Hypothyroidism 2014   • Abnormal mammogram 2014     She  has a past surgical history that includes  section, low transverse; Cholecystectomy; Tooth extraction; and IR uterine artery embolization (2019). Her family history includes No Known Problems in her father and mother.   She  reports that she has never smoked. She has never used smokeless tobacco. She reports that she does not currently use alcohol. She reports that she does not use drugs. Current Outpatient Medications   Medication Sig Dispense Refill   • buPROPion (WELLBUTRIN XL) 150 mg 24 hr tablet TAKE 1 TABLET BY MOUTH EVERY DAY IN THE MORNING 90 tablet 0   • busPIRone (BUSPAR) 5 mg tablet TAKE 1 TABLET BY MOUTH TWICE A  tablet 0   • hydrochlorothiazide (HYDRODIURIL) 25 mg tablet TAKE 1 TABLET BY MOUTH EVERY DAY IN THE MORNING 90 tablet 0   • levothyroxine 150 mcg tablet TAKE 1 TABLET BY MOUTH EVERY DAY 90 tablet 1   • medroxyPROGESTERone (DEPO-PROVERA) 150 mg/mL injection INJECT 1 ML (150 MG TOTAL) INTO A MUSCLE EVERY 3 (THREE) MONTHS 1 mL 1   • ofloxacin (OCUFLOX) 0.3 % ophthalmic solution 1 DROP 4 TIMES A DAY STARTING 3 DAYS PRIOR TO SURGERY 1 DROP AM OF SURGERY DAY TO LEFT EYE     • prednisoLONE acetate (PRED FORTE) 1 % ophthalmic suspension 1 DROP FOUR TIMES DAILY STARTING 3 DAYS PRIOR TO SURGERY. 1 DROP AM OF SURGERY DAY TO LEFT EYE     • zolpidem (AMBIEN CR) 6.25 MG CR tablet TAKE 1 TABLET (6.25 MG TOTAL) BY MOUTH DAILY AT BEDTIME AS NEEDED FOR SLEEP 30 tablet 0     No current facility-administered medications for this visit.      Current Outpatient Medications on File Prior to Visit   Medication Sig   • buPROPion (WELLBUTRIN XL) 150 mg 24 hr tablet TAKE 1 TABLET BY MOUTH EVERY DAY IN THE MORNING   • busPIRone (BUSPAR) 5 mg tablet TAKE 1 TABLET BY MOUTH TWICE A DAY   • hydrochlorothiazide (HYDRODIURIL) 25 mg tablet TAKE 1 TABLET BY MOUTH EVERY DAY IN THE MORNING   • levothyroxine 150 mcg tablet TAKE 1 TABLET BY MOUTH EVERY DAY   • medroxyPROGESTERone (DEPO-PROVERA) 150 mg/mL injection INJECT 1 ML (150 MG TOTAL) INTO A MUSCLE EVERY 3 (THREE) MONTHS   • ofloxacin (OCUFLOX) 0.3 % ophthalmic solution 1 DROP 4 TIMES A DAY STARTING 3 DAYS PRIOR TO SURGERY 1 DROP AM OF SURGERY DAY TO LEFT EYE   • prednisoLONE acetate (PRED FORTE) 1 % ophthalmic suspension 1 DROP FOUR TIMES DAILY STARTING 3 DAYS PRIOR TO SURGERY. 1 DROP AM OF SURGERY DAY TO LEFT EYE   • zolpidem (AMBIEN CR) 6.25 MG CR tablet TAKE 1 TABLET (6.25 MG TOTAL) BY MOUTH DAILY AT BEDTIME AS NEEDED FOR SLEEP     No current facility-administered medications on file prior to visit. She is allergic to pollen extract and shrimp extract allergy skin test - food allergy. .    Review of Systems  Pertinent items are noted in HPI. Objective:     /72 (BP Location: Left arm, Patient Position: Sitting, Cuff Size: Standard)   Pulse 84   Temp 98.2 °F (36.8 °C)   Ht 5' 6" (1.676 m)   Wt 95.3 kg (210 lb)   SpO2 98%   BMI 33.89 kg/m²   General appearance: alert and oriented, in no acute distress  Head: Normocephalic, without obvious abnormality, atraumatic  Ears: normal TM's and external ear canals both ears  Nose: Nares normal. Septum midline. Mucosa normal. No drainage or sinus tenderness. Throat: lips, mucosa, and tongue normal; teeth and gums normal  Neck: no adenopathy, no carotid bruit, no JVD, supple, symmetrical, trachea midline and thyroid not enlarged, symmetric, no tenderness/mass/nodules  Back: symmetric, no curvature. ROM normal. No CVA tenderness. Lungs: clear to auscultation bilaterally  Heart: regular rate and rhythm, S1, S2 normal and systolic murmur: early systolic 2/6, low pitch at 2nd right intercostal space  Abdomen: soft, non-tender; bowel sounds normal; no masses,  no organomegaly  Extremities: extremities normal, warm and well-perfused; no cyanosis, clubbing, or edema  Pulses: 2+ and symmetric  Lymph nodes: Cervical, supraclavicular, and axillary nodes normal.  Neurologic: Grossly normal    Predictors of intubation difficulty:   Morbid obesity? no   Anatomically abnormal facies?  no   Prominent incisors? no   Receding mandible? no   Short, thick neck? no   Neck range of motion: normal   Mallampati score: II (hard and soft palate, upper portion of tonsils anduvula visible)   Dentition: No chipped, loose, or missing teeth. Cardiographics  ECG: not done  Echocardiogram: not done    Imaging  Chest x-ray: not done     Lab Review   not applicable     Assessment:     47 y.o. female with planned surgery as above. Known risk factors for perioperative complications: None    Difficulty with intubation is not anticipated. Cardiac Risk Estimation: Low, cleared for surgery    Current medications which may produce withdrawal symptoms if withheld perioperatively: none      Plan:     1. Preoperative workup as follows none. 2. Change in medication regimen before surgery: none, continue medication regimen including morning of surgery, with sip of water. 3. Prophylaxis for cardiac events with perioperative beta-blockers: not indicated. 4. Invasive hemodynamic monitoring perioperatively: not indicated. 5. Deep vein thrombosis prophylaxis postoperatively:regimen to be chosen by surgical team.  6. Surveillance for postoperative MI with ECG immediately postoperatively and on postoperative days 1 and 2 AND troponin levels 24 hours postoperatively and on day 4 or hospital discharge (whichever comes first): not indicated.   7. Other measures: none

## 2023-10-11 DIAGNOSIS — F32.0 MAJOR DEPRESSIVE DISORDER, SINGLE EPISODE, MILD WITH ANXIOUS DISTRESS (HCC): ICD-10-CM

## 2023-10-11 RX ORDER — BUPROPION HYDROCHLORIDE 150 MG/1
TABLET ORAL
Qty: 30 TABLET | Refills: 2 | Status: SHIPPED | OUTPATIENT
Start: 2023-10-11

## 2023-10-15 ENCOUNTER — PATIENT MESSAGE (OUTPATIENT)
Dept: FAMILY MEDICINE CLINIC | Facility: CLINIC | Age: 53
End: 2023-10-15

## 2023-10-17 NOTE — PATIENT COMMUNICATION
Letter done and signed by Javier Maria - spoke to pt and faxed to Dr Odonnell's office. Confirmation received.

## 2023-10-18 ENCOUNTER — TELEPHONE (OUTPATIENT)
Dept: FAMILY MEDICINE CLINIC | Facility: CLINIC | Age: 53
End: 2023-10-18

## 2023-10-18 NOTE — TELEPHONE ENCOUNTER
Pt called again -- [address] in [place] needs additional documentation  for pts 3rd sx - asking Rebecca to sign & date previous chart notes from 09/12/2023, to sign and date previous Hx and PE Clearance Form from 09/12/2023. Notes they received new clearance letter faxed yesterday. Reprinted, dated, signed by Ericka Lucia, faxed to # [phone], confirmation fax received. Pt aware. Scanned. No further action is needed at this time.

## 2023-11-04 DIAGNOSIS — F41.1 GENERALIZED ANXIETY DISORDER: ICD-10-CM

## 2023-11-04 DIAGNOSIS — F32.0 MAJOR DEPRESSIVE DISORDER, SINGLE EPISODE, MILD WITH ANXIOUS DISTRESS (HCC): ICD-10-CM

## 2023-11-04 RX ORDER — BUPROPION HYDROCHLORIDE 150 MG/1
TABLET ORAL
Qty: 90 TABLET | Refills: 1 | Status: SHIPPED | OUTPATIENT
Start: 2023-11-04

## 2023-11-05 DIAGNOSIS — I10 HYPERTENSION, ESSENTIAL: ICD-10-CM

## 2023-11-05 RX ORDER — HYDROCHLOROTHIAZIDE 25 MG/1
TABLET ORAL
Qty: 30 TABLET | Refills: 2 | Status: SHIPPED | OUTPATIENT
Start: 2023-11-05

## 2023-11-07 RX ORDER — BUSPIRONE HYDROCHLORIDE 5 MG/1
TABLET ORAL
Qty: 60 TABLET | Refills: 2 | Status: SHIPPED | OUTPATIENT
Start: 2023-11-07

## 2023-11-15 DIAGNOSIS — G47.00 INSOMNIA, UNSPECIFIED TYPE: ICD-10-CM

## 2023-11-15 DIAGNOSIS — Z00.00 HEALTH CARE MAINTENANCE: ICD-10-CM

## 2023-11-16 RX ORDER — ZOLPIDEM TARTRATE 6.25 MG/1
6.25 TABLET, FILM COATED, EXTENDED RELEASE ORAL
Qty: 30 TABLET | Refills: 2 | Status: SHIPPED | OUTPATIENT
Start: 2023-11-16

## 2023-11-16 RX ORDER — MEDROXYPROGESTERONE ACETATE 150 MG/ML
150 INJECTION, SUSPENSION INTRAMUSCULAR
Qty: 1 ML | Refills: 1 | Status: SHIPPED | OUTPATIENT
Start: 2023-11-16

## 2023-11-16 NOTE — TELEPHONE ENCOUNTER
Pt requesting refill sof Zolpidem 6.25 mg and her Depo Provera to be filled at [facility] in [place]

## 2023-11-16 NOTE — TELEPHONE ENCOUNTER
Medication:  PDMP   1 [id number] 09/05/2023 09/05/2023 Zolpidem Tartrate (Tablet, Extended Release) 30.0 30 6.25 MG NA MERCEDEZ MELCHOR-FOUZIA [facility] [employer] 0 / 0 [place]    1 [id number] 07/24/2023 07/21/2023 Zolpidem Tartrate (Tablet, Extended Release) 30.0 30 6.25 MG NA ZELALEM COPE [facility] [employer] 0 / 0 [place] [id number] 04/16/2023 11/07/2022 Zolpidem Tartrate (Tablet, Extended Release) 30.0 30 6.25 MG NA MERCEDEZ MELCHOR-FOUZIA [facility] [employer] 3 / 3 [place]    1 [id number] 02/26/2023 11/07/2022 Zolpidem Tartrate (Tablet, Extended Release) 30.0 30 6.25 MG NA MERCEDEZ GARCÍA [facility]  [employer] 2 / 3 [place]          Active agreement on file -No

## 2023-11-21 ENCOUNTER — CLINICAL SUPPORT (OUTPATIENT)
Dept: FAMILY MEDICINE CLINIC | Facility: CLINIC | Age: 53
End: 2023-11-21
Payer: COMMERCIAL

## 2023-11-21 DIAGNOSIS — Z30.42 ENCOUNTER FOR DEPO-PROVERA CONTRACEPTION: Primary | ICD-10-CM

## 2023-11-21 PROCEDURE — 96372 THER/PROPH/DIAG INJ SC/IM: CPT

## 2023-11-21 RX ORDER — MEDROXYPROGESTERONE ACETATE 150 MG/ML
150 INJECTION, SUSPENSION INTRAMUSCULAR ONCE
Status: COMPLETED | OUTPATIENT
Start: 2023-11-21 | End: 2023-11-21

## 2023-11-21 RX ADMIN — MEDROXYPROGESTERONE ACETATE 150 MG: 150 INJECTION, SUSPENSION INTRAMUSCULAR at 08:22

## 2023-11-21 NOTE — PROGRESS NOTES
Patient presents for a depo shot. Given in the left deltoid. Patient tolerated well.  Patients next depo shot is Feb 6-20

## 2023-12-01 DIAGNOSIS — F32.0 MAJOR DEPRESSIVE DISORDER, SINGLE EPISODE, MILD WITH ANXIOUS DISTRESS (HCC): ICD-10-CM

## 2023-12-01 DIAGNOSIS — I10 HYPERTENSION, ESSENTIAL: ICD-10-CM

## 2023-12-01 DIAGNOSIS — F41.1 GENERALIZED ANXIETY DISORDER: ICD-10-CM

## 2023-12-01 RX ORDER — HYDROCHLOROTHIAZIDE 25 MG/1
TABLET ORAL
Qty: 90 TABLET | Refills: 0 | Status: SHIPPED | OUTPATIENT
Start: 2023-12-01

## 2023-12-01 RX ORDER — BUSPIRONE HYDROCHLORIDE 5 MG/1
TABLET ORAL
Qty: 180 TABLET | Refills: 1 | Status: SHIPPED | OUTPATIENT
Start: 2023-12-01

## 2023-12-01 RX ORDER — BUPROPION HYDROCHLORIDE 150 MG/1
150 TABLET, EXTENDED RELEASE ORAL 2 TIMES DAILY
Qty: 180 TABLET | Refills: 1 | Status: SHIPPED | OUTPATIENT
Start: 2023-12-01

## 2023-12-12 ENCOUNTER — OFFICE VISIT (OUTPATIENT)
Dept: FAMILY MEDICINE CLINIC | Facility: CLINIC | Age: 53
End: 2023-12-12
Payer: COMMERCIAL

## 2023-12-12 VITALS
HEART RATE: 95 BPM | BODY MASS INDEX: 34.39 KG/M2 | TEMPERATURE: 98.4 F | WEIGHT: 214 LBS | DIASTOLIC BLOOD PRESSURE: 82 MMHG | SYSTOLIC BLOOD PRESSURE: 122 MMHG | HEIGHT: 66 IN | OXYGEN SATURATION: 96 %

## 2023-12-12 DIAGNOSIS — E78.1 HYPERTRIGLYCERIDEMIA WITHOUT HYPERCHOLESTEROLEMIA: ICD-10-CM

## 2023-12-12 DIAGNOSIS — E03.9 ACQUIRED HYPOTHYROIDISM: ICD-10-CM

## 2023-12-12 DIAGNOSIS — Z13.21 ENCOUNTER FOR VITAMIN DEFICIENCY SCREENING: ICD-10-CM

## 2023-12-12 DIAGNOSIS — I10 BENIGN ESSENTIAL HYPERTENSION: ICD-10-CM

## 2023-12-12 DIAGNOSIS — Z00.00 ANNUAL PHYSICAL EXAM: Primary | ICD-10-CM

## 2023-12-12 PROCEDURE — 99396 PREV VISIT EST AGE 40-64: CPT | Performed by: PHYSICIAN ASSISTANT

## 2023-12-12 NOTE — PROGRESS NOTES
[address]    NAME: Catherine Grey  AGE: 47 y.o. SEX: female  : 1970     DATE: 2023     Assessment and Plan:     Problem List Items Addressed This Visit        Endocrine    Hypothyroidism    Relevant Orders    CBC and differential    TSH, 3rd generation with Free T4 reflex       Cardiovascular and Mediastinum    Benign essential hypertension    Relevant Orders    CBC and differential    Comprehensive metabolic panel       Other    Hypertriglyceridemia without hypercholesterolemia    Relevant Orders    CBC and differential    Lipid Panel with Direct LDL reflex   Other Visit Diagnoses     Annual physical exam    -  Primary    Encounter for vitamin deficiency screening        Relevant Orders    Vitamin D 25 hydroxy            Immunizations and preventive care screenings were discussed with patient today. Appropriate education was printed on patient's after visit summary. Counseling:  Dental Health: discussed importance of regular tooth brushing, flossing, and dental visits. Injury prevention: discussed safety/seat belts, safety helmets, smoke detectors, carbon dioxide detectors, and smoking near bedding or upholstery. Exercise: the importance of regular exercise/physical activity was discussed. Recommend exercise 3-5 times per week for at least 30 minutes. BMI Counseling: Body mass index is 34.54 kg/m². The BMI is above normal. Nutrition recommendations include encouraging healthy choices of fruits and vegetables, consuming healthier snacks, limiting drinks that contain sugar, moderation in carbohydrate intake, increasing intake of lean protein, reducing intake of saturated and trans fat and reducing intake of cholesterol. Exercise recommendations include moderate physical activity 150 minutes/week. No pharmacotherapy was ordered.  Rationale for BMI follow-up plan is due to patient being overweight or obese. Return in about 6 months (around 6/12/2024) for [facility]. Chief Complaint:     Chief Complaint   Patient presents with   • Physical Exam      History of Present Illness:     Adult Annual Physical   Patient here for a comprehensive physical exam. The patient reports no problems. Diet and Physical Activity  Diet/Nutrition: well balanced diet. Exercise: walking. Depression Screening  PHQ-2/9 Depression Screening         General Health  Sleep: sleeps poorly. Hearing: normal - bilateral.  Vision: goes for regular eye exams. Dental: regular dental visits. /GYN Health  Follows with gynecology? yes   Patient is: perimenopausal  Last menstrual period: endometrial ablation  Contraceptive method: injectable contraception. Advanced Care Planning  Do you have an advanced directive? yes  Do you have a durable medical power of ? yes     Review of Systems:     Review of Systems   Constitutional:  Negative for appetite change, fatigue, fever and unexpected weight change. HENT:  Negative for dental problem, ear pain, hearing loss, nosebleeds, rhinorrhea and trouble swallowing. Eyes:  Negative for photophobia, pain, discharge and visual disturbance. Respiratory:  Negative for cough, chest tightness, shortness of breath and wheezing. Cardiovascular:  Negative for chest pain and palpitations. Gastrointestinal:  Negative for abdominal pain, blood in stool, constipation, diarrhea, nausea, rectal pain and vomiting. Endocrine: Negative for cold intolerance, polydipsia, polyphagia and polyuria. Genitourinary:  Negative for decreased urine volume, difficulty urinating, dysuria, enuresis, frequency, hematuria and urgency. Musculoskeletal:  Negative for arthralgias, back pain, gait problem, joint swelling, myalgias, neck pain and neck stiffness. Skin:  Negative for color change and rash.    Allergic/Immunologic: Negative for environmental allergies, food allergies and immunocompromised state. Neurological:  Negative for dizziness, seizures, speech difficulty, light-headedness and headaches. Hematological:  Negative for adenopathy. Does not bruise/bleed easily. Psychiatric/Behavioral:  Negative for behavioral problems, confusion, decreased concentration, self-injury and sleep disturbance. The patient is not nervous/anxious and is not hyperactive. Past Medical History:     Past Medical History:   Diagnosis Date   • Disease of thyroid gland    • Hypertension       Past Surgical History:     Past Surgical History:   Procedure Laterality Date   • CATARACT EXTRACTION Left 10/2023   • CATARACT EXTRACTION Right 2023   •  SECTION, LOW TRANSVERSE      Last Assessed: 5/15/2015   • CHOLECYSTECTOMY      Last Assessed: 5/15/2015    • IR UTERINE ARTERY EMBOLIZATION  2019   • TOOTH EXTRACTION      Last Assessed: 5/15/2015      Social History:     Social History     Socioeconomic History   • Marital status: /Civil Union     Spouse name: None   • Number of children: None   • Years of education: None   • Highest education level: None   Occupational History   • None   Tobacco Use   • Smoking status: Never   • Smokeless tobacco: Never   Vaping Use   • Vaping Use: Never used   Substance and Sexual Activity   • Alcohol use: Not Currently   • Drug use: Never   • Sexual activity: Yes     Partners: Male     Birth control/protection: Injection     Comment: Depo injection.    Other Topics Concern   • None   Social History Narrative    Always uses seat belt     Exercises regularly     Seeing a dentist      Social Determinants of Health     Financial Resource Strain: Not on file   Food Insecurity: No Food Insecurity (3/21/2022)    Hunger Vital Sign    • Worried About Running Out of Food in the Last Year: Never true    • Ran Out of Food in the Last Year: Never true   Transportation Needs: No Transportation Needs (3/21/2022)    PRAPARE - Transportation    • Lack of Transportation (Medical): No    • Lack of Transportation (Non-Medical): No   Physical Activity: Not on file   Stress: Not on file   Social Connections: Not on file   Intimate Partner Violence: Not on file   Housing Stability: Low Risk  (3/21/2022)    Housing Stability Vital Sign    • Unable to Pay for Housing in the Last Year: No    • Number of Places Lived in the Last Year: 1    • Unstable Housing in the Last Year: No      Family History:     Family History   Problem Relation Age of Onset   • No Known Problems Mother    • No Known Problems Father       Current Medications:     Current Outpatient Medications   Medication Sig Dispense Refill   • buPROPion (WELLBUTRIN SR) 150 mg 12 hr tablet TAKE 1 TABLET BY MOUTH TWICE A  tablet 1   • busPIRone (BUSPAR) 5 mg tablet TAKE 1 TABLET BY MOUTH TWICE A  tablet 1   • hydrochlorothiazide (HYDRODIURIL) 25 mg tablet TAKE 1 TABLET BY MOUTH EVERY DAY IN THE MORNING 90 tablet 0   • levothyroxine 150 mcg tablet TAKE 1 TABLET BY MOUTH EVERY DAY 90 tablet 1   • medroxyPROGESTERone (DEPO-PROVERA) 150 mg/mL injection Inject 1 mL (150 mg total) into a muscle every 3 (three) months 1 mL 1   • zolpidem (AMBIEN CR) 6.25 MG CR tablet Take 1 tablet (6.25 mg total) by mouth daily at bedtime as needed for sleep 30 tablet 2     No current facility-administered medications for this visit. Allergies: Allergies   Allergen Reactions   • Pollen Extract    • Shrimp Extract Allergy Skin Test - Food Allergy       Physical Exam:     /82 (BP Location: Left arm, Patient Position: Sitting, Cuff Size: Standard)   Pulse 95   Temp 98.4 °F (36.9 °C) (Tympanic)   Ht 5' 6" (1.676 m)   Wt 97.1 kg (214 lb)   SpO2 96%   BMI 34.54 kg/m²     Physical Exam  Vitals and nursing note reviewed. Constitutional:       Appearance: Normal appearance. HENT:      Head: Normocephalic and atraumatic.       Right Ear: Tympanic membrane, ear canal and external ear normal.      Left Ear: Tympanic membrane, ear canal and external ear normal.      Nose: Nose normal.      Mouth/Throat:      Mouth: Mucous membranes are moist.      Pharynx: Oropharynx is clear. Eyes:      Extraocular Movements: Extraocular movements intact. Conjunctiva/sclera: Conjunctivae normal.   Neck:      Thyroid: No thyromegaly. Vascular: No carotid bruit. Cardiovascular:      Rate and Rhythm: Normal rate and regular rhythm. Pulses: Normal pulses. Heart sounds: Normal heart sounds. Pulmonary:      Effort: Pulmonary effort is normal.      Breath sounds: Normal breath sounds. Abdominal:      General: Abdomen is flat. Bowel sounds are normal.      Palpations: Abdomen is soft. There is no hepatomegaly, splenomegaly or mass. Tenderness: There is no abdominal tenderness. There is no right CVA tenderness or left CVA tenderness. Musculoskeletal:         General: Normal range of motion. Cervical back: Normal range of motion and neck supple. Right lower leg: No edema. Left lower leg: No edema. Lymphadenopathy:      Cervical: No cervical adenopathy. Skin:     General: Skin is warm and dry. Neurological:      General: No focal deficit present. Mental Status: She is alert and oriented to person, place, and time. Psychiatric:         Mood and Affect: Mood normal.         Behavior: Behavior normal.         Thought Content:  Thought content normal.         Judgment: Judgment normal.          Aditya Mcfarlane PA-C  [address] [place] [address]

## 2023-12-12 NOTE — PATIENT INSTRUCTIONS
Wellness Visit for Adults   AMBULATORY CARE:   A wellness visit  is when you see your healthcare provider to get screened for health problems. Your healthcare provider will also give you advice on how to stay healthy. Write down your questions so you remember to ask them. Ask your healthcare provider how often you should have a wellness visit. What happens at a wellness visit:  Your healthcare provider will ask about your health, and your family history of health problems. This includes high blood pressure, heart disease, and cancer. He or she will ask if you have symptoms that concern you, if you smoke, and about your mood. You may also be asked about your intake of medicines, supplements, food, and alcohol. Any of the following may be done: Your weight  will be checked. Your height may also be checked so your body mass index (BMI) can be calculated. Your BMI shows if you are at a healthy weight. Your blood pressure  and heart rate will be checked. Your temperature may also be checked. Blood and urine tests  may be done. Blood tests may be done to check your cholesterol levels. Abnormal cholesterol levels increase your risk for heart disease and stroke. You may also need a blood or urine test to check for diabetes if you are at increased risk. Urine tests may be done to look for signs of an infection or kidney disease. A physical exam  includes checking your heartbeat and lungs with a stethoscope. Your healthcare provider may also check your skin to look for sun damage. Screening tests  may be recommended. A screening test is done to check for diseases that may not cause symptoms. The screening tests you may need depend on your age, gender, family history, and lifestyle habits. For example, colorectal screening may be recommended if you are 48years old or older. Screening tests you need if you are a woman:   A Pap smear  is used to screen for cervical cancer.  Pap smears are usually done every 3 to 5 years depending on your age. You may need them more often if you have had abnormal Pap smear test results in the past. Ask your healthcare provider how often you should have a Pap smear. A mammogram  is an x-ray of your breasts to screen for breast cancer. Experts recommend mammograms every 2 years starting at age 48 years. You may need a mammogram at age 52 years or younger if you have an increased risk for breast cancer. Talk to your healthcare provider about when you should start having mammograms and how often you need them. Vaccines you may need:   Get an influenza vaccine  every year. The influenza vaccine protects you from the flu. Several types of viruses cause the flu. The viruses change over time, so new vaccines are made each year. Get a tetanus-diphtheria (Td) booster vaccine  every 10 years. This vaccine protects you against tetanus and diphtheria. Tetanus is a severe infection that may cause painful muscle spasms and lockjaw. Diphtheria is a severe bacterial infection that causes a thick covering in the back of your mouth and throat. Get a human papillomavirus (HPV) vaccine  if you are female and aged 23 to 32 or male 23 to 24 and never received it. This vaccine protects you from HPV infection. HPV is the most common infection spread by sexual contact. HPV may also cause vaginal, penile, and anal cancers. Get a pneumococcal vaccine  if you are aged 72 years or older. The pneumococcal vaccine is an injection given to protect you from pneumococcal disease. Pneumococcal disease is an infection caused by pneumococcal bacteria. The infection may cause pneumonia, meningitis, or an ear infection. Get a shingles vaccine  if you are 60 or older, even if you have had shingles before. The shingles vaccine is an injection to protect you from the varicella-zoster virus. This is the same virus that causes chickenpox.  Shingles is a painful rash that develops in people who had chickenpox or have been exposed to the virus. How to eat healthy:  My Plate is a model for planning healthy meals. It shows the types and amounts of foods that should go on your plate. Fruits and vegetables make up about half of your plate, and grains and protein make up the other half. A serving of dairy is included on the side of your plate. The amount of calories and serving sizes you need depends on your age, gender, weight, and height. Examples of healthy foods are listed below:  Eat a variety of vegetables  such as dark green, red, and orange vegetables. You can also include canned vegetables low in sodium (salt) and frozen vegetables without added butter or sauces. Eat a variety of fresh fruits , canned fruit in 100% juice, frozen fruit, and dried fruit. Include whole grains. At least half of the grains you eat should be whole grains. Examples include whole-wheat bread, wheat pasta, brown rice, and whole-grain cereals such as oatmeal.    Eat a variety of protein foods such as seafood (fish and shellfish), lean meat, and poultry without skin (turkey and chicken). Examples of lean meats include pork leg, shoulder, or tenderloin, and beef round, sirloin, tenderloin, and extra lean ground beef. Other protein foods include eggs and egg substitutes, beans, peas, soy products, nuts, and seeds. Choose low-fat dairy products such as skim or 1% milk or low-fat yogurt, cheese, and cottage cheese. Limit unhealthy fats  such as butter, hard margarine, and shortening. Exercise:  Exercise at least 30 minutes per day on most days of the week. Some examples of exercise include walking, biking, dancing, and swimming. You can also fit in more physical activity by taking the stairs instead of the elevator or parking farther away from stores. Include muscle strengthening activities 2 days each week. Regular exercise provides many health benefits.  It helps you manage your weight, and decreases your risk for type 2 diabetes, heart disease, stroke, and high blood pressure. Exercise can also help improve your mood. Ask your healthcare provider about the best exercise plan for you. General health and safety guidelines:   Do not smoke. Nicotine and other chemicals in cigarettes and cigars can cause lung damage. Ask your healthcare provider for information if you currently smoke and need help to quit. E-cigarettes or smokeless tobacco still contain nicotine. Talk to your healthcare provider before you use these products. Limit alcohol. A drink of alcohol is 12 ounces of beer, 5 ounces of wine, or 1½ ounces of liquor. Lose weight, if needed. Being overweight increases your risk of certain health conditions. These include heart disease, high blood pressure, type 2 diabetes, and certain types of cancer. Protect your skin. Do not sunbathe or use tanning beds. Use sunscreen with a SPF 15 or higher. Apply sunscreen at least 15 minutes before you go outside. Reapply sunscreen every 2 hours. Wear protective clothing, hats, and sunglasses when you are outside. Drive safely. Always wear your seatbelt. Make sure everyone in your car wears a seatbelt. A seatbelt can save your life if you are in an accident. Do not use your cell phone when you are driving. This could distract you and cause an accident. Pull over if you need to make a call or send a text message. Practice safe sex. Use latex condoms if are sexually active and have more than one partner. Your healthcare provider may recommend screening tests for sexually transmitted infections (STIs). Wear helmets, lifejackets, and protective gear. Always wear a helmet when you ride a bike or motorcycle, go skiing, or play sports that could cause a head injury. Wear protective equipment when you play sports. Wear a lifejacket when you are on a boat or doing water sports.     © Copyright [employer]unes 2023 Information is for End User's use only and may not be sold, redistributed or otherwise used for commercial purposes. The above information is an  only. It is not intended as medical advice for individual conditions or treatments. Talk to your doctor, nurse or pharmacist before following any medical regimen to see if it is safe and effective for you. Cholesterol and Your Health   AMBULATORY CARE:   Cholesterol  is a waxy, fat-like substance. Your body uses cholesterol to make hormones and new cells, and to protect nerves. Cholesterol is made by your body. It also comes from certain foods you eat, such as meat and dairy products. Your healthcare provider can help you set goals for your cholesterol levels. Your provider can help you create a plan to meet your goals. Cholesterol level goals: Your cholesterol level goals depend on your risk for heart disease, your age, and your other health conditions. The following are general guidelines: Total cholesterol  includes low-density lipoprotein (LDL), high-density lipoprotein (HDL), and triglyceride levels. The total cholesterol level should be lower than 200 mg/dL and is best at about 150 mg/dL. LDL cholesterol  is called bad cholesterol  because it forms plaque in your arteries. As plaque builds up, your arteries become narrow, and less blood flows through. When plaque decreases blood flow to your heart, you may have chest pain. If plaque completely blocks an artery that brings blood to your heart, you may have a heart attack. Plaque can break off and form blood clots. Blood clots may block arteries in your brain and cause a stroke. The level should be less than 130 mg/dL and is best at about 100 mg/dL. HDL cholesterol  is called good cholesterol  because it helps remove LDL cholesterol from your arteries. It does this by attaching to LDL cholesterol and carrying it to your liver. Your liver breaks down LDL cholesterol so your body can get rid of it.  High levels of HDL cholesterol can help prevent a heart attack and stroke. Low levels of HDL cholesterol can increase your risk for heart disease, heart attack, and stroke. The level should be at least 40 mg/dL in males or at least 50 mg/dL in females. Triglycerides  are a type of fat that store energy from foods you eat. High levels of triglycerides also cause plaque buildup. This can increase your risk for a heart attack or stroke. If your triglyceride level is high, your LDL cholesterol level may also be high. The level should be less than 150 mg/dL. Any of the following can increase your risk for high cholesterol:   Smoking or drinking large amounts of alcohol    Having overweight or obesity, or not getting enough exercise    A medical condition such as hypertension (high blood pressure) or diabetes    A family history of high cholesterol    Age older than 72    What you need to know about having your cholesterol levels checked: Adults 21to 39years of age should have their cholesterol levels checked every 4 to 6 years. Adults 45 years or older should have their cholesterol checked every 1 to 2 years. You may need your cholesterol checked more often, or at a younger age, if you have risk factors for heart disease. You may also need to have your cholesterol checked more often if you have other health conditions, such as diabetes. Blood tests are used to check cholesterol levels. Blood tests measure your levels of triglycerides, LDL cholesterol, and HDL cholesterol. How healthy fats affect your cholesterol levels:  Healthy fats, also called unsaturated fats, help lower LDL cholesterol and triglyceride levels. Healthy fats include the following:  Monounsaturated fats  are found in foods such as olive oil, canola oil, avocado, nuts, and olives. Polyunsaturated fats,  such as omega 3 fats, are found in fish, such as salmon, trout, and tuna. They can also be found in plant foods such as flaxseed, walnuts, and soybeans.     How unhealthy fats affect your cholesterol levels: Unhealthy fats increase LDL cholesterol and triglyceride levels. They are found in foods high in cholesterol, saturated fat, and trans fat:  Cholesterol  is found in eggs, dairy, and meat. Saturated fat  is found in butter, cheese, ice cream, whole milk, and coconut oil. Saturated fat is also found in meat, such as sausage, hot dogs, and bologna. Trans fat  is found in liquid oils and is used in fried and baked foods. Foods that contain trans fats include chips, crackers, muffins, sweet rolls, microwave popcorn, and cookies. Treatment  for high cholesterol will also decrease your risk of heart disease, heart attack, and stroke. Treatment may include any of the following:  Lifestyle changes  may include food, exercise, weight loss, and quitting smoking. You may also need to decrease the amount of alcohol you drink. Your healthcare provider will want you to start with lifestyle changes. Other treatment may be added if lifestyle changes are not enough. Your healthcare provider may recommend you work with a team to manage hyperlipidemia. The team may include medical experts such as a dietitian, an exercise or physical therapist, and a behavior therapist. Your family members may be included in helping you create lifestyle changes. Medicines  may be given to lower your LDL cholesterol, triglyceride levels, or total cholesterol level. You may need medicines to lower your cholesterol if any of the following is true:    You have a history of stroke, TIA, unstable angina, or a heart attack. Your LDL cholesterol level is 190 mg/dL or higher. You are age 36 to 76 years, have diabetes or heart disease risk factors, and your LDL cholesterol is 70 mg/dL or higher. Supplements  include fish oil, red yeast rice, and garlic. Fish oil may help lower your triglyceride and LDL cholesterol levels. It may also increase your HDL cholesterol level.  Red yeast rice may help decrease your total cholesterol level and LDL cholesterol level. Garlic may help lower your total cholesterol level. Do not take any supplements without talking to your healthcare provider. Food changes you can make to lower your cholesterol levels:  A dietitian can help you create a healthy eating plan. Your dietitian can show you how to read food labels and choose foods low in saturated fat, trans fats, and cholesterol. Decrease the total amount of fat you eat. Choose lean meats, fat-free or 1% fat milk, and low-fat dairy products, such as yogurt and cheese. Try to limit or avoid red meats. Limit or do not eat fried foods or baked goods, such as cookies. Replace unhealthy fats with healthy fats. Cook foods in olive oil or canola oil. Choose soft margarines that are low in saturated fat and trans fat. Seeds, nuts, and avocados are other examples of healthy fats. Eat foods with omega-3 fats. Examples include salmon, tuna, mackerel, walnuts, and flaxseed. Eat fish 2 times per week. Pregnant women should not eat fish that have high levels of mercury, such as shark, swordfish, and rupa mackerel. Increase the amount of high-fiber foods you eat. High-fiber foods can help lower your LDL cholesterol. Aim to get between 20 and 30 grams of fiber each day. Fruits and vegetables are high in fiber. Eat at least 5 servings each day. Other high-fiber foods are whole-grain or whole-wheat breads, pastas, or cereals, and brown rice. Eat 3 ounces of whole-grain foods each day. Increase fiber slowly. You may have abdominal discomfort, bloating, and gas if you add fiber to your diet too quickly. Eat healthy protein foods. Examples include low-fat dairy products, skinless chicken and turkey, fish, and nuts. Limit foods and drinks that are high in sugar. Your dietitian or healthcare provider can help you create daily limits for high-sugar foods and drinks. The limit may be lower if you have diabetes or another health condition.  Limits can also help you lose weight if needed. Lifestyle changes you can make to lower your cholesterol levels:   Maintain a healthy weight. Ask your healthcare provider what a healthy weight is for you. Ask your provider to help you create a weight loss plan if needed. Weight loss can decrease your total cholesterol and triglyceride levels. Weight loss may also help keep your blood pressure at a healthy level. Be physically active throughout the day. Physical activity, such as exercise, can help lower your total cholesterol level and maintain a healthy weight. Physical activity can also help increase your HDL cholesterol level. Work with your healthcare provider to create an program that is right for you. Get at least 30 to 40 minutes of moderate physical activity most days of the week. Examples of exercise include brisk walking, swimming, or biking. Also include strength training at least 2 times each week. Your healthcare providers can help you create a physical activity plan. Do not smoke. Nicotine and other chemicals in cigarettes and cigars can raise your cholesterol levels. Ask your healthcare provider for information if you currently smoke and need help to quit. E-cigarettes or smokeless tobacco still contain nicotine. Talk to your healthcare provider before you use these products. Limit or do not drink alcohol. Alcohol can increase your triglyceride levels. Ask your healthcare provider before you drink alcohol. Ask how much is okay for you to drink in 24 hours or 1 week. Follow up with your doctor as directed:  Write down your questions so you remember to ask them during your visits. © Copyright Amado Nunes 2023 Information is for End User's use only and may not be sold, redistributed or otherwise used for commercial purposes. The above information is an  only. It is not intended as medical advice for individual conditions or treatments.  Talk to your doctor, nurse or pharmacist before following any medical regimen to see if it is safe and effective for you.

## 2023-12-29 ENCOUNTER — APPOINTMENT (OUTPATIENT)
Dept: LAB | Facility: HOSPITAL | Age: 53
End: 2023-12-29
Payer: COMMERCIAL

## 2023-12-29 DIAGNOSIS — E03.9 ACQUIRED HYPOTHYROIDISM: ICD-10-CM

## 2023-12-29 DIAGNOSIS — I10 BENIGN ESSENTIAL HYPERTENSION: ICD-10-CM

## 2023-12-29 DIAGNOSIS — Z13.21 ENCOUNTER FOR VITAMIN DEFICIENCY SCREENING: ICD-10-CM

## 2023-12-29 DIAGNOSIS — E78.1 HYPERTRIGLYCERIDEMIA WITHOUT HYPERCHOLESTEROLEMIA: ICD-10-CM

## 2023-12-29 LAB
25(OH)D3 SERPL-MCNC: 26 NG/ML (ref 30–100)
ALBUMIN SERPL BCP-MCNC: 4.5 G/DL (ref 3.5–5)
ALP SERPL-CCNC: 38 U/L (ref 34–104)
ALT SERPL W P-5'-P-CCNC: 45 U/L (ref 7–52)
ANION GAP SERPL CALCULATED.3IONS-SCNC: 8 MMOL/L
AST SERPL W P-5'-P-CCNC: 29 U/L (ref 13–39)
BASOPHILS # BLD AUTO: 0.06 THOUSANDS/ÂΜL (ref 0–0.1)
BASOPHILS NFR BLD AUTO: 1 % (ref 0–1)
BILIRUB SERPL-MCNC: 0.99 MG/DL (ref 0.2–1)
BUN SERPL-MCNC: 23 MG/DL (ref 5–25)
CALCIUM SERPL-MCNC: 9.7 MG/DL (ref 8.4–10.2)
CHLORIDE SERPL-SCNC: 105 MMOL/L (ref 96–108)
CHOLEST SERPL-MCNC: 208 MG/DL
CO2 SERPL-SCNC: 27 MMOL/L (ref 21–32)
CREAT SERPL-MCNC: 1.06 MG/DL (ref 0.6–1.3)
EOSINOPHIL # BLD AUTO: 0.19 THOUSAND/ÂΜL (ref 0–0.61)
EOSINOPHIL NFR BLD AUTO: 3 % (ref 0–6)
ERYTHROCYTE [DISTWIDTH] IN BLOOD BY AUTOMATED COUNT: 12.8 % (ref 11.6–15.1)
GFR SERPL CREATININE-BSD FRML MDRD: 60 ML/MIN/1.73SQ M
GLUCOSE P FAST SERPL-MCNC: 108 MG/DL (ref 65–99)
HCT VFR BLD AUTO: 45.5 % (ref 34.8–46.1)
HDLC SERPL-MCNC: 45 MG/DL
HGB BLD-MCNC: 15.4 G/DL (ref 11.5–15.4)
IMM GRANULOCYTES # BLD AUTO: 0.03 THOUSAND/UL (ref 0–0.2)
IMM GRANULOCYTES NFR BLD AUTO: 0 % (ref 0–2)
LDLC SERPL CALC-MCNC: 134 MG/DL (ref 0–100)
LYMPHOCYTES # BLD AUTO: 3.36 THOUSANDS/ÂΜL (ref 0.6–4.47)
LYMPHOCYTES NFR BLD AUTO: 45 % (ref 14–44)
MCH RBC QN AUTO: 31.6 PG (ref 26.8–34.3)
MCHC RBC AUTO-ENTMCNC: 33.8 G/DL (ref 31.4–37.4)
MCV RBC AUTO: 93 FL (ref 82–98)
MONOCYTES # BLD AUTO: 0.5 THOUSAND/ÂΜL (ref 0.17–1.22)
MONOCYTES NFR BLD AUTO: 7 % (ref 4–12)
NEUTROPHILS # BLD AUTO: 3.3 THOUSANDS/ÂΜL (ref 1.85–7.62)
NEUTS SEG NFR BLD AUTO: 44 % (ref 43–75)
NRBC BLD AUTO-RTO: 0 /100 WBCS
PLATELET # BLD AUTO: 210 THOUSANDS/UL (ref 149–390)
PMV BLD AUTO: 10.7 FL (ref 8.9–12.7)
POTASSIUM SERPL-SCNC: 3.9 MMOL/L (ref 3.5–5.3)
PROT SERPL-MCNC: 7.3 G/DL (ref 6.4–8.4)
RBC # BLD AUTO: 4.87 MILLION/UL (ref 3.81–5.12)
SODIUM SERPL-SCNC: 140 MMOL/L (ref 135–147)
TRIGL SERPL-MCNC: 143 MG/DL
TSH SERPL DL<=0.05 MIU/L-ACNC: 2.92 UIU/ML (ref 0.45–4.5)
WBC # BLD AUTO: 7.44 THOUSAND/UL (ref 4.31–10.16)

## 2023-12-29 PROCEDURE — 36415 COLL VENOUS BLD VENIPUNCTURE: CPT

## 2023-12-29 PROCEDURE — 85025 COMPLETE CBC W/AUTO DIFF WBC: CPT

## 2023-12-29 PROCEDURE — 84443 ASSAY THYROID STIM HORMONE: CPT

## 2023-12-29 PROCEDURE — 80061 LIPID PANEL: CPT

## 2023-12-29 PROCEDURE — 82306 VITAMIN D 25 HYDROXY: CPT

## 2023-12-29 PROCEDURE — 80053 COMPREHEN METABOLIC PANEL: CPT

## 2024-02-09 ENCOUNTER — CLINICAL SUPPORT (OUTPATIENT)
Dept: FAMILY MEDICINE CLINIC | Facility: CLINIC | Age: 54
End: 2024-02-09

## 2024-02-09 DIAGNOSIS — Z30.42 ENCOUNTER FOR DEPO-PROVERA CONTRACEPTION: Primary | ICD-10-CM

## 2024-02-09 PROCEDURE — 96372 THER/PROPH/DIAG INJ SC/IM: CPT

## 2024-02-09 RX ORDER — MEDROXYPROGESTERONE ACETATE 150 MG/ML
150 INJECTION, SUSPENSION INTRAMUSCULAR ONCE
Status: COMPLETED | OUTPATIENT
Start: 2024-02-09 | End: 2024-02-09

## 2024-02-09 RX ADMIN — MEDROXYPROGESTERONE ACETATE 150 MG: 150 INJECTION, SUSPENSION INTRAMUSCULAR at 08:26

## 2024-03-09 ENCOUNTER — OFFICE VISIT (OUTPATIENT)
Dept: URGENT CARE | Facility: MEDICAL CENTER | Age: 54
End: 2024-03-09
Payer: COMMERCIAL

## 2024-03-09 VITALS
HEART RATE: 70 BPM | WEIGHT: 214 LBS | OXYGEN SATURATION: 99 % | DIASTOLIC BLOOD PRESSURE: 72 MMHG | SYSTOLIC BLOOD PRESSURE: 116 MMHG | RESPIRATION RATE: 18 BRPM | BODY MASS INDEX: 34.39 KG/M2 | HEIGHT: 66 IN | TEMPERATURE: 98 F

## 2024-03-09 DIAGNOSIS — I10 HYPERTENSION, ESSENTIAL: ICD-10-CM

## 2024-03-09 DIAGNOSIS — H10.32 ACUTE CONJUNCTIVITIS OF LEFT EYE, UNSPECIFIED ACUTE CONJUNCTIVITIS TYPE: Primary | ICD-10-CM

## 2024-03-09 PROCEDURE — 99213 OFFICE O/P EST LOW 20 MIN: CPT | Performed by: PHYSICIAN ASSISTANT

## 2024-03-09 RX ORDER — POLYMYXIN B SULFATE AND TRIMETHOPRIM 1; 10000 MG/ML; [USP'U]/ML
1 SOLUTION OPHTHALMIC EVERY 6 HOURS
Qty: 10 ML | Refills: 0 | Status: SHIPPED | OUTPATIENT
Start: 2024-03-09 | End: 2024-03-16

## 2024-03-09 NOTE — PROGRESS NOTES
"  [facility]        NAME: Natacha Serna is a 53 y.o. female  : 1970    MRN: [id number]  DATE: 2024  TIME: 8:38 AM    Assessment and Plan   Acute conjunctivitis of left eye, unspecified acute conjunctivitis type [H10.32]  1. Acute conjunctivitis of left eye, unspecified acute conjunctivitis type  polymyxin b-trimethoprim (POLYTRIM) ophthalmic solution            Patient Instructions     Use cool compresses as needed for comfort  Use Polytrim 1 drop 3x daily into both eyes  Protect eyes from light and wind  Follow-up with PCP if symptoms worsen or persist.       If tests have been performed at [facility], our office will contact you with results if changes need to be made to the care plan discussed with you at the visit.  [facility]    Chief Complaint     Chief Complaint   Patient presents with    Conjunctivitis     Patient states she has left eye crusting, drainage, swelling; states she woke up with it crusted shut; states she has hx of cataract surgery and sprayed antibacterial spray from treatment          History of Present Illness       Beatrice is a 53-year-old female who presents with a 1 day history of left ocular redness and drainage.  Patient reports her symptoms started last evening with some \"ocular itching\" and redness.  She woke earlier this morning with her left eye red and pasted shut.  Patient denies any ocular pain or changes in vision.  She denies the use of contact lenses.    Conjunctivitis   Associated symptoms include eye itching, eye discharge and eye redness. Pertinent negatives include no photophobia.       Review of Systems   Review of Systems   Constitutional: Negative.    HENT: Negative.     Eyes:  Positive for discharge, redness and itching. Negative for photophobia and visual disturbance.   Respiratory: Negative.     Gastrointestinal: Negative.          Current Medications       Current Outpatient Medications:     " polymyxin b-trimethoprim (POLYTRIM) ophthalmic solution, Administer 1 drop into the left eye every 6 (six) hours for 7 days, Disp: 10 mL, Rfl: 0    buPROPion (WELLBUTRIN SR) 150 mg 12 hr tablet, TAKE 1 TABLET BY MOUTH TWICE A DAY, Disp: 180 tablet, Rfl: 1    busPIRone (BUSPAR) 5 mg tablet, TAKE 1 TABLET BY MOUTH TWICE A DAY, Disp: 180 tablet, Rfl: 1    hydrochlorothiazide (HYDRODIURIL) 25 mg tablet, TAKE 1 TABLET BY MOUTH EVERY DAY IN THE MORNING, Disp: 90 tablet, Rfl: 0    levothyroxine 150 mcg tablet, TAKE 1 TABLET BY MOUTH EVERY DAY, Disp: 90 tablet, Rfl: 1    medroxyPROGESTERone (DEPO-PROVERA) 150 mg/mL injection, Inject 1 mL (150 mg total) into a muscle every 3 (three) months, Disp: 1 mL, Rfl: 1    zolpidem (AMBIEN CR) 6.25 MG CR tablet, Take 1 tablet (6.25 mg total) by mouth daily at bedtime as needed for sleep, Disp: 30 tablet, Rfl: 2    Current Allergies     Allergies as of 2024 - Reviewed 2024   Allergen Reaction Noted    Pollen extract  2020    Shrimp extract allergy skin test - food allergy  2020            The following portions of the patient's history were reviewed and updated as appropriate: allergies, current medications, past family history, past medical history, past social history, past surgical history and problem list.     Past Medical History:   Diagnosis Date    Disease of thyroid gland     Hypertension        Past Surgical History:   Procedure Laterality Date    CATARACT EXTRACTION Left 10/2023    CATARACT EXTRACTION Right 2023     SECTION, LOW TRANSVERSE      Last Assessed: 5/15/2015    CHOLECYSTECTOMY      Last Assessed: 5/15/2015     IR UTERINE ARTERY EMBOLIZATION  2019    TOOTH EXTRACTION      Last Assessed: 5/15/2015       Family History   Problem Relation Age of Onset    No Known Problems Mother     No Known Problems Father          Medications have been verified.        Objective   /72   Pulse 70   Temp 98 °F (36.7 °C) (Temporal)   Resp  "18   Ht 5' 6\" (1.676 m)   Wt 97.1 kg (214 lb)   SpO2 99%   BMI 34.54 kg/m²   No LMP recorded. Patient is premenopausal.       Physical Exam     Physical Exam  Constitutional:       General: She is not in acute distress.     Appearance: Normal appearance. She is not ill-appearing.   HENT:      Head: Normocephalic and atraumatic.      Right Ear: Tympanic membrane and ear canal normal.      Left Ear: Tympanic membrane and ear canal normal.      Nose: Nose normal.      Mouth/Throat:      Lips: Pink.      Pharynx: Oropharynx is clear.   Eyes:      Comments: Left conjunctive and sclera injected with watery discharge.  There is some crusting on the lash lines, right with mild conjunctival inflammation but no drainage   Cardiovascular:      Rate and Rhythm: Normal rate and regular rhythm.      Heart sounds: Normal heart sounds, S1 normal and S2 normal. No murmur heard.  Pulmonary:      Effort: Pulmonary effort is normal.      Breath sounds: Normal breath sounds and air entry.   Neurological:      Mental Status: She is alert.                     "

## 2024-03-09 NOTE — PATIENT INSTRUCTIONS
Use cool compresses as needed for comfort  Use Polytrim 1 drop 3x daily into both eyes  Protect eyes from light and wind  Follow-up with PCP if symptoms worsen or persist.

## 2024-03-11 RX ORDER — HYDROCHLOROTHIAZIDE 25 MG/1
TABLET ORAL
Qty: 30 TABLET | Refills: 2 | Status: SHIPPED | OUTPATIENT
Start: 2024-03-11

## 2024-04-23 DIAGNOSIS — Z00.00 HEALTH CARE MAINTENANCE: ICD-10-CM

## 2024-04-24 RX ORDER — MEDROXYPROGESTERONE ACETATE 150 MG/ML
150 INJECTION, SUSPENSION INTRAMUSCULAR
Qty: 1 ML | Refills: 0 | Status: SHIPPED | OUTPATIENT
Start: 2024-04-24

## 2024-05-03 ENCOUNTER — CLINICAL SUPPORT (OUTPATIENT)
Dept: FAMILY MEDICINE CLINIC | Facility: CLINIC | Age: 54
End: 2024-05-03
Payer: COMMERCIAL

## 2024-05-03 DIAGNOSIS — D21.9 LEIOMYOMA: Primary | ICD-10-CM

## 2024-05-03 DIAGNOSIS — Z30.42 ENCOUNTER FOR DEPO-PROVERA CONTRACEPTION: Primary | ICD-10-CM

## 2024-05-03 PROCEDURE — 96372 THER/PROPH/DIAG INJ SC/IM: CPT

## 2024-05-03 RX ORDER — MEDROXYPROGESTERONE ACETATE 150 MG/ML
150 INJECTION, SUSPENSION INTRAMUSCULAR ONCE
Status: CANCELLED | OUTPATIENT
Start: 2024-05-03 | End: 2024-05-03

## 2024-05-03 RX ORDER — MEDROXYPROGESTERONE ACETATE 150 MG/ML
150 INJECTION, SUSPENSION INTRAMUSCULAR ONCE
Status: COMPLETED | OUTPATIENT
Start: 2024-05-03 | End: 2024-05-03

## 2024-05-03 RX ADMIN — MEDROXYPROGESTERONE ACETATE 150 MG: 150 INJECTION, SUSPENSION INTRAMUSCULAR at 13:54

## 2024-05-03 NOTE — PROGRESS NOTES
Patient presents for Depo shot given in the left deltoid. Patient tolerated well. Next Depo shot is July 19- Aug 2

## 2024-05-15 DIAGNOSIS — E03.9 HYPOTHYROIDISM, ADULT: ICD-10-CM

## 2024-05-15 RX ORDER — LEVOTHYROXINE SODIUM 0.15 MG/1
150 TABLET ORAL DAILY
Qty: 90 TABLET | Refills: 0 | Status: SHIPPED | OUTPATIENT
Start: 2024-05-15

## 2024-06-13 ENCOUNTER — PROCEDURE VISIT (OUTPATIENT)
Dept: FAMILY MEDICINE CLINIC | Facility: CLINIC | Age: 54
End: 2024-06-13
Payer: COMMERCIAL

## 2024-06-13 VITALS
DIASTOLIC BLOOD PRESSURE: 84 MMHG | WEIGHT: 200 LBS | SYSTOLIC BLOOD PRESSURE: 126 MMHG | HEIGHT: 66 IN | OXYGEN SATURATION: 97 % | BODY MASS INDEX: 32.14 KG/M2 | HEART RATE: 93 BPM

## 2024-06-13 DIAGNOSIS — Z12.31 ENCOUNTER FOR SCREENING MAMMOGRAM FOR BREAST CANCER: ICD-10-CM

## 2024-06-13 DIAGNOSIS — Z12.4 PAP SMEAR FOR CERVICAL CANCER SCREENING: Primary | ICD-10-CM

## 2024-06-13 PROCEDURE — S0612 ANNUAL GYNECOLOGICAL EXAMINA: HCPCS | Performed by: PHYSICIAN ASSISTANT

## 2024-06-13 PROCEDURE — G0145 SCR C/V CYTO,THINLAYER,RESCR: HCPCS | Performed by: PHYSICIAN ASSISTANT

## 2024-06-13 NOTE — PROGRESS NOTES
Subjective     Natacha Serna is a 53 y.o. female here for a routine exam.  Current complaints: none.  Personal health questionnaire reviewed: not asked.     Gynecologic History  No LMP recorded. Patient is premenopausal.  Contraception: Depo-Provera injections  Last Pap: . Results were: normal  Last mammogram: . Results were: normal    Obstetric History  OB History   No obstetric history on file.         The following portions of the patient's history were reviewed and updated as appropriate: She  has a past medical history of Disease of thyroid gland and Hypertension.  She   Patient Active Problem List    Diagnosis Date Noted   • Corneal scar, left eye 2023   • Cataract of both eyes 2023   • Pre-op examination 2023   • Vitreous floater, left 2023   • Encounter for support and coordination of transition of care 2021   • Insomnia 2021   • Major depressive disorder, single episode, mild with anxious distress (HCC) 2020   • Generalized anxiety disorder 2019   • Transition of care performed with sharing of clinical summary 2019   • Disorder of both eustachian tubes 2019   • Leiomyoma 2019   • Pelvic pain 2019   • Heart murmur 2019   • Seasonal allergic rhinitis due to pollen 2019   • Intramural, submucous, and subserous leiomyoma of uterus 2018   • Hypertriglyceridemia without hypercholesterolemia 2016   • Benign essential hypertension 2016   • Hypothyroidism 2014   • Abnormal mammogram 2014     She  has a past surgical history that includes  section, low transverse; Cholecystectomy; Tooth extraction; IR uterine artery embolization (2019); Cataract extraction (Left, 10/2023); and Cataract extraction (Right, 2023).  Her family history includes No Known Problems in her father and mother.  She  reports that she has never smoked. She has never used smokeless tobacco. She reports that  "she does not currently use alcohol. She reports that she does not use drugs.  Current Outpatient Medications   Medication Sig Dispense Refill   • buPROPion (WELLBUTRIN SR) 150 mg 12 hr tablet TAKE 1 TABLET BY MOUTH TWICE A  tablet 1   • busPIRone (BUSPAR) 5 mg tablet TAKE 1 TABLET BY MOUTH TWICE A  tablet 1   • hydroCHLOROthiazide 25 mg tablet TAKE 1 TABLET BY MOUTH EVERY DAY IN THE MORNING 30 tablet 2   • levothyroxine 150 mcg tablet Take 1 tablet (150 mcg total) by mouth daily 90 tablet 0   • medroxyPROGESTERone (DEPO-PROVERA) 150 mg/mL injection Inject 1 mL (150 mg total) into a muscle every 3 (three) months 1 mL 0   • zolpidem (AMBIEN CR) 6.25 MG CR tablet Take 1 tablet (6.25 mg total) by mouth daily at bedtime as needed for sleep 30 tablet 2     No current facility-administered medications for this visit.     Current Outpatient Medications on File Prior to Visit   Medication Sig   • buPROPion (WELLBUTRIN SR) 150 mg 12 hr tablet TAKE 1 TABLET BY MOUTH TWICE A DAY   • busPIRone (BUSPAR) 5 mg tablet TAKE 1 TABLET BY MOUTH TWICE A DAY   • hydroCHLOROthiazide 25 mg tablet TAKE 1 TABLET BY MOUTH EVERY DAY IN THE MORNING   • levothyroxine 150 mcg tablet Take 1 tablet (150 mcg total) by mouth daily   • medroxyPROGESTERone (DEPO-PROVERA) 150 mg/mL injection Inject 1 mL (150 mg total) into a muscle every 3 (three) months   • zolpidem (AMBIEN CR) 6.25 MG CR tablet Take 1 tablet (6.25 mg total) by mouth daily at bedtime as needed for sleep     No current facility-administered medications on file prior to visit.     She is allergic to pollen extract and shrimp extract allergy skin test - food allergy..    Review of Systems  Pertinent items are noted in HPI.      Objective     /84 (BP Location: Left arm, Patient Position: Sitting, Cuff Size: Standard)   Pulse 93   Ht 5' 6\" (1.676 m)   Wt 90.7 kg (200 lb)   SpO2 97%   BMI 32.28 kg/m²   General appearance: alert and oriented, in no acute " distress  Head: Normocephalic, without obvious abnormality, atraumatic  Neck: no adenopathy, no carotid bruit, no JVD, supple, symmetrical, trachea midline, and thyroid not enlarged, symmetric, no tenderness/mass/nodules  Back: symmetric, no curvature. ROM normal. No CVA tenderness.  Lungs: clear to auscultation bilaterally  Breasts: normal appearance, no masses or tenderness  Heart: regular rate and rhythm, S1, S2 normal, no murmur, click, rub or gallop  Abdomen: soft, non-tender; bowel sounds normal; no masses,  no organomegaly  Pelvic: external genitalia normal, vagina normal without discharge, no cervical motion tenderness, cervix normal in appearance, no adnexal masses or tenderness, rectovaginal septum normal, and uterus enlarged and irregular consistent with known fibroids  Extremities: extremities normal, warm and well-perfused; no cyanosis, clubbing, or edema  Pulses: 2+ and symmetric  Skin: Skin color, texture, turgor normal. No rashes or lesions  Lymph nodes: Cervical, supraclavicular, and axillary nodes normal.  Neurologic: Grossly normal      Assessment     Healthy female exam.      Plan     Mammogram ordered.  Follow up in: 1 year.  Pap done

## 2024-06-16 DIAGNOSIS — I10 HYPERTENSION, ESSENTIAL: ICD-10-CM

## 2024-06-18 RX ORDER — HYDROCHLOROTHIAZIDE 25 MG/1
TABLET ORAL
Qty: 30 TABLET | Refills: 2 | Status: SHIPPED | OUTPATIENT
Start: 2024-06-18

## 2024-06-19 LAB
LAB AP GYN PRIMARY INTERPRETATION: NORMAL
Lab: NORMAL

## 2024-07-01 ENCOUNTER — OFFICE VISIT (OUTPATIENT)
Age: 54
End: 2024-07-01
Payer: COMMERCIAL

## 2024-07-01 VITALS
HEART RATE: 83 BPM | DIASTOLIC BLOOD PRESSURE: 66 MMHG | RESPIRATION RATE: 18 BRPM | WEIGHT: 201 LBS | SYSTOLIC BLOOD PRESSURE: 132 MMHG | TEMPERATURE: 98.9 F | OXYGEN SATURATION: 98 % | BODY MASS INDEX: 32.44 KG/M2

## 2024-07-01 DIAGNOSIS — N30.00 ACUTE CYSTITIS WITHOUT HEMATURIA: Primary | ICD-10-CM

## 2024-07-01 LAB
SL AMB  POCT GLUCOSE, UA: NEGATIVE
SL AMB LEUKOCYTE ESTERASE,UA: ABNORMAL
SL AMB POCT BILIRUBIN,UA: NEGATIVE
SL AMB POCT BLOOD,UA: NEGATIVE
SL AMB POCT CLARITY,UA: ABNORMAL
SL AMB POCT COLOR,UA: YELLOW
SL AMB POCT KETONES,UA: NEGATIVE
SL AMB POCT NITRITE,UA: NEGATIVE
SL AMB POCT PH,UA: 6
SL AMB POCT SPECIFIC GRAVITY,UA: 1.02
SL AMB POCT URINE PROTEIN: NEGATIVE
SL AMB POCT UROBILINOGEN: 0.2

## 2024-07-01 PROCEDURE — G0382 LEV 3 HOSP TYPE B ED VISIT: HCPCS | Performed by: NURSE PRACTITIONER

## 2024-07-01 PROCEDURE — 87186 SC STD MICRODIL/AGAR DIL: CPT | Performed by: NURSE PRACTITIONER

## 2024-07-01 PROCEDURE — S9083 URGENT CARE CENTER GLOBAL: HCPCS | Performed by: NURSE PRACTITIONER

## 2024-07-01 PROCEDURE — 87077 CULTURE AEROBIC IDENTIFY: CPT | Performed by: NURSE PRACTITIONER

## 2024-07-01 PROCEDURE — 81002 URINALYSIS NONAUTO W/O SCOPE: CPT | Performed by: NURSE PRACTITIONER

## 2024-07-01 PROCEDURE — 87086 URINE CULTURE/COLONY COUNT: CPT | Performed by: NURSE PRACTITIONER

## 2024-07-01 RX ORDER — NITROFURANTOIN 25; 75 MG/1; MG/1
100 CAPSULE ORAL 2 TIMES DAILY
Qty: 10 CAPSULE | Refills: 0 | Status: SHIPPED | OUTPATIENT
Start: 2024-07-01 | End: 2024-07-06

## 2024-07-01 NOTE — PATIENT INSTRUCTIONS
Take antibiotic as directed.  Recommend drinking plenty of fluids including water and cranberry juice.  Recommend avoiding caffeine or alcohol.  Empty bladder frequently.  If you develop any high fever, severe back pain, abdominal pain, nausea, vomiting or any concerning symptoms please return proceed ER.  Recommend following up with PCP in 3-5 days

## 2024-07-01 NOTE — PROGRESS NOTES
[facility]        NAME: Natacha Serna is a 53 y.o. female  : 1970    MRN: [id number]  DATE: 2024  TIME: 10:45 AM    Assessment and Plan   Acute cystitis without hematuria [N30.00]  1. Acute cystitis without hematuria  POCT urine dip    Urine culture    nitrofurantoin (MACROBID) 100 mg capsule    Urine culture            Patient Instructions     Patient Instructions   Take antibiotic as directed.  Recommend drinking plenty of fluids including water and cranberry juice.  Recommend avoiding caffeine or alcohol.  Empty bladder frequently.  If you develop any high fever, severe back pain, abdominal pain, nausea, vomiting or any concerning symptoms please return proceed ER.  Recommend following up with PCP in 3-5 days    If tests have been performed at [facility], our office will contact you with results if changes need to be made to the care plan discussed with you at the visit.  [facility]    Chief Complaint     Chief Complaint   Patient presents with    Possible UTI     Pt states she has burning with urination and frequent urination, pelvic and back pain starting 2 days ago          History of Present Illness       Urinary Tract Infection   This is a new problem. Episode onset: 2 days ago. The problem occurs every urination. The problem has been unchanged. The quality of the pain is described as burning. The pain is mild. There has been no fever. Associated symptoms include frequency and urgency. Pertinent negatives include no chills, discharge, flank pain, hematuria, hesitancy, nausea, sweats or vomiting. She has tried nothing for the symptoms. The treatment provided no relief. There is no history of kidney stones or recurrent UTIs.       Review of Systems   Review of Systems   Constitutional:  Negative for chills, diaphoresis, fatigue and fever.   Respiratory:  Negative for cough, chest tightness and shortness of breath.    Cardiovascular:   Negative for chest pain and palpitations.   Gastrointestinal:  Negative for abdominal pain, diarrhea, nausea and vomiting.   Genitourinary:  Positive for dysuria, frequency and urgency. Negative for decreased urine volume, difficulty urinating, flank pain, hematuria, hesitancy, pelvic pain, vaginal bleeding, vaginal discharge and vaginal pain.   Musculoskeletal:  Negative for back pain, joint swelling, myalgias, neck pain and neck stiffness.   Skin:  Negative for rash.   Neurological:  Negative for dizziness, weakness, numbness and headaches.         Current Medications       Current Outpatient Medications:     buPROPion (WELLBUTRIN SR) 150 mg 12 hr tablet, TAKE 1 TABLET BY MOUTH TWICE A DAY, Disp: 180 tablet, Rfl: 1    busPIRone (BUSPAR) 5 mg tablet, TAKE 1 TABLET BY MOUTH TWICE A DAY, Disp: 180 tablet, Rfl: 1    hydroCHLOROthiazide 25 mg tablet, TAKE 1 TABLET BY MOUTH EVERY DAY IN THE MORNING, Disp: 30 tablet, Rfl: 2    levothyroxine 150 mcg tablet, Take 1 tablet (150 mcg total) by mouth daily, Disp: 90 tablet, Rfl: 0    medroxyPROGESTERone (DEPO-PROVERA) 150 mg/mL injection, Inject 1 mL (150 mg total) into a muscle every 3 (three) months, Disp: 1 mL, Rfl: 0    nitrofurantoin (MACROBID) 100 mg capsule, Take 1 capsule (100 mg total) by mouth 2 (two) times a day for 5 days, Disp: 10 capsule, Rfl: 0    zolpidem (AMBIEN CR) 6.25 MG CR tablet, Take 1 tablet (6.25 mg total) by mouth daily at bedtime as needed for sleep (Patient not taking: Reported on 7/1/2024), Disp: 30 tablet, Rfl: 2    Current Allergies     Allergies as of 07/01/2024 - Reviewed 07/01/2024   Allergen Reaction Noted    Pollen extract  01/22/2020    Shrimp extract allergy skin test - food allergy  01/22/2020            The following portions of the patient's history were reviewed and updated as appropriate: allergies, current medications, past family history, past medical history, past social history, past surgical history and problem list.     Past  Medical History:   Diagnosis Date    Disease of thyroid gland     Hypertension        Past Surgical History:   Procedure Laterality Date    CATARACT EXTRACTION Left 10/2023    CATARACT EXTRACTION Right 2023     SECTION, LOW TRANSVERSE      Last Assessed: 5/15/2015    CHOLECYSTECTOMY      Last Assessed: 5/15/2015     IR UTERINE ARTERY EMBOLIZATION  2019    TOOTH EXTRACTION      Last Assessed: 5/15/2015       Family History   Problem Relation Age of Onset    No Known Problems Mother     No Known Problems Father          Medications have been verified.        Objective   /66   Pulse 83   Temp 98.9 °F (37.2 °C)   Resp 18   Wt 91.2 kg (201 lb)   SpO2 98%   BMI 32.44 kg/m²   No LMP recorded. Patient is premenopausal.       Physical Exam     Physical Exam  Constitutional:       General: She is not in acute distress.     Appearance: Normal appearance. She is well-developed. She is not diaphoretic.   Cardiovascular:      Rate and Rhythm: Normal rate and regular rhythm.      Heart sounds: Normal heart sounds.   Pulmonary:      Effort: Pulmonary effort is normal.      Breath sounds: Normal breath sounds.   Abdominal:      General: Bowel sounds are normal. There is no distension.      Palpations: Abdomen is soft. Abdomen is not rigid. There is no mass.      Tenderness: There is no abdominal tenderness. There is no CVA tenderness, right CVA tenderness, left CVA tenderness, guarding or rebound. Negative signs include Page's sign and McBurney's sign.   Skin:     General: Skin is warm and dry.   Neurological:      Mental Status: She is alert and oriented to person, place, and time.

## 2024-07-03 ENCOUNTER — TELEPHONE (OUTPATIENT)
Age: 54
End: 2024-07-03

## 2024-07-03 DIAGNOSIS — N30.00 ACUTE CYSTITIS WITHOUT HEMATURIA: Primary | ICD-10-CM

## 2024-07-03 LAB — BACTERIA UR CULT: ABNORMAL

## 2024-07-03 RX ORDER — SULFAMETHOXAZOLE AND TRIMETHOPRIM 800; 160 MG/1; MG/1
1 TABLET ORAL DAILY
Qty: 5 TABLET | Refills: 0 | Status: SHIPPED | OUTPATIENT
Start: 2024-07-03 | End: 2024-07-08

## 2024-07-03 NOTE — TELEPHONE ENCOUNTER
Patient was called today and informed that her current antibiotic for her UTI is ineffective for the bacteria found in culture.  Patient was informed that I will be sending a new antibiotic, Bactrim, to her pharmacy of choice.  Patient expressed verbal understanding to these instructions.

## 2024-07-05 DIAGNOSIS — Z00.00 HEALTH CARE MAINTENANCE: ICD-10-CM

## 2024-07-09 RX ORDER — MEDROXYPROGESTERONE ACETATE 150 MG/ML
150 INJECTION, SUSPENSION INTRAMUSCULAR
Qty: 1 ML | Refills: 0 | Status: SHIPPED | OUTPATIENT
Start: 2024-07-09

## 2024-07-11 DIAGNOSIS — I10 HYPERTENSION, ESSENTIAL: ICD-10-CM

## 2024-07-11 RX ORDER — HYDROCHLOROTHIAZIDE 25 MG/1
TABLET ORAL
Qty: 90 TABLET | Refills: 1 | Status: SHIPPED | OUTPATIENT
Start: 2024-07-11

## 2024-07-16 ENCOUNTER — OFFICE VISIT (OUTPATIENT)
Age: 54
End: 2024-07-16
Payer: COMMERCIAL

## 2024-07-16 VITALS
OXYGEN SATURATION: 99 % | HEART RATE: 85 BPM | DIASTOLIC BLOOD PRESSURE: 70 MMHG | SYSTOLIC BLOOD PRESSURE: 127 MMHG | TEMPERATURE: 98.3 F | WEIGHT: 201.4 LBS | BODY MASS INDEX: 32.51 KG/M2 | RESPIRATION RATE: 18 BRPM

## 2024-07-16 DIAGNOSIS — N39.0 URINARY TRACT INFECTION WITHOUT HEMATURIA, SITE UNSPECIFIED: Primary | ICD-10-CM

## 2024-07-16 LAB
SL AMB  POCT GLUCOSE, UA: ABNORMAL
SL AMB LEUKOCYTE ESTERASE,UA: ABNORMAL
SL AMB POCT BILIRUBIN,UA: ABNORMAL
SL AMB POCT BLOOD,UA: ABNORMAL
SL AMB POCT CLARITY,UA: ABNORMAL
SL AMB POCT COLOR,UA: ABNORMAL
SL AMB POCT KETONES,UA: ABNORMAL
SL AMB POCT NITRITE,UA: ABNORMAL
SL AMB POCT PH,UA: 5
SL AMB POCT SPECIFIC GRAVITY,UA: 1.01
SL AMB POCT URINE HCG: ABNORMAL
SL AMB POCT URINE PROTEIN: 30
SL AMB POCT UROBILINOGEN: 0.2

## 2024-07-16 PROCEDURE — 87086 URINE CULTURE/COLONY COUNT: CPT | Performed by: PHYSICIAN ASSISTANT

## 2024-07-16 PROCEDURE — 81002 URINALYSIS NONAUTO W/O SCOPE: CPT | Performed by: PHYSICIAN ASSISTANT

## 2024-07-16 PROCEDURE — S9083 URGENT CARE CENTER GLOBAL: HCPCS | Performed by: PHYSICIAN ASSISTANT

## 2024-07-16 PROCEDURE — 81025 URINE PREGNANCY TEST: CPT | Performed by: PHYSICIAN ASSISTANT

## 2024-07-16 PROCEDURE — G0382 LEV 3 HOSP TYPE B ED VISIT: HCPCS | Performed by: PHYSICIAN ASSISTANT

## 2024-07-16 RX ORDER — CIPROFLOXACIN 250 MG/1
250 TABLET, FILM COATED ORAL EVERY 12 HOURS SCHEDULED
Qty: 6 TABLET | Refills: 0 | Status: SHIPPED | OUTPATIENT
Start: 2024-07-16 | End: 2024-07-19

## 2024-07-16 NOTE — PROGRESS NOTES
[facility]        NAME: Natacha Serna is a 53 y.o. female  : 1970    MRN: [id number]  DATE: 2024  TIME: 9:10 AM    Assessment and Plan   Urinary tract infection without hematuria, site unspecified [N39.0]  1. Urinary tract infection without hematuria, site unspecified  POCT urine dip    Urine culture    POCT urine HCG    ciprofloxacin (CIPRO) 250 mg tablet          Patient still with symptoms of UTI UA with small amount of white blood cells only.  Will trial 3 days of Cipro based on the most recent culture and send off a new culture if this changes her management will let her know.  If she still has symptoms despite 3 days of Cipro recommend that she follow-up with her PCP    The patient verbalized understanding of exam findings and treatment plan.   We engaged in the shared decision-making process and treatment options were   discussed at length with the patient.  All questions, concerns and  complaints were answered and addressed to the patient's satisfaction.    Patient Instructions   There are no Patient Instructions on file for this visit.    Follow up with PCP in 3-5 days.  Proceed to  ER if symptoms worsen.    If tests are performed, our office will contact you with results only if   changes need to made to the care plan discussed with you at the visit.   [facility]     Chief Complaint     Chief Complaint   Patient presents with   • Possible Pregnancy     Symptoms started 2 weeks ago, C/o frequent urination and burning when urinating.          History of Present Illness       HPI  Pt comes in for dysuria, frequency - was on macrobid and then bactrim for 5 days. No fever or chills. No blood in urine. Suprapubic tenderness. No flank pain.    Review of Systems   Review of Systems  All other related systems reviewed and are negative except as noted in HPI    Current Medications       Current Outpatient Medications:   •  ciprofloxacin (CIPRO)  250 mg tablet, Take 1 tablet (250 mg total) by mouth every 12 (twelve) hours for 3 days, Disp: 6 tablet, Rfl: 0  •  buPROPion (WELLBUTRIN SR) 150 mg 12 hr tablet, TAKE 1 TABLET BY MOUTH TWICE A DAY, Disp: 180 tablet, Rfl: 1  •  busPIRone (BUSPAR) 5 mg tablet, TAKE 1 TABLET BY MOUTH TWICE A DAY, Disp: 180 tablet, Rfl: 1  •  hydroCHLOROthiazide 25 mg tablet, TAKE 1 TABLET BY MOUTH EVERY DAY IN THE MORNING, Disp: 90 tablet, Rfl: 1  •  levothyroxine 150 mcg tablet, Take 1 tablet (150 mcg total) by mouth daily, Disp: 90 tablet, Rfl: 0  •  medroxyPROGESTERone (DEPO-PROVERA) 150 mg/mL injection, INJECT 1 ML (150 MG TOTAL) INTO A MUSCLE EVERY 3 (THREE) MONTHS, Disp: 1 mL, Rfl: 0  •  zolpidem (AMBIEN CR) 6.25 MG CR tablet, Take 1 tablet (6.25 mg total) by mouth daily at bedtime as needed for sleep (Patient not taking: Reported on 2024), Disp: 30 tablet, Rfl: 2    Current Allergies     Allergies as of 2024 - Reviewed 2024   Allergen Reaction Noted   • Pollen extract  2020   • Shrimp extract allergy skin test - food allergy  2020            The following portions of the patient's history were reviewed and updated as appropriate: allergies, current medications, past family history, past medical history, past social history, past surgical history and problem list.     Past Medical History:   Diagnosis Date   • Disease of thyroid gland    • Hypertension        Past Surgical History:   Procedure Laterality Date   • CATARACT EXTRACTION Left 10/2023   • CATARACT EXTRACTION Right 2023   •  SECTION, LOW TRANSVERSE      Last Assessed: 5/15/2015   • CHOLECYSTECTOMY      Last Assessed: 5/15/2015    • IR UTERINE ARTERY EMBOLIZATION  2019   • TOOTH EXTRACTION      Last Assessed: 5/15/2015       Family History   Problem Relation Age of Onset   • No Known Problems Mother    • No Known Problems Father          Medications have been verified.        Objective   /70   Pulse 85   Temp 98.3 °F  "(36.8 °C)   Resp 18   Wt 91.4 kg (201 lb 6.4 oz)   SpO2 99%   BMI 32.51 kg/m²   No LMP recorded. Patient is premenopausal.       Physical Exam     Physical Exam  Constitutional:       General: She is not in acute distress.     Appearance: She is well-developed.   HENT:      Head: Normocephalic and atraumatic.   Eyes:      General: No scleral icterus.     Conjunctiva/sclera: Conjunctivae normal.   Neck:      Trachea: No tracheal deviation.   Pulmonary:      Effort: Pulmonary effort is normal. No respiratory distress.      Breath sounds: No stridor.   Abdominal:      Tenderness: There is right CVA tenderness and left CVA tenderness.   Musculoskeletal:      Cervical back: Normal range of motion.   Skin:     General: Skin is warm and dry.      Findings: No erythema.   Neurological:      Mental Status: She is alert and oriented to person, place, and time.   Psychiatric:         Behavior: Behavior normal.         Ortho Exam        Procedures  No Procedures performed today        Note: Portions of this record may have been created with voice recognition software. Occasional wrong word or \"sound a like\" substitutions may have occurred due to the inherent limitations of voice recognition software. Please read the chart carefully and recognize, using context, where substitutions have occurred.*      "

## 2024-07-17 LAB — BACTERIA UR CULT: NORMAL

## 2024-07-19 DIAGNOSIS — F41.1 GENERALIZED ANXIETY DISORDER: ICD-10-CM

## 2024-07-20 RX ORDER — BUSPIRONE HYDROCHLORIDE 5 MG/1
TABLET ORAL
Qty: 180 TABLET | Refills: 1 | Status: SHIPPED | OUTPATIENT
Start: 2024-07-20

## 2024-07-22 ENCOUNTER — CLINICAL SUPPORT (OUTPATIENT)
Dept: FAMILY MEDICINE CLINIC | Facility: CLINIC | Age: 54
End: 2024-07-22
Payer: COMMERCIAL

## 2024-07-22 DIAGNOSIS — Z30.42 ENCOUNTER FOR DEPO-PROVERA CONTRACEPTION: Primary | ICD-10-CM

## 2024-07-22 PROCEDURE — 96372 THER/PROPH/DIAG INJ SC/IM: CPT

## 2024-07-22 RX ORDER — MEDROXYPROGESTERONE ACETATE 150 MG/ML
150 INJECTION, SUSPENSION INTRAMUSCULAR ONCE
Status: DISCONTINUED | OUTPATIENT
Start: 2024-07-22 | End: 2024-07-22

## 2024-07-22 RX ORDER — MEDROXYPROGESTERONE ACETATE 150 MG/ML
150 INJECTION, SUSPENSION INTRAMUSCULAR ONCE
Status: COMPLETED | OUTPATIENT
Start: 2024-07-22 | End: 2024-07-22

## 2024-07-22 RX ADMIN — MEDROXYPROGESTERONE ACETATE 150 MG: 150 INJECTION, SUSPENSION INTRAMUSCULAR at 09:50

## 2024-08-15 DIAGNOSIS — E03.9 HYPOTHYROIDISM, ADULT: ICD-10-CM

## 2024-08-15 RX ORDER — LEVOTHYROXINE SODIUM 150 UG/1
150 TABLET ORAL DAILY
Qty: 30 TABLET | Refills: 5 | Status: SHIPPED | OUTPATIENT
Start: 2024-08-15

## 2024-09-10 DIAGNOSIS — F32.0 MAJOR DEPRESSIVE DISORDER, SINGLE EPISODE, MILD WITH ANXIOUS DISTRESS (HCC): ICD-10-CM

## 2024-09-10 RX ORDER — BUPROPION HYDROCHLORIDE 150 MG/1
150 TABLET, EXTENDED RELEASE ORAL 2 TIMES DAILY
Qty: 180 TABLET | Refills: 0 | Status: SHIPPED | OUTPATIENT
Start: 2024-09-10

## 2024-09-29 DIAGNOSIS — Z00.00 HEALTH CARE MAINTENANCE: ICD-10-CM

## 2024-09-30 RX ORDER — MEDROXYPROGESTERONE ACETATE 150 MG/ML
150 INJECTION, SUSPENSION INTRAMUSCULAR
Qty: 1 ML | Refills: 0 | Status: SHIPPED | OUTPATIENT
Start: 2024-09-30

## 2024-10-14 ENCOUNTER — CLINICAL SUPPORT (OUTPATIENT)
Dept: FAMILY MEDICINE CLINIC | Facility: CLINIC | Age: 54
End: 2024-10-14
Payer: COMMERCIAL

## 2024-10-14 DIAGNOSIS — Z30.42 ENCOUNTER FOR DEPO-PROVERA CONTRACEPTION: Primary | ICD-10-CM

## 2024-10-14 PROCEDURE — 96372 THER/PROPH/DIAG INJ SC/IM: CPT

## 2024-10-14 RX ORDER — MEDROXYPROGESTERONE ACETATE 150 MG/ML
150 INJECTION, SUSPENSION INTRAMUSCULAR ONCE
Status: COMPLETED | OUTPATIENT
Start: 2024-10-14 | End: 2024-10-14

## 2024-10-14 RX ADMIN — MEDROXYPROGESTERONE ACETATE 150 MG: 150 INJECTION, SUSPENSION INTRAMUSCULAR at 09:45

## 2024-11-18 DIAGNOSIS — F32.0 MAJOR DEPRESSIVE DISORDER, SINGLE EPISODE, MILD WITH ANXIOUS DISTRESS (HCC): ICD-10-CM

## 2024-11-19 RX ORDER — BUPROPION HYDROCHLORIDE 150 MG/1
150 TABLET, EXTENDED RELEASE ORAL 2 TIMES DAILY
Qty: 60 TABLET | Refills: 0 | Status: SHIPPED | OUTPATIENT
Start: 2024-11-19

## 2024-12-17 DIAGNOSIS — Z00.00 HEALTH CARE MAINTENANCE: ICD-10-CM

## 2024-12-17 RX ORDER — MEDROXYPROGESTERONE ACETATE 150 MG/ML
150 INJECTION, SUSPENSION INTRAMUSCULAR
Qty: 1 ML | Refills: 3 | Status: SHIPPED | OUTPATIENT
Start: 2024-12-17

## 2025-01-08 ENCOUNTER — OFFICE VISIT (OUTPATIENT)
Dept: FAMILY MEDICINE CLINIC | Facility: CLINIC | Age: 55
End: 2025-01-08
Payer: COMMERCIAL

## 2025-01-08 VITALS
HEART RATE: 105 BPM | BODY MASS INDEX: 31.66 KG/M2 | TEMPERATURE: 98.5 F | OXYGEN SATURATION: 97 % | DIASTOLIC BLOOD PRESSURE: 80 MMHG | HEIGHT: 66 IN | WEIGHT: 197 LBS | SYSTOLIC BLOOD PRESSURE: 126 MMHG

## 2025-01-08 DIAGNOSIS — F32.0 MAJOR DEPRESSIVE DISORDER, SINGLE EPISODE, MILD WITH ANXIOUS DISTRESS (HCC): ICD-10-CM

## 2025-01-08 DIAGNOSIS — E78.1 HYPERTRIGLYCERIDEMIA WITHOUT HYPERCHOLESTEROLEMIA: ICD-10-CM

## 2025-01-08 DIAGNOSIS — R10.2 PELVIC PAIN: ICD-10-CM

## 2025-01-08 DIAGNOSIS — Z12.11 SCREENING FOR COLON CANCER: ICD-10-CM

## 2025-01-08 DIAGNOSIS — D25.1 INTRAMURAL, SUBMUCOUS, AND SUBSEROUS LEIOMYOMA OF UTERUS: ICD-10-CM

## 2025-01-08 DIAGNOSIS — E03.9 ACQUIRED HYPOTHYROIDISM: ICD-10-CM

## 2025-01-08 DIAGNOSIS — D25.0 INTRAMURAL, SUBMUCOUS, AND SUBSEROUS LEIOMYOMA OF UTERUS: ICD-10-CM

## 2025-01-08 DIAGNOSIS — Z00.00 ANNUAL PHYSICAL EXAM: Primary | ICD-10-CM

## 2025-01-08 DIAGNOSIS — D25.2 INTRAMURAL, SUBMUCOUS, AND SUBSEROUS LEIOMYOMA OF UTERUS: ICD-10-CM

## 2025-01-08 DIAGNOSIS — I10 BENIGN ESSENTIAL HYPERTENSION: ICD-10-CM

## 2025-01-08 DIAGNOSIS — Z12.31 ENCOUNTER FOR SCREENING MAMMOGRAM FOR BREAST CANCER: ICD-10-CM

## 2025-01-08 PROCEDURE — 96372 THER/PROPH/DIAG INJ SC/IM: CPT

## 2025-01-08 PROCEDURE — 99396 PREV VISIT EST AGE 40-64: CPT | Performed by: PHYSICIAN ASSISTANT

## 2025-01-08 RX ORDER — MEDROXYPROGESTERONE ACETATE 150 MG/ML
150 INJECTION, SUSPENSION INTRAMUSCULAR ONCE
Status: DISCONTINUED | OUTPATIENT
Start: 2025-01-08 | End: 2025-01-08

## 2025-01-08 RX ADMIN — MEDROXYPROGESTERONE ACETATE 150 MG: 150 INJECTION, SUSPENSION INTRAMUSCULAR at 08:44

## 2025-01-08 NOTE — ASSESSMENT & PLAN NOTE
Orders:  •  CBC and differential; Future  •  Comprehensive metabolic panel; Future  •  Lipid Panel with Direct LDL reflex; Future

## 2025-01-08 NOTE — PATIENT INSTRUCTIONS
"Patient Education     Routine physical for adults   The Basics   Written by the doctors and editors at [facility]   What is a physical? -- A physical is a routine visit, or \"check-up,\" with your doctor. You might also hear it called a \"wellness visit\" or \"preventive visit.\"  During each visit, the doctor will:   Ask about your physical and mental health   Ask about your habits, behaviors, and lifestyle   Do an exam   Give you vaccines if needed   Talk to you about any medicines you take   Give advice about your health   Answer your questions  Getting regular check-ups is an important part of taking care of your health. It can help your doctor find and treat any problems you have. But it's also important for preventing health problems.  A routine physical is different from a \"sick visit.\" A sick visit is when you see a doctor because of a health concern or problem. Since physicals are scheduled ahead of time, you can think about what you want to ask the doctor.  How often should I get a physical? -- It depends on your age and health. In general, for people age 21 years and older:   If you are younger than 50 years, you might be able to get a physical every 3 years.   If you are 50 years or older, your doctor might recommend a physical every year.  If you have an ongoing health condition, like diabetes or high blood pressure, your doctor will probably want to see you more often.  What happens during a physical? -- In general, each visit will include:   Physical exam - The doctor or nurse will check your height, weight, heart rate, and blood pressure. They will also look at your eyes and ears. They will ask about how you are feeling and whether you have any symptoms that bother you.   Medicines - It's a good idea to bring a list of all the medicines you take to each doctor visit. Your doctor will talk to you about your medicines and answer any questions. Tell them if you are having any side effects that bother you. You " "should also tell them if you are having trouble paying for any of your medicines.   Habits and behaviors - This includes:   Your diet   Your exercise habits   Whether you smoke, drink alcohol, or use drugs   Whether you are sexually active   Whether you feel safe at home  Your doctor will talk to you about things you can do to improve your health and lower your risk of health problems. They will also offer help and support. For example, if you want to quit smoking, they can give you advice and might prescribe medicines. If you want to improve your diet or get more physical activity, they can help you with this, too.   Lab tests, if needed - The tests you get will depend on your age and situation. For example, your doctor might want to check your:   Cholesterol   Blood sugar   Iron level   Vaccines - The recommended vaccines will depend on your age, health, and what vaccines you already had. Vaccines are very important because they can prevent certain serious or deadly infections.   Discussion of screening - \"Screening\" means checking for diseases or other health problems before they cause symptoms. Your doctor can recommend screening based on your age, risk, and preferences. This might include tests to check for:   Cancer, such as breast, prostate, cervical, ovarian, colorectal, prostate, lung, or skin cancer   Sexually transmitted infections, such as chlamydia and gonorrhea   Mental health conditions like depression and anxiety  Your doctor will talk to you about the different types of screening tests. They can help you decide which screenings to have. They can also explain what the results might mean.   Answering questions - The physical is a good time to ask the doctor or nurse questions about your health. If needed, they can refer you to other doctors or specialists, too.  Adults older than 65 years often need other care, too. As you get older, your doctor will talk to you about:   How to prevent falling at " home   Hearing or vision tests   Memory testing   How to take your medicines safely   Making sure that you have the help and support you need at home  All topics are updated as new evidence becomes available and our peer review process is complete.  This topic retrieved from [employer] on: May 02, 2024.  Topic 514946 Version 1.0  Release: 32.4.3 - C32.122  © 2024 UpToDate, Inc. and/or its affiliates. All rights reserved.  Consumer Information Use and Disclaimer   Disclaimer: This generalized information is a limited summary of diagnosis, treatment, and/or medication information. It is not meant to be comprehensive and should be used as a tool to help the user understand and/or assess potential diagnostic and treatment options. It does NOT include all information about conditions, treatments, medications, side effects, or risks that may apply to a specific patient. It is not intended to be medical advice or a substitute for the medical advice, diagnosis, or treatment of a health care provider based on the health care provider's examination and assessment of a patient's specific and unique circumstances. Patients must speak with a health care provider for complete information about their health, medical questions, and treatment options, including any risks or benefits regarding use of medications. This information does not endorse any treatments or medications as safe, effective, or approved for treating a specific patient. UpToDate, Inc. and its affiliates disclaim any warranty or liability relating to this information or the use thereof.The use of this information is governed by the Terms of Use, available at https://www.w[employer]ters[employer] Groupuwer.com/en/know/clinical-effectiveness-terms. 2024© UpToDate, Inc. and its affiliates and/or licensors. All rights reserved.  Copyright   © 2024 UpToDate, Inc. and/or its affiliates. All rights reserved.

## 2025-01-08 NOTE — PROGRESS NOTES
Adult Annual Physical  Name: Natacha Serna      : 1970      MRN: [id number]  Encounter Provider: Valeria Ni PA-C  Encounter Date: 2025   Encounter department: [facility] [address]    Assessment & Plan  Annual physical exam  Healthy female       Intramural, submucous, and subserous leiomyoma of uterus         Pelvic pain         Acquired hypothyroidism    Orders:  •  TSH, 3rd generation with Free T4 reflex; Future    Benign essential hypertension    Orders:  •  CBC and differential; Future  •  Comprehensive metabolic panel; Future    Hypertriglyceridemia without hypercholesterolemia    Orders:  •  CBC and differential; Future  •  Comprehensive metabolic panel; Future  •  Lipid Panel with Direct LDL reflex; Future    Screening for colon cancer    Orders:  •  Cologuard    Encounter for screening mammogram for breast cancer    Orders:  •  Mammo screening bilateral w 3d and cad; Future    Immunizations and preventive care screenings were discussed with patient today. Appropriate education was printed on patient's after visit summary.    Counseling:  Dental Health: discussed importance of regular tooth brushing, flossing, and dental visits.  Injury prevention: discussed safety/seat belts, safety helmets, smoke detectors, carbon monoxide detectors, and smoking near bedding or upholstery.  Exercise: the importance of regular exercise/physical activity was discussed. Recommend exercise 3-5 times per week for at least 30 minutes.          History of Present Illness     Adult Annual Physical:  Patient presents for annual physical.     Diet and Physical Activity:  - Diet/Nutrition: well balanced diet.  - Exercise: walking and 3-4 times a week on average.    Depression Screening:    - PHQ-9 Score: 0    General Health:  - Sleep: sleeps poorly.  - Hearing: normal hearing bilateral ears.  - Vision: goes for regular eye exams.  - Dental: regular dental  visits.    /GYN Health:  - Follows with GYN: yes.   - Menopause: perimenopausal.   - Contraception: injectable contraception.      Review of Systems   Constitutional:  Negative for appetite change, fatigue, fever and unexpected weight change.   HENT:  Negative for dental problem, ear pain, hearing loss, nosebleeds, rhinorrhea, trouble swallowing and voice change.    Eyes:  Negative for photophobia, pain, discharge and visual disturbance.   Respiratory:  Negative for cough, chest tightness, shortness of breath and wheezing.    Cardiovascular:  Negative for chest pain and palpitations.   Gastrointestinal:  Negative for abdominal pain, blood in stool, constipation, diarrhea, nausea, rectal pain and vomiting.   Endocrine: Negative for cold intolerance, polydipsia, polyphagia and polyuria.   Genitourinary:  Negative for decreased urine volume, difficulty urinating, dysuria, enuresis, frequency, hematuria and urgency.   Musculoskeletal:  Negative for arthralgias, back pain, gait problem, joint swelling, myalgias, neck pain and neck stiffness.   Skin:  Negative for color change and rash.   Allergic/Immunologic: Negative for environmental allergies, food allergies and immunocompromised state.   Neurological:  Negative for dizziness, seizures, speech difficulty, light-headedness and headaches.   Hematological:  Negative for adenopathy. Does not bruise/bleed easily.   Psychiatric/Behavioral:  Negative for behavioral problems, confusion, decreased concentration, self-injury and sleep disturbance. The patient is not nervous/anxious and is not hyperactive.      Current Outpatient Medications on File Prior to Visit   Medication Sig Dispense Refill   • buPROPion (WELLBUTRIN SR) 150 mg 12 hr tablet TAKE 1 TABLET BY MOUTH TWICE A DAY 60 tablet 0   • busPIRone (BUSPAR) 5 mg tablet TAKE 1 TABLET BY MOUTH TWICE A  tablet 1   • hydroCHLOROthiazide 25 mg tablet TAKE 1 TABLET BY MOUTH EVERY DAY IN THE MORNING 90 tablet 1   •  "levothyroxine 150 mcg tablet TAKE 1 TABLET BY MOUTH EVERY DAY 30 tablet 5   • medroxyPROGESTERone (DEPO-PROVERA) 150 mg/mL injection INJECT 1 ML (150 MG TOTAL) INTO A MUSCLE EVERY 3 (THREE) MONTHS 1 mL 3   • [DISCONTINUED] zolpidem (AMBIEN CR) 6.25 MG CR tablet Take 1 tablet (6.25 mg total) by mouth daily at bedtime as needed for sleep (Patient not taking: Reported on 1/8/2025) 30 tablet 2     No current facility-administered medications on file prior to visit.      Social History     Tobacco Use   • Smoking status: Never   • Smokeless tobacco: Never   Vaping Use   • Vaping status: Never Used   Substance and Sexual Activity   • Alcohol use: Not Currently   • Drug use: Never   • Sexual activity: Yes     Partners: Male     Birth control/protection: Injection     Comment: Depo injection.       Objective   /80 (Patient Position: Sitting, Cuff Size: Standard)   Pulse 105   Temp 98.5 °F (36.9 °C)   Ht 5' 6\" (1.676 m)   Wt 89.4 kg (197 lb)   SpO2 97%   BMI 31.80 kg/m²     Physical Exam  Vitals and nursing note reviewed.   Constitutional:       Appearance: Normal appearance. She is normal weight.   HENT:      Head: Normocephalic and atraumatic.      Right Ear: Tympanic membrane, ear canal and external ear normal.      Left Ear: Tympanic membrane, ear canal and external ear normal.      Nose: Nose normal.      Mouth/Throat:      Mouth: Mucous membranes are moist.      Pharynx: Oropharynx is clear.   Eyes:      Extraocular Movements: Extraocular movements intact.      Conjunctiva/sclera: Conjunctivae normal.   Neck:      Thyroid: No thyromegaly.      Vascular: No carotid bruit.   Cardiovascular:      Rate and Rhythm: Normal rate and regular rhythm.      Pulses: Normal pulses.      Heart sounds: Normal heart sounds.   Pulmonary:      Effort: Pulmonary effort is normal.      Breath sounds: Normal breath sounds.   Abdominal:      General: Abdomen is flat. Bowel sounds are normal.      Palpations: Abdomen is soft. " There is no hepatomegaly, splenomegaly or mass.      Tenderness: There is no abdominal tenderness. There is no right CVA tenderness or left CVA tenderness.   Musculoskeletal:         General: Normal range of motion.      Cervical back: Normal range of motion and neck supple.      Right lower leg: No edema.      Left lower leg: No edema.   Lymphadenopathy:      Cervical: No cervical adenopathy.   Skin:     General: Skin is warm and dry.   Neurological:      General: No focal deficit present.      Mental Status: She is alert and oriented to person, place, and time.   Psychiatric:         Mood and Affect: Mood normal.         Behavior: Behavior normal.         Thought Content: Thought content normal.         Judgment: Judgment normal.

## 2025-01-09 RX ORDER — BUPROPION HYDROCHLORIDE 150 MG/1
150 TABLET, EXTENDED RELEASE ORAL 2 TIMES DAILY
Qty: 180 TABLET | Refills: 1 | Status: SHIPPED | OUTPATIENT
Start: 2025-01-09

## 2025-01-23 ENCOUNTER — APPOINTMENT (OUTPATIENT)
Dept: LAB | Facility: HOSPITAL | Age: 55
End: 2025-01-23
Payer: COMMERCIAL

## 2025-01-23 DIAGNOSIS — E78.1 HYPERTRIGLYCERIDEMIA WITHOUT HYPERCHOLESTEROLEMIA: ICD-10-CM

## 2025-01-23 DIAGNOSIS — E03.9 ACQUIRED HYPOTHYROIDISM: ICD-10-CM

## 2025-01-23 DIAGNOSIS — I10 BENIGN ESSENTIAL HYPERTENSION: ICD-10-CM

## 2025-01-23 LAB
ALBUMIN SERPL BCG-MCNC: 4.7 G/DL (ref 3.5–5)
ALP SERPL-CCNC: 43 U/L (ref 34–104)
ALT SERPL W P-5'-P-CCNC: 27 U/L (ref 7–52)
ANION GAP SERPL CALCULATED.3IONS-SCNC: 7 MMOL/L (ref 4–13)
AST SERPL W P-5'-P-CCNC: 19 U/L (ref 13–39)
BASOPHILS # BLD AUTO: 0.06 THOUSANDS/ΜL (ref 0–0.1)
BASOPHILS NFR BLD AUTO: 1 % (ref 0–1)
BILIRUB SERPL-MCNC: 1.29 MG/DL (ref 0.2–1)
BUN SERPL-MCNC: 17 MG/DL (ref 5–25)
CALCIUM SERPL-MCNC: 9.6 MG/DL (ref 8.4–10.2)
CHLORIDE SERPL-SCNC: 104 MMOL/L (ref 96–108)
CHOLEST SERPL-MCNC: 188 MG/DL (ref ?–200)
CO2 SERPL-SCNC: 28 MMOL/L (ref 21–32)
CREAT SERPL-MCNC: 0.98 MG/DL (ref 0.6–1.3)
EOSINOPHIL # BLD AUTO: 0.22 THOUSAND/ΜL (ref 0–0.61)
EOSINOPHIL NFR BLD AUTO: 3 % (ref 0–6)
ERYTHROCYTE [DISTWIDTH] IN BLOOD BY AUTOMATED COUNT: 13.2 % (ref 11.6–15.1)
GFR SERPL CREATININE-BSD FRML MDRD: 66 ML/MIN/1.73SQ M
GLUCOSE P FAST SERPL-MCNC: 99 MG/DL (ref 65–99)
HCT VFR BLD AUTO: 45 % (ref 34.8–46.1)
HDLC SERPL-MCNC: 43 MG/DL
HGB BLD-MCNC: 15.4 G/DL (ref 11.5–15.4)
IMM GRANULOCYTES # BLD AUTO: 0.03 THOUSAND/UL (ref 0–0.2)
IMM GRANULOCYTES NFR BLD AUTO: 0 % (ref 0–2)
LDLC SERPL CALC-MCNC: 117 MG/DL (ref 0–100)
LYMPHOCYTES # BLD AUTO: 3.01 THOUSANDS/ΜL (ref 0.6–4.47)
LYMPHOCYTES NFR BLD AUTO: 35 % (ref 14–44)
MCH RBC QN AUTO: 31 PG (ref 26.8–34.3)
MCHC RBC AUTO-ENTMCNC: 34.2 G/DL (ref 31.4–37.4)
MCV RBC AUTO: 91 FL (ref 82–98)
MONOCYTES # BLD AUTO: 0.56 THOUSAND/ΜL (ref 0.17–1.22)
MONOCYTES NFR BLD AUTO: 6 % (ref 4–12)
NEUTROPHILS # BLD AUTO: 4.81 THOUSANDS/ΜL (ref 1.85–7.62)
NEUTS SEG NFR BLD AUTO: 55 % (ref 43–75)
NRBC BLD AUTO-RTO: 0 /100 WBCS
PLATELET # BLD AUTO: 221 THOUSANDS/UL (ref 149–390)
PMV BLD AUTO: 10.7 FL (ref 8.9–12.7)
POTASSIUM SERPL-SCNC: 3.4 MMOL/L (ref 3.5–5.3)
PROT SERPL-MCNC: 7.2 G/DL (ref 6.4–8.4)
RBC # BLD AUTO: 4.96 MILLION/UL (ref 3.81–5.12)
SODIUM SERPL-SCNC: 139 MMOL/L (ref 135–147)
TRIGL SERPL-MCNC: 139 MG/DL (ref ?–150)
TSH SERPL DL<=0.05 MIU/L-ACNC: 2.1 UIU/ML (ref 0.45–4.5)
WBC # BLD AUTO: 8.69 THOUSAND/UL (ref 4.31–10.16)

## 2025-01-23 PROCEDURE — 80061 LIPID PANEL: CPT

## 2025-01-23 PROCEDURE — 36415 COLL VENOUS BLD VENIPUNCTURE: CPT

## 2025-01-23 PROCEDURE — 80053 COMPREHEN METABOLIC PANEL: CPT

## 2025-01-23 PROCEDURE — 85025 COMPLETE CBC W/AUTO DIFF WBC: CPT

## 2025-01-23 PROCEDURE — 84443 ASSAY THYROID STIM HORMONE: CPT

## 2025-01-29 ENCOUNTER — RESULTS FOLLOW-UP (OUTPATIENT)
Dept: FAMILY MEDICINE CLINIC | Facility: CLINIC | Age: 55
End: 2025-01-29

## 2025-01-30 DIAGNOSIS — I10 HYPERTENSION, ESSENTIAL: ICD-10-CM

## 2025-01-30 RX ORDER — HYDROCHLOROTHIAZIDE 25 MG/1
25 TABLET ORAL EVERY MORNING
Qty: 30 TABLET | Refills: 5 | Status: SHIPPED | OUTPATIENT
Start: 2025-01-30

## 2025-02-05 LAB — COLOGUARD RESULT REPORTABLE: NEGATIVE

## 2025-02-27 DIAGNOSIS — E03.9 HYPOTHYROIDISM, ADULT: ICD-10-CM

## 2025-02-27 RX ORDER — LEVOTHYROXINE SODIUM 150 UG/1
150 TABLET ORAL DAILY
Qty: 30 TABLET | Refills: 5 | Status: SHIPPED | OUTPATIENT
Start: 2025-02-27

## 2025-03-10 DIAGNOSIS — F41.1 GENERALIZED ANXIETY DISORDER: ICD-10-CM

## 2025-03-11 RX ORDER — BUSPIRONE HYDROCHLORIDE 5 MG/1
5 TABLET ORAL 2 TIMES DAILY
Qty: 60 TABLET | Refills: 5 | Status: SHIPPED | OUTPATIENT
Start: 2025-03-11

## 2025-04-02 ENCOUNTER — CLINICAL SUPPORT (OUTPATIENT)
Dept: FAMILY MEDICINE CLINIC | Facility: CLINIC | Age: 55
End: 2025-04-02
Payer: COMMERCIAL

## 2025-04-02 DIAGNOSIS — Z30.42 ENCOUNTER FOR DEPO-PROVERA CONTRACEPTION: Primary | ICD-10-CM

## 2025-04-02 PROCEDURE — 96372 THER/PROPH/DIAG INJ SC/IM: CPT

## 2025-04-02 RX ORDER — MEDROXYPROGESTERONE ACETATE 150 MG/ML
150 INJECTION, SUSPENSION INTRAMUSCULAR ONCE
Status: COMPLETED | OUTPATIENT
Start: 2025-04-02 | End: 2025-04-02

## 2025-04-02 RX ADMIN — MEDROXYPROGESTERONE ACETATE 150 MG: 150 INJECTION, SUSPENSION INTRAMUSCULAR at 08:26

## 2025-06-25 ENCOUNTER — CLINICAL SUPPORT (OUTPATIENT)
Dept: FAMILY MEDICINE CLINIC | Facility: CLINIC | Age: 55
End: 2025-06-25
Payer: COMMERCIAL

## 2025-06-25 DIAGNOSIS — Z30.42 ENCOUNTER FOR DEPO-PROVERA CONTRACEPTION: Primary | ICD-10-CM

## 2025-06-25 PROCEDURE — 96372 THER/PROPH/DIAG INJ SC/IM: CPT

## 2025-06-25 RX ORDER — MEDROXYPROGESTERONE ACETATE 150 MG/ML
150 INJECTION, SUSPENSION INTRAMUSCULAR ONCE
Status: COMPLETED | OUTPATIENT
Start: 2025-06-25 | End: 2025-06-25

## 2025-06-25 RX ADMIN — MEDROXYPROGESTERONE ACETATE 150 MG: 150 INJECTION, SUSPENSION INTRAMUSCULAR at 08:37

## 2025-06-25 NOTE — PROGRESS NOTES
Patient come into the clinic today for Depo injection. Patient wanted it in Right deltoid tolerated well. Will return between September 10-24 for next injection.

## 2025-07-09 ENCOUNTER — OFFICE VISIT (OUTPATIENT)
Dept: FAMILY MEDICINE CLINIC | Facility: CLINIC | Age: 55
End: 2025-07-09
Payer: COMMERCIAL

## 2025-07-09 VITALS
WEIGHT: 186 LBS | BODY MASS INDEX: 29.89 KG/M2 | HEART RATE: 90 BPM | SYSTOLIC BLOOD PRESSURE: 124 MMHG | HEIGHT: 66 IN | DIASTOLIC BLOOD PRESSURE: 80 MMHG | OXYGEN SATURATION: 99 %

## 2025-07-09 DIAGNOSIS — I10 BENIGN ESSENTIAL HYPERTENSION: Primary | ICD-10-CM

## 2025-07-09 DIAGNOSIS — E03.9 ACQUIRED HYPOTHYROIDISM: ICD-10-CM

## 2025-07-09 DIAGNOSIS — F32.0 MAJOR DEPRESSIVE DISORDER, SINGLE EPISODE, MILD WITH ANXIOUS DISTRESS (HCC): ICD-10-CM

## 2025-07-09 PROBLEM — Z76.89 ENCOUNTER FOR SUPPORT AND COORDINATION OF TRANSITION OF CARE: Status: RESOLVED | Noted: 2021-11-05 | Resolved: 2025-07-09

## 2025-07-09 PROCEDURE — 99213 OFFICE O/P EST LOW 20 MIN: CPT | Performed by: PHYSICIAN ASSISTANT

## 2025-07-09 NOTE — PROGRESS NOTES
Name: Natacha Serna      : 1970      MRN: [id number]  Encounter Provider: Valeria Ni PA-C  Encounter Date: 2025   Encounter department: [facility] [address] [facility]  :  Assessment & Plan  Benign essential hypertension  Controlled with HCTZ 25 mg  Continue current dose  Orders:  •  Comprehensive metabolic panel; Future    Acquired hypothyroidism  Controlled on current dose of levothyroxine 150 mcg  Continue taking current dose       Major depressive disorder, single episode, mild with anxious distress (HCC)  Controlled with Wellbutrin  mg twice a day  Pt               History of Present Illness   Hypertension  This is a chronic problem. The current episode started more than 1 year ago. The problem is controlled. Pertinent negatives include no chest pain, headaches, neck pain, palpitations or shortness of breath. There are no associated agents to hypertension. Risk factors for coronary artery disease include obesity and family history. The current treatment provides significant improvement. There are no compliance problems.  There is no history of angina, kidney disease, CAD/MI, CVA, heart failure, left ventricular hypertrophy, PVD or retinopathy.     Review of Systems   Constitutional:  Negative for appetite change, fatigue, fever and unexpected weight change.   HENT:  Negative for dental problem, ear pain, nosebleeds, rhinorrhea and trouble swallowing.    Eyes:  Negative for photophobia, pain, discharge and visual disturbance.   Respiratory:  Negative for cough, chest tightness, shortness of breath and wheezing.    Cardiovascular:  Negative for chest pain and palpitations.   Gastrointestinal:  Negative for abdominal pain, blood in stool, constipation, diarrhea, nausea, rectal pain and vomiting.   Endocrine: Negative for cold intolerance, polydipsia, polyphagia and polyuria.   Genitourinary:  Negative for decreased urine volume, difficulty urinating,  "dysuria and hematuria.   Musculoskeletal:  Negative for arthralgias, back pain, gait problem, joint swelling, myalgias, neck pain and neck stiffness.   Skin:  Negative for color change and rash.   Allergic/Immunologic: Negative for environmental allergies, food allergies and immunocompromised state.   Neurological:  Negative for dizziness, seizures, speech difficulty, light-headedness and headaches.   Hematological:  Negative for adenopathy. Does not bruise/bleed easily.   Psychiatric/Behavioral:  Negative for behavioral problems, confusion, decreased concentration, self-injury and sleep disturbance. The patient is not nervous/anxious and is not hyperactive.        Objective   /80   Pulse 90   Ht 5' 6\" (1.676 m)   Wt 84.4 kg (186 lb)   SpO2 99%   BMI 30.02 kg/m²      Physical Exam  Vitals and nursing note reviewed.   Constitutional:       Appearance: Normal appearance. She is normal weight.   HENT:      Head: Normocephalic and atraumatic.      Right Ear: External ear normal.      Left Ear: External ear normal.      Mouth/Throat:      Mouth: Mucous membranes are moist.      Pharynx: Oropharynx is clear.     Eyes:      Extraocular Movements: Extraocular movements intact.      Conjunctiva/sclera: Conjunctivae normal.     Neck:      Thyroid: No thyromegaly.      Vascular: No carotid bruit.     Cardiovascular:      Rate and Rhythm: Normal rate and regular rhythm.      Pulses: Normal pulses.      Heart sounds: Normal heart sounds.   Pulmonary:      Effort: Pulmonary effort is normal.      Breath sounds: Normal breath sounds.   Abdominal:      General: Abdomen is flat. Bowel sounds are normal.      Palpations: Abdomen is soft. There is no hepatomegaly, splenomegaly or mass.      Tenderness: There is no abdominal tenderness. There is no right CVA tenderness or left CVA tenderness.     Musculoskeletal:         General: Normal range of motion.      Cervical back: Normal range of motion and neck supple.      Right " lower leg: No edema.      Left lower leg: No edema.   Lymphadenopathy:      Cervical: No cervical adenopathy.     Skin:     General: Skin is warm and dry.     Neurological:      General: No focal deficit present.      Mental Status: She is alert and oriented to person, place, and time.     Psychiatric:         Mood and Affect: Mood normal.         Behavior: Behavior normal.         Thought Content: Thought content normal.         Judgment: Judgment normal.

## 2025-07-09 NOTE — ASSESSMENT & PLAN NOTE
Controlled with HCTZ 25 mg  Continue current dose  Orders:  •  Comprehensive metabolic panel; Future

## 2025-08-20 DIAGNOSIS — I10 HYPERTENSION, ESSENTIAL: ICD-10-CM

## 2025-08-21 RX ORDER — HYDROCHLOROTHIAZIDE 25 MG/1
25 TABLET ORAL EVERY MORNING
Qty: 30 TABLET | Refills: 5 | Status: SHIPPED | OUTPATIENT
Start: 2025-08-21